# Patient Record
Sex: MALE | Race: WHITE | NOT HISPANIC OR LATINO | Employment: OTHER | URBAN - METROPOLITAN AREA
[De-identification: names, ages, dates, MRNs, and addresses within clinical notes are randomized per-mention and may not be internally consistent; named-entity substitution may affect disease eponyms.]

---

## 2021-05-27 PROBLEM — Z86.0100 PERSONAL HISTORY OF COLONIC POLYPS: Status: ACTIVE | Noted: 2021-05-27

## 2024-05-17 ENCOUNTER — TRANSCRIBE ORDERS (OUTPATIENT)
Dept: ADMINISTRATIVE | Facility: HOSPITAL | Age: 64
End: 2024-05-17
Payer: MEDICARE

## 2024-05-17 DIAGNOSIS — J98.4 DISEASE OF LUNG: Primary | ICD-10-CM

## 2024-05-31 ENCOUNTER — HOSPITAL ENCOUNTER (OUTPATIENT)
Dept: PET IMAGING | Facility: HOSPITAL | Age: 64
Discharge: HOME | End: 2024-05-31
Payer: MEDICARE

## 2024-05-31 DIAGNOSIS — J98.4 DISEASE OF LUNG: ICD-10-CM

## 2024-05-31 LAB — GLUCOSE BLDC GLUCOMTR-MCNC: 111 MG/DL (ref 70–130)

## 2024-05-31 PROCEDURE — 0 FLUDEOXYGLUCOSE F18 SOLUTION: Performed by: GENERAL PRACTICE

## 2024-05-31 PROCEDURE — 78815 PET IMAGE W/CT SKULL-THIGH: CPT

## 2024-05-31 PROCEDURE — A9552 F18 FDG: HCPCS | Performed by: GENERAL PRACTICE

## 2024-05-31 PROCEDURE — 82948 REAGENT STRIP/BLOOD GLUCOSE: CPT

## 2024-05-31 RX ADMIN — FLUDEOXYGLUCOSE F 18 1 DOSE: 200 INJECTION, SOLUTION INTRAVENOUS at 12:49

## 2024-07-11 ENCOUNTER — OFFICE VISIT (OUTPATIENT)
Dept: SURGERY | Facility: CLINIC | Age: 64
End: 2024-07-11
Payer: MEDICARE

## 2024-07-11 VITALS
DIASTOLIC BLOOD PRESSURE: 76 MMHG | WEIGHT: 196.2 LBS | BODY MASS INDEX: 27.75 KG/M2 | SYSTOLIC BLOOD PRESSURE: 120 MMHG | HEART RATE: 50 BPM | OXYGEN SATURATION: 96 %

## 2024-07-11 DIAGNOSIS — K76.9 LIVER LESION: Primary | ICD-10-CM

## 2024-07-11 PROCEDURE — 3074F SYST BP LT 130 MM HG: CPT | Performed by: SURGERY

## 2024-07-11 PROCEDURE — 3078F DIAST BP <80 MM HG: CPT | Performed by: SURGERY

## 2024-07-11 PROCEDURE — 99205 OFFICE O/P NEW HI 60 MIN: CPT | Performed by: SURGERY

## 2024-07-11 NOTE — PROGRESS NOTES
"  THORACIC SURGERY CLINIC CONSULT NOTE    REASON FOR CONSULT: Liver lesion, lung nodule, concerning for metastatic disease, history of melanoma    REFERRING PROVIDER: Joshua Blake MD    Subjective   HISTORY OF PRESENTING ILLNESS:   Renzo Streeter is a 63 y.o. male who has significant medical problems as mentioned in the medical chart.     He had a chest x-ray which noted multiple lung nodules that prompted CT scan of the chest which was done on 5/12/2024 and reported 1.7 cm nodular opacity in the right lower lobe with additional scattered small bilateral pulm nodules.  Subsequent PET CT scan on 5/31/2024 reported multiple hypermetabolic hepatic and osseous lesions and multiple mildly hypermetabolic bilateral solid pulm nodules concerning for metastatic disease.  He has a history of melanoma.  He denied smoking cigarettes.  He is functionally active.    Past Medical History:   Diagnosis Date    Acute renal failure (ARF) 09/2018    RESOLVED AFTER DEHYDRATION RESOLVED    Anemia     Arthritis     OA AND RA    Asthma     Atrial fibrillation with rapid ventricular response     BPH (benign prostatic hyperplasia)     Chronic back pain     Coronary artery calcification seen on CAT scan     Eye cancer, left     TREATMENT WITH RADIATION. SOME VISON LOSS LEFT EYE    Gout     Hepatitis C     IN REMISSION. BEEN LONGER THAN 5 YRS    History of atrial fibrillation     SEPT 2018. WITH SEVERE DEHYDRATION    Hypertension     MRSA infection     HX OF    Pneumonia     Psoriasis     Right hip pain     Short of breath on exertion     Sleep apnea     \"MILD\". DID NOT FOLLOW THUR GETTING MACHINE RELATED TO COST OF MACHINE    Syncope 09/2018    secondary to atrial fibrillation with rapid ventricular response. SEVERELY DEHYDRATED AT THIS TIME.    Vision loss of left eye     EYE CANCER    Vitamin D deficiency        Past Surgical History:   Procedure Laterality Date    BACK SURGERY      LUMBAR DISC X2. NO HARDWARE    CARPAL TUNNEL RELEASE " WITH CUBITAL TUNNEL RELEASE Right     COLONOSCOPY      COLONOSCOPY N/A 7/30/2021    Procedure: COLONOSCOPY;  Surgeon: Flor Maciel MD;  Location: [facility] MAIN OR;  Service: Gastroenterology;  Laterality: N/A;  Diverticulosis    EYE SURGERY Left     Left Eye    REVISION TOTAL KNEE ARTHROPLASTY Right     TOTAL HIP ARTHROPLASTY Right 5/28/2019    Procedure: RIGHT TOTAL HIP ARTHROPLASTY;  Surgeon: Harish Dominguez MD;  Location: [facility] MAIN OR;  Service: Orthopedics    TOTAL KNEE ARTHROPLASTY Right     TOTAL KNEE ARTHROPLASTY Left 4/25/2024    Procedure: TOTAL KNEE ARTHROPLASTY;  Surgeon: Harish Dominguez MD;  [facility];  Service: Orthopedics;  Laterality: Left;       Family History   Problem Relation Age of Onset    Depression Mother     COPD Mother     Mental illness Mother     Diabetes Father     Hypertension Father     Heart disease Father     Hyperlipidemia Father     Cancer Father     Malig Hyperthermia Neg Hx     Drug abuse Neg Hx     Allergies Neg Hx     Asthma Neg Hx     Stroke Neg Hx        Social History     Socioeconomic History    Marital status: Single   Tobacco Use    Smoking status: Never    Smokeless tobacco: Never   Vaping Use    Vaping status: Never Used   Substance and Sexual Activity    Alcohol use: No     Comment: no caffeine     Drug use: No    Sexual activity: Defer         Current Outpatient Medications:     albuterol (PROVENTIL HFA;VENTOLIN HFA) 108 (90 BASE) MCG/ACT inhaler, Inhale 2 puffs every 4 (four) hours as needed for wheezing., Disp: 1 inhaler, Rfl: 3    allopurinol (ZYLOPRIM) 100 MG tablet, Take 1 tablet by mouth 2 (Two) Times a Day., Disp: , Rfl:     amLODIPine (NORVASC) 10 MG tablet, Take 1 tablet by mouth Daily., Disp: , Rfl: 6    aspirin 325 MG EC tablet, Take 1 tablet by mouth 2 (Two) Times a Day With Meals. Indications: VTE Prophylaxis, Disp: 60 tablet, Rfl: 0    cholecalciferol (VITAMIN D3) 25 MCG (1000 UT) tablet, Take 1 tablet by mouth Daily., Disp: , Rfl:      docusate sodium (COLACE) 250 MG capsule, Take 1 capsule by mouth 2 (Two) Times a Day As Needed for Constipation., Disp: 30 capsule, Rfl: 1    Enbrel SureClick 50 MG/ML solution auto-injector, Inject 50 mg under the skin into the appropriate area as directed 1 (One) Time Per Week., Disp: , Rfl:     folic acid (FOLVITE) 1 MG tablet, Take 1 tablet by mouth Daily., Disp: , Rfl:     gabapentin (NEURONTIN) 100 MG capsule, Take 1 capsule by mouth 3 (Three) Times a Day., Disp: , Rfl:     hydrochlorothiazide (HYDRODIURIL) 12.5 MG tablet, Take 1 tablet by mouth Daily., Disp: , Rfl: 1    lisinopril (PRINIVIL,ZESTRIL) 40 MG tablet, Take 1 tablet by mouth Daily., Disp: , Rfl: 6    methotrexate 2.5 MG tablet, Take 1 tablet by mouth 1 (One) Time Per Week. Pt states he takes 10 tablets weekly, Disp: , Rfl:     Omega-3 Fatty Acids (fish oil) 1000 MG capsule capsule, Take 1 capsule by mouth Daily With Breakfast. HOLD PER MD INSTR, Disp: , Rfl:     oxyCODONE-acetaminophen (PERCOCET) 5-325 MG per tablet, Take 1-2 tablets by mouth Every 4 (Four) Hours As Needed (pain)., Disp: 42 tablet, Rfl: 0    sildenafil (VIAGRA) 100 MG tablet, Take 1 tablet by mouth Daily As Needed., Disp: , Rfl:     SYMBICORT 160-4.5 MCG/ACT inhaler, Inhale 2 puffs 2 (Two) Times a Day., Disp: , Rfl: 12    tamsulosin (FLOMAX) 0.4 MG capsule 24 hr capsule, TAKE 1 CAPSULE EVERY DAY (Patient taking differently: 1 capsule 2 (Two) Times a Day.), Disp: 90 capsule, Rfl: 0     No Known Allergies          Objective    OBJECTIVE:     VITAL SIGNS:  /76 (BP Location: Left arm, Patient Position: Sitting, Cuff Size: Adult)   Pulse 50   Wt 89 kg (196 lb 3.2 oz)   SpO2 96%   BMI 27.75 kg/m²     PHYSICAL EXAM:  Normal appearance.   Head is normocephalic.   Nose appears normal.   No obvious deformity of the mouth and throat.  Conjunctivae normal.   Heart rate and rhythm is normal.  Pulmonary effort is normal.   Moving all 4 extremities.  Extremities warm.  No focal  deficit present.   Alert and oriented to person, place, and time.     LAB RESULTS:  I have reviewed all the available laboratory results in the chart.    RESULTS REVIEW:  I have reviewed the patient's all relevant laboratory and imaging findings.          ASSESSMENT & PLAN:  Renzo Streeter is a 63 y.o. male with significant medical conditions as mentioned above presented to my clinic.    Diagnosis: Liver lesion, lung nodule, concerning for metastatic disease, history of melanoma  Staging: Undetermined    The clinical picture is concerning for widely metastatic disease.  I doubt the primary is in the lung.  The intensely PET avid lesion is in the liver and is accessible for CT-guided biopsy.  I will refer him to radiology for CT-guided biopsy.  He will need to see medical oncology for systemic treatment.    I discussed the patients findings and my recommendations with the patient. The patient was given adequate time to ask questions and all questions were answered to patient satisfaction. Thank you for this consult and allowing us to participate in the care of your patient.      Patel Riggs MD  Thoracic Surgeon  [facility]        Dictated utilizing Dragon dictation    I spent 60 minutes caring for Renzo on this date of service. This time includes time spent by me in the following activities:preparing for the visit, reviewing tests, obtaining and/or reviewing a separately obtained history, performing a medically appropriate examination and/or evaluation , counseling and educating the patient/family/caregiver, ordering medications, tests, or procedures, referring and communicating with other health care professionals , documenting information in the medical record, independently interpreting results and communicating that information with the patient/family/caregiver, and care coordination and more than half the time was spent in direct face to face evaluation and decision making.

## 2024-07-11 NOTE — LETTER
July 22, 2024     Joshua Blake MD  [address]  [place] [address]    Patient: Renzo Streeter   YOB: 1960   Date of Visit: 7/11/2024       Dear Joshua Blake MD,    Renzo Streeter was in my office today. Below are the relevant portions of my assessment and plan of care.           If you have questions, please do not hesitate to call me. I look forward to following Renzo along with you.         Sincerely,        Patel Riggs MD        CC: No Recipients

## 2024-07-15 ENCOUNTER — TELEPHONE (OUTPATIENT)
Dept: SURGERY | Facility: CLINIC | Age: 64
End: 2024-07-15
Payer: MEDICARE

## 2024-07-15 ENCOUNTER — PREP FOR SURGERY (OUTPATIENT)
Dept: OTHER | Facility: HOSPITAL | Age: 64
End: 2024-07-15
Payer: MEDICARE

## 2024-07-15 DIAGNOSIS — K76.9 LIVER LESION: Primary | ICD-10-CM

## 2024-07-16 ENCOUNTER — PATIENT OUTREACH (OUTPATIENT)
Dept: OTHER | Facility: HOSPITAL | Age: 64
End: 2024-07-16
Payer: MEDICARE

## 2024-07-16 DIAGNOSIS — R91.1 LUNG NODULE: Primary | ICD-10-CM

## 2024-07-16 DIAGNOSIS — K76.9 HEPATIC LESION: ICD-10-CM

## 2024-07-16 NOTE — PROGRESS NOTES
07/19/24 0001   Pre-Procedure Phone Call   Procedure Time Verified Yes   Arrival Time 0930   Procedure Location Verified Yes   Medical History Reviewed No   NPO Status Reinforced Yes   Ride and Caregiver Arranged Yes   Patient Knows to Bring Current Medications No   Bring Outside Films Requested No

## 2024-07-17 ENCOUNTER — PATIENT OUTREACH (OUTPATIENT)
Dept: OTHER | Facility: HOSPITAL | Age: 64
End: 2024-07-17
Payer: MEDICARE

## 2024-07-17 NOTE — PROGRESS NOTES
Referral received from Dr. Riggs.  I called patient, introduced myself and explained navigational services.      The patient underwent CT of his chest on 5/12/24 which revealed a new 1.7 cm nodular opacity in the right lower lobe with additional scattered smaller bilateral pulmonary nodules. He underwent PET scan on 5/31/24 which revealed multiple hypermetabolic hepatic and osseous lesions and multiple mildly hypermetabolic bilateral solid pulmonary nodules concerning for metastatic disease. The patient was referred by his PCP to thoracic surgery on 7/3. He met with Dr. Riggs 7/11/24 to discuss his PET findings and next steps. Dr. Riggs ordered a CT guided liver biopsy, scheduled 7/19, to determine his primary cancer site and referred to medical oncology.    We discussed his recent appts with Dr. Riggs and upcoming liver biopsy. The patient is aware that he has a malignancy and the liver biopsy should reveal the primary disease site.  Explained I am working on coordinating a medical oncology appt.   The patient verbalized understanding.     The patient is alert and oriented. He has psoriatic arthropathy (on Enbrel) and a PMH of left TKA 4/25/24 and RHA 5/28/19.  He reports persistent back issues and met with neurosurgery yesterday for a pinched nerve in his back. The patient reports ongoing pain, managed with oral medications, and some weakness, although he ambulates without assistive devices, denies falls and is independent with ADLs. He lives alone in a single level home with a basement in [place], [place]. He denies difficulty navigating stairs.  The patient states he was disabled due to his back issues although is now retired.    The patient is a never smoker.    The patient has [employer] Medicare Replacement. He denies any current BHE claim issues. We discussed financial navigation, cost estimates and possible financial assistance if needed in the future.     The patient drives; he denies transportation, financial or other  resource needs at this time.     The patient states he is currently eating well. He reports his weight was 220 lbs 1 year ago, which decreased to 198 lbs (unintentionally). He currently weights 207 lbs.   We discussed a referral to oncology nutrition if needed in the future.     The patient has an ACP, although states he is in the process of updating it. Requested copy for the chart once finalized.    The patient has a history of uveal melanoma, which he states was treated with radiation 10-11 years ago in [place] (patient couldn't remember the name of provider). He continues annual surveillance for this (with local eye doctor-he can't remember the name). The patient reports approximately 40% vision loss in his left eye following treatment.  The patient states his mother had COPD although did not have lung cancer. He reports his father had cancer, although he doesn't know the exact type (he thinks it was bone).     The patient is processing through his probable cancer diagnosis.  Provided active listening and emotional support, validating and normalizing patient's feelings. He reports an adequate support system. The patient does not take any medications for anxiety or depression.  We discussed OSW and Behavioral oncology services.  Patient expressed gratitude for my support and denied any additional needs at this time. We also discussed [employer] and [facility] once his cancer type is confirmed.     We discussed integrative therapies and other services at the [facility]. Mailed a navigation folder with the following information today: [facility], Cancer and Restorative Exercise - CARE, [employer] exercise program, [facility], [facility], Reiki Therapy, [employer] [facility], Cancer Nutrition, Smoking Cessation and Survivorship Clinic.  I will provide additional disease specific cancer information once his primary cancer site is confirmed.    I  will continue to follow; provided my name and number and encouraged patient to call if any needs arise.      Call from friend, Jacque Carolina. Requested cb. She is on his MyChart. Called patient and rec'd verbal authorization to speak with Jacque Figueroa. We discussed his biopsy and plan to schedule with med onc to discuss treatment plan once primary cancer site is identified.  She will try to get the provider name/number who treated his uveal melanoma in the past.  She verbalized appreciation for talking with her.

## 2024-07-19 ENCOUNTER — HOSPITAL ENCOUNTER (OUTPATIENT)
Dept: CT IMAGING | Facility: HOSPITAL | Age: 64
Discharge: HOME | End: 2024-07-19
Payer: MEDICARE

## 2024-07-19 DIAGNOSIS — K76.9 LIVER LESION: ICD-10-CM

## 2024-07-19 LAB — PLATELET # BLD AUTO: 298 10*3/MM3 (ref 140–450)

## 2024-07-19 PROCEDURE — 85049 AUTOMATED PLATELET COUNT: CPT | Performed by: RADIOLOGY

## 2024-07-19 PROCEDURE — 88341 IMHCHEM/IMCYTCHM EA ADD ANTB: CPT | Performed by: SURGERY

## 2024-07-19 PROCEDURE — 88342 IMHCHEM/IMCYTCHM 1ST ANTB: CPT | Performed by: SURGERY

## 2024-07-19 PROCEDURE — 25010000002 LIDOCAINE 1 % SOLUTION: Performed by: RADIOLOGY

## 2024-07-19 PROCEDURE — 88307 TISSUE EXAM BY PATHOLOGIST: CPT | Performed by: SURGERY

## 2024-07-19 PROCEDURE — 77012 CT SCAN FOR NEEDLE BIOPSY: CPT

## 2024-07-19 RX ORDER — FLUMAZENIL 0.1 MG/ML
INJECTION INTRAVENOUS
Status: DISCONTINUED
Start: 2024-07-19 | End: 2024-07-19 | Stop reason: WASHOUT

## 2024-07-19 RX ORDER — MIDAZOLAM HYDROCHLORIDE 1 MG/ML
INJECTION INTRAMUSCULAR; INTRAVENOUS
Status: ACTIVE
Start: 2024-07-19 | End: 2024-07-19

## 2024-07-19 RX ORDER — SODIUM CHLORIDE 0.9 % (FLUSH) 0.9 %
10 SYRINGE (ML) INJECTION AS NEEDED
Status: DISCONTINUED | OUTPATIENT
Start: 2024-07-19 | End: 2024-07-20 | Stop reason: HOSPADM

## 2024-07-19 RX ORDER — NALOXONE HYDROCHLORIDE 0.4 MG/ML
INJECTION, SOLUTION INTRAMUSCULAR; INTRAVENOUS; SUBCUTANEOUS
Status: DISCONTINUED
Start: 2024-07-19 | End: 2024-07-19 | Stop reason: WASHOUT

## 2024-07-19 RX ORDER — FENTANYL CITRATE 50 UG/ML
INJECTION, SOLUTION INTRAMUSCULAR; INTRAVENOUS
Status: ACTIVE
Start: 2024-07-19 | End: 2024-07-19

## 2024-07-19 RX ORDER — MIDAZOLAM HYDROCHLORIDE 1 MG/ML
0.5 INJECTION INTRAMUSCULAR; INTRAVENOUS ONCE AS NEEDED
Status: DISCONTINUED | OUTPATIENT
Start: 2024-07-19 | End: 2024-07-20 | Stop reason: HOSPADM

## 2024-07-19 RX ORDER — LIDOCAINE HYDROCHLORIDE 10 MG/ML
20 INJECTION, SOLUTION INFILTRATION; PERINEURAL ONCE
Status: COMPLETED | OUTPATIENT
Start: 2024-07-19 | End: 2024-07-19

## 2024-07-19 RX ORDER — SODIUM CHLORIDE 9 MG/ML
25 INJECTION, SOLUTION INTRAVENOUS ONCE
Status: DISCONTINUED | OUTPATIENT
Start: 2024-07-19 | End: 2024-07-20 | Stop reason: HOSPADM

## 2024-07-19 RX ADMIN — LIDOCAINE HYDROCHLORIDE 20 ML: 10 INJECTION, SOLUTION INFILTRATION; PERINEURAL at 11:44

## 2024-07-25 ENCOUNTER — LAB (OUTPATIENT)
Dept: LAB | Facility: HOSPITAL | Age: 64
End: 2024-07-25
Payer: MEDICARE

## 2024-07-25 ENCOUNTER — CONSULT (OUTPATIENT)
Dept: ONCOLOGY | Facility: CLINIC | Age: 64
End: 2024-07-25
Payer: MEDICARE

## 2024-07-25 VITALS
BODY MASS INDEX: 29.63 KG/M2 | RESPIRATION RATE: 16 BRPM | SYSTOLIC BLOOD PRESSURE: 117 MMHG | HEIGHT: 70 IN | OXYGEN SATURATION: 95 % | WEIGHT: 207 LBS | DIASTOLIC BLOOD PRESSURE: 77 MMHG | TEMPERATURE: 98.1 F | HEART RATE: 57 BPM

## 2024-07-25 DIAGNOSIS — C43.9 METASTATIC MELANOMA: Primary | ICD-10-CM

## 2024-07-25 DIAGNOSIS — C69.92 MALIGNANT NEOPLASM OF LEFT EYE: Primary | ICD-10-CM

## 2024-07-25 DIAGNOSIS — C78.7 CANCER, METASTATIC TO LIVER: ICD-10-CM

## 2024-07-25 DIAGNOSIS — C34.90 MALIGNANT NEOPLASM OF LUNG, UNSPECIFIED LATERALITY, UNSPECIFIED PART OF LUNG: Primary | ICD-10-CM

## 2024-07-25 DIAGNOSIS — C43.9 METASTATIC MALIGNANT MELANOMA: Primary | ICD-10-CM

## 2024-07-25 DIAGNOSIS — C79.51 SECONDARY CANCER OF BONE: ICD-10-CM

## 2024-07-25 DIAGNOSIS — C78.02 MALIGNANT NEOPLASM METASTATIC TO BOTH LUNGS: ICD-10-CM

## 2024-07-25 DIAGNOSIS — C69.32 MALIGNANT NEOPLASM OF LEFT CHOROID: ICD-10-CM

## 2024-07-25 DIAGNOSIS — C69.92 MALIGNANT NEOPLASM OF LEFT EYE: ICD-10-CM

## 2024-07-25 DIAGNOSIS — C78.01 MALIGNANT NEOPLASM METASTATIC TO BOTH LUNGS: ICD-10-CM

## 2024-07-25 LAB
BASOPHILS # BLD AUTO: 0.1 10*3/MM3 (ref 0–0.2)
BASOPHILS NFR BLD AUTO: 1.1 % (ref 0–1.5)
DEPRECATED RDW RBC AUTO: 53.4 FL (ref 37–54)
EOSINOPHIL # BLD AUTO: 0.54 10*3/MM3 (ref 0–0.4)
EOSINOPHIL NFR BLD AUTO: 5.8 % (ref 0.3–6.2)
ERYTHROCYTE [DISTWIDTH] IN BLOOD BY AUTOMATED COUNT: 15.8 % (ref 12.3–15.4)
HCT VFR BLD AUTO: 42.8 % (ref 37.5–51)
HGB BLD-MCNC: 13.7 G/DL (ref 13–17.7)
IMM GRANULOCYTES # BLD AUTO: 0.06 10*3/MM3 (ref 0–0.05)
IMM GRANULOCYTES NFR BLD AUTO: 0.6 % (ref 0–0.5)
LYMPHOCYTES # BLD AUTO: 3.17 10*3/MM3 (ref 0.7–3.1)
LYMPHOCYTES NFR BLD AUTO: 33.9 % (ref 19.6–45.3)
MCH RBC QN AUTO: 29.6 PG (ref 26.6–33)
MCHC RBC AUTO-ENTMCNC: 32 G/DL (ref 31.5–35.7)
MCV RBC AUTO: 92.4 FL (ref 79–97)
MONOCYTES # BLD AUTO: 1 10*3/MM3 (ref 0.1–0.9)
MONOCYTES NFR BLD AUTO: 10.7 % (ref 5–12)
NEUTROPHILS NFR BLD AUTO: 4.49 10*3/MM3 (ref 1.7–7)
NEUTROPHILS NFR BLD AUTO: 47.9 % (ref 42.7–76)
NRBC BLD AUTO-RTO: 0 /100 WBC (ref 0–0.2)
PLATELET # BLD AUTO: 282 10*3/MM3 (ref 140–450)
PMV BLD AUTO: 9.4 FL (ref 6–12)
RBC # BLD AUTO: 4.63 10*6/MM3 (ref 4.14–5.8)
WBC NRBC COR # BLD AUTO: 9.36 10*3/MM3 (ref 3.4–10.8)

## 2024-07-25 PROCEDURE — 85025 COMPLETE CBC W/AUTO DIFF WBC: CPT

## 2024-07-25 PROCEDURE — 3078F DIAST BP <80 MM HG: CPT | Performed by: INTERNAL MEDICINE

## 2024-07-25 PROCEDURE — 3074F SYST BP LT 130 MM HG: CPT | Performed by: INTERNAL MEDICINE

## 2024-07-25 PROCEDURE — 1125F AMNT PAIN NOTED PAIN PRSNT: CPT | Performed by: INTERNAL MEDICINE

## 2024-07-25 PROCEDURE — 36415 COLL VENOUS BLD VENIPUNCTURE: CPT

## 2024-07-25 PROCEDURE — 99205 OFFICE O/P NEW HI 60 MIN: CPT | Performed by: INTERNAL MEDICINE

## 2024-07-25 NOTE — PROGRESS NOTES
.     REASON FOR CONSULTATION:     Provide an opinion on any further workup or treatment of metastatic melanoma.                             REQUESTING PHYSICIAN: Patel Riggs MD    RECORDS OBTAINED:  Records of the patient's history including those obtained from the referring provider were reviewed and summarized in detail.    HISTORY OF PRESENT ILLNESS:  The patient is a 63 y.o. year old male  who is here for initial evaluation with the above-mentioned history.  History of Present Illness  The patient presents today on 7/25/2024 for evaluation of newly diagnosed metastatic melanoma.  This patient has end-stage arthritis required bilateral knee replacement, history of left eye melanoma, lumbar stenosis status post discectomy, hepatitis C, hypertension, and psoriasis.    His primary doctor found lung issues first.  Patient reports he was congested with a cough and dyspnea for about a month.  Patient went to see his primary care physician Dr. Joshua Blake on 4/17/2024 who prescribed doxycycline and had a chest ray examination which reported multiple pulmonary nodules.  He subsequently had a chest CT on 5/152024 which reported a 1.7 cm nodule opacity in the right lower lobe with additional scattered small bilateral pulmonary nodules.  It also reported slightly increased size of mildly asymmetric prominent left axillary lymph nodes.  There is also mild asymmetric elevation of the left hemidiaphragm with a new segmental atelectasis and scarring at the left lung base.    Patient has subsequently had a PET scan examination at the [facility], and the reported the right lower lobe 1.6 cm solid nodule with a maximum SUV 3.6.  Multiple additional bilateral subcentimeter solid nodules too small for accurate PET characterization but mainly had uptake greater than background 11.  There was no enlarged hypermetabolic mediastinal or hilar lymph nodes.  In the abdomen, there were 2 dominant hypermetabolic  hypodense right hepatic lesions with reference 2.7 cm with SUV 11.1.  There is at least 3 additional smaller hypermetabolic pedicle foci with the left hepatic lobe SUV 3.7.  The lesion in the caudate lobe had a maximum SUV 5.5.  There are multiple pulm lesions, the T6 lesion had SUV 10.8, and the right ilium lesion maximum SUV 10.7, and the right sacrum lesion SUV 9.7, and the left supra-acetabular ilium lesion with SUV 4.6.    The patient was seen by thoracic surgeon Dr. Riggs on 2024, Dr. Riggs recommended CT-guided core needle biopsy of the liver lesion.  This was done on 2024.  Pathology evaluation reported metastatic melanoma.     Patient reports today that he was diagnosed with melanoma in his left eye approximately 12 years ago during a routine eye exam. At that time, his eyesight was unimpaired, but a patch was placed in his eye. He spent about a week in a room following the procedure. He lost approximately 40 percent of his vision due to blockage. He received radiation therapy in [facility].     He experienced a minor headache a few weeks ago, which is unusual for him. He denies experiencing nausea, vomiting, or abdominal pain. His appetite is normal. He experienced weight loss of 10 pounds about 1 to 2 months ago, but has since regained it and is attempting to lose weight again.    He is scheduled for an MRI of the spine on 2024. His back pain, which he rates as 6 out of 10 at its worst, is located in the lower back at the sacrum area. He also reports weakness in his legs and is not currently taking any medication for the pain. He has consulted with a neurosurgeon and has informed them of his cancer diagnosis.      SOCIAL HISTORY  He is a  but has never smoked. He used to drink 2 drinks a day, but he has not drunk in over 20 to 25 years.    FAMILY HISTORY  His father had liver, bone, or blood cancer. He  of the cancer.  No details available.         Results  Laboratory Studies  "on 7/25/2024  Hemoglobin 13.7 MCV 92.4, platelets 282,000 WBC 9360 neutrophils 4490 lymphocytes 3170, monocytes 1000 eosinophil 540 basophil 100 and immature granulocytes 60.      Biopsy from the liver confirmed metastatic melanoma.        Past Medical History:   Diagnosis Date    Acute renal failure (ARF) 09/2018    RESOLVED AFTER DEHYDRATION RESOLVED    Anemia     Arthritis     OA AND RA    Asthma     Atrial fibrillation with rapid ventricular response     Bone cancer     BPH (benign prostatic hyperplasia)     Chronic back pain     CKD (chronic kidney disease)     Stage 3    COPD (chronic obstructive pulmonary disease)     Coronary artery calcification seen on CAT scan     Eye cancer, left     TREATMENT WITH RADIATION. SOME VISON LOSS LEFT EYE    Gout     Hepatitis C     IN REMISSION. BEEN LONGER THAN 5 YRS    Hiatal hernia     History of atrial fibrillation     SEPT 2018. WITH SEVERE DEHYDRATION    History of blood transfusion 1978    in [place], no known reaction mild shortness of breath    Hypertension     Liver cancer     Lung cancer     MRSA infection     HX OF    Pneumonia     Psoriasis     Right hip pain     Short of breath on exertion     Sleep apnea     \"MILD\". DID NOT FOLLOW THUR GETTING MACHINE RELATED TO COST OF MACHINE    Syncope 09/2018    secondary to atrial fibrillation with rapid ventricular response. SEVERELY DEHYDRATED AT THIS TIME.    Viral hepatitis     Vision loss of left eye     EYE CANCER    Vitamin D deficiency      Past Surgical History:   Procedure Laterality Date    BACK SURGERY      LUMBAR DISC X2. NO HARDWARE    CARPAL TUNNEL RELEASE WITH CUBITAL TUNNEL RELEASE Right     COLONOSCOPY      COLONOSCOPY N/A 07/30/2021    Procedure: COLONOSCOPY;  Surgeon: Flor Maciel MD;  Location: [facility] MAIN OR;  Service: Gastroenterology;  Laterality: N/A;  Diverticulosis    EYE SURGERY Left     Left Eye    LUMBAR SPINE SURGERY      x3 in 2000. 2010, 2011    REVISION TOTAL KNEE ARTHROPLASTY " Right     TOTAL HIP ARTHROPLASTY Right 05/28/2019    Procedure: RIGHT TOTAL HIP ARTHROPLASTY;  Surgeon: Harish Dominguez MD;  Location: [facility] MAIN OR;  Service: Orthopedics    TOTAL KNEE ARTHROPLASTY Right     TOTAL KNEE ARTHROPLASTY Left 04/25/2024    Procedure: TOTAL KNEE ARTHROPLASTY;  Surgeon: Harish Dominguez MD;  Location: [facility] OR [facility];  Service: Orthopedics;  Laterality: Left;       MEDICATIONS    Current Outpatient Medications:     albuterol (PROVENTIL HFA;VENTOLIN HFA) 108 (90 BASE) MCG/ACT inhaler, Inhale 2 puffs every 4 (four) hours as needed for wheezing., Disp: 1 inhaler, Rfl: 3    allopurinol (ZYLOPRIM) 100 MG tablet, Take 1 tablet by mouth 2 (Two) Times a Day., Disp: , Rfl:     amLODIPine (NORVASC) 10 MG tablet, Take 1 tablet by mouth Daily., Disp: , Rfl: 6    cholecalciferol (VITAMIN D3) 25 MCG (1000 UT) tablet, Take 1 tablet by mouth Daily., Disp: , Rfl:     docusate sodium (COLACE) 250 MG capsule, Take 1 capsule by mouth 2 (Two) Times a Day As Needed for Constipation., Disp: 30 capsule, Rfl: 1    Enbrel SureClick 50 MG/ML solution auto-injector, Inject 50 mg under the skin into the appropriate area as directed 1 (One) Time Per Week., Disp: , Rfl:     folic acid (FOLVITE) 1 MG tablet, Take 1 tablet by mouth Daily., Disp: , Rfl:     gabapentin (NEURONTIN) 100 MG capsule, Take 1 capsule by mouth 3 (Three) Times a Day., Disp: , Rfl:     hydrochlorothiazide (HYDRODIURIL) 12.5 MG tablet, Take 1 tablet by mouth Daily., Disp: , Rfl: 1    lisinopril (PRINIVIL,ZESTRIL) 40 MG tablet, Take 1 tablet by mouth Daily., Disp: , Rfl: 6    Omega-3 Fatty Acids (fish oil) 1000 MG capsule capsule, Take 1 capsule by mouth Daily With Breakfast. HOLD PER MD INSTR, Disp: , Rfl:     oxyCODONE-acetaminophen (PERCOCET) 5-325 MG per tablet, Take 1-2 tablets by mouth Every 4 (Four) Hours As Needed (pain)., Disp: 42 tablet, Rfl: 0    sildenafil (VIAGRA) 100 MG tablet, Take 1 tablet by mouth Daily As Needed., Disp: , Rfl:      SYMBICORT 160-4.5 MCG/ACT inhaler, Inhale 2 puffs 2 (Two) Times a Day., Disp: , Rfl: 12    tamsulosin (FLOMAX) 0.4 MG capsule 24 hr capsule, TAKE 1 CAPSULE EVERY DAY (Patient taking differently: 1 capsule 2 (Two) Times a Day.), Disp: 90 capsule, Rfl: 0    aspirin 325 MG EC tablet, Take 1 tablet by mouth 2 (Two) Times a Day With Meals. Indications: VTE Prophylaxis (Patient not taking: Reported on 7/25/2024), Disp: 60 tablet, Rfl: 0    methotrexate 2.5 MG tablet, Take 1 tablet by mouth 1 (One) Time Per Week. Pt states he takes 10 tablets weekly (Patient not taking: Reported on 7/25/2024), Disp: , Rfl:     ALLERGIES:   No Known Allergies    SOCIAL HISTORY:       Social History     Socioeconomic History    Marital status: Single   Tobacco Use    Smoking status: Never     Passive exposure: Never    Smokeless tobacco: Never   Vaping Use    Vaping status: Never Used   Substance and Sexual Activity    Alcohol use: No     Comment: no caffeine     Drug use: No    Sexual activity: Defer         FAMILY HISTORY:  Family History   Problem Relation Age of Onset    Depression Mother     COPD Mother     Mental illness Mother     Diabetes Father     Hypertension Father     Heart disease Father     Hyperlipidemia Father     Cancer Father     Malig Hyperthermia Neg Hx     Drug abuse Neg Hx     Allergies Neg Hx     Asthma Neg Hx     Stroke Neg Hx        REVIEW OF SYSTEMS:  Review of Systems   Constitutional:  Positive for fatigue and unexpected weight change. Negative for appetite change, diaphoresis and fever.   HENT:  Negative for mouth sores, sinus pressure and trouble swallowing.    Eyes:  Positive for visual disturbance.   Respiratory:  Negative for cough and shortness of breath.    Cardiovascular:  Negative for chest pain and leg swelling.   Gastrointestinal:  Negative for abdominal distention, abdominal pain, anal bleeding, blood in stool, nausea and vomiting.   Genitourinary:  Negative for dysuria and hematuria.    Musculoskeletal:  Positive for back pain. Negative for joint swelling and neck pain.   Skin:  Negative for pallor.   Allergic/Immunologic: Positive for immunocompromised state.   Neurological:  Negative for seizures and headaches.   Hematological:  Negative for adenopathy.   Psychiatric/Behavioral:  Negative for confusion.               There were no vitals filed for this visit.      7/25/2024     8:55 AM   Current Status   ECOG score 0      PHYSICAL EXAM:      Physical Exam    CONSTITUTIONAL:  Vital signs reviewed.  Well-developed well-nourished male.  No distress, looks comfortable.  EYES:  Conjunctivae and lids unremarkable.    EARS,NOSE,MOUTH,THROAT:  Ears and nose appear unremarkable.  Lips appear unremarkable.  RESPIRATORY:  Normal respiratory effort.  Lungs clear to auscultation bilaterally.  CARDIOVASCULAR:  Normal S1, S2.  No significant lower extremity edema.  GASTROINTESTINAL: Abdomen appears unremarkable.  Nontender.  No hepatomegaly.  No splenomegaly.  LYMPHATIC:  No cervical, supraclavicular, axillary lymphadenopathy.  MUSCULOSKELETAL:  Unremarkable gait and station.  Unremarkable digits/nails.  No cyanosis or clubbing.  SKIN:  Warm.  No rashes.      RECENT LABS:        WBC   Date Value Ref Range Status   04/26/2024 21.45 (H) 3.40 - 10.80 10*3/mm3 Final   03/26/2024 9.41 3.40 - 10.80 10*3/mm3 Final   10/19/2021 8.82 4.5 - 11.0 10*3/uL Final   01/06/2021 9.81 3.40 - 10.80 10*3/mm3 Final   05/29/2019 18.94 (H) 3.40 - 10.80 10*3/mm3 Final   05/22/2019 9.67 3.40 - 10.80 10*3/mm3 Final   03/18/2019 11.1 4.5 - 11.5 10*3/uL Final   12/19/2018 9.0 4.5 - 11.0 10*3/uL Final   09/01/2018 10.51 4.50 - 10.70 10*3/mm3 Final   08/31/2018 9.91 4.50 - 10.70 10*3/mm3 Final   08/30/2018 10.48 4.50 - 10.70 10*3/mm3 Final   08/30/2018 10.22 4.50 - 10.70 10*3/mm3 Final   08/16/2018 TNP Thousand/uL K/uL Final   05/02/2018 7.8 THOUSAND/UL 3.8 - 10.8 K/uL Final   01/31/2018 9.1 THOUSAND/UL 3.8 - 10.8 K/uL Final   07/13/2017  9.4 THOUSAND/UL 3.8 - 10.8 K/uL Final   04/09/2017 11.8 THOUSAND/UL (H) 3.8 - 10.8 K/uL Final   01/05/2017 9.8 THOUSAND/UL 3.8 - 10.8 K/uL Final   02/16/2015 10.96 (H) 4.50 - 10.70 K/Cumm Final   08/30/2014 11.69 (H) 4.50 - 10.70 K/Cumm Final   08/29/2014 15.59 (H) 4.50 - 10.70 K/Cumm Final     Hemoglobin   Date Value Ref Range Status   04/26/2024 10.9 (L) 13.0 - 17.7 g/dL Final   03/26/2024 13.7 13.0 - 17.7 g/dL Final   10/19/2021 13.8 13.5 - 17.5 g/dL Final   01/06/2021 15.4 13.0 - 17.7 g/dL Final   05/29/2019 12.7 (L) 13.0 - 17.7 g/dL Final   05/22/2019 15.0 13.0 - 17.7 g/dL Final   03/18/2019 14.4 14.0 - 18.0 g/dL Final   12/19/2018 15.7 13.5 - 17.5 g/dL Final   09/01/2018 13.0 (L) 13.7 - 17.6 g/dL Final   08/31/2018 13.0 (L) 13.7 - 17.6 g/dL Final   08/30/2018 13.3 (L) 13.7 - 17.6 g/dL Final   08/30/2018 12.8 (L) 13.7 - 17.6 g/dL Final   05/02/2018 15.0 13.2 - 17.1 g/dL Final   01/31/2018 16.7 13.2 - 17.1 g/dL Final   07/13/2017 17.4 (H) 13.2 - 17.1 g/dL Final   04/09/2017 15.3 13.2 - 17.1 g/dL Final   01/05/2017 15.7 13.2 - 17.1 g/dL Final   02/28/2015 13.3 (L) 13.7 - 17.6 g/dL Final   02/16/2015 15.3 13.7 - 17.6 g/dL Final   08/30/2014 14.2 13.7 - 17.6 g/dL Final   08/29/2014 16.1 13.7 - 17.6 g/dL Final     Platelets   Date Value Ref Range Status   07/19/2024 298 140 - 450 10*3/mm3 Final   04/26/2024 196 140 - 450 10*3/mm3 Final   03/26/2024 287 140 - 450 10*3/mm3 Final   10/19/2021 281 140 - 440 10*3/uL Final   01/06/2021 290 140 - 450 10*3/mm3 Final   05/29/2019 238 140 - 450 10*3/mm3 Final   05/22/2019 298 140 - 450 10*3/mm3 Final   03/18/2019 282 150 - 450 10*3/uL Final   12/19/2018 219 140 - 440 10*3/uL Final   09/01/2018 186 140 - 500 10*3/mm3 Final   08/31/2018 179 140 - 500 10*3/mm3 Final   08/30/2018 201 140 - 500 10*3/mm3 Final   08/30/2018 193 140 - 500 10*3/mm3 Final   05/02/2018 235 THOUSAND/ - 400 10*3/mm3 Final   01/31/2018 253 THOUSAND/ - 400 10*3/mm3 Final   07/13/2017 243  "THOUSAND/ - 400 10*3/mm3 Final   04/09/2017 237 THOUSAND/ - 400 10*3/mm3 Final   01/05/2017 231 THOUSAND/ - 400 10*3/mm3 Final   02/16/2015 254 140 - 500 K/Cumm Final   08/30/2014 192 140 - 500 K/Cumm Final   08/29/2014 229 140 - 500 K/Cumm Final       PATHOLOGY:  Tissue Pathology Exam: [id number]  Order: [id number]  Status: Final result       Visible to patient: No (scheduled for 7/25/2024  9:53 AM)       Next appt: None    Specimen Information: Liver; Tissue   0 Result Notes      Component    Case Report   Surgical Pathology Report                         Case: [id number]                                   Authorizing Provider:  Patel Riggs MD            Collected:           07/19/2024 11:50 AM           Ordering Location:     [facility]  Received:            07/19/2024 04:54 PM                                  CT                                                                           Pathologist:           Antonieta Hickey MD                                                     Specimen:    Liver, liver                                                                              Final Diagnosis   1.  Liver, CT-guided core biopsies for lesion: METASTATIC MELANOMA.     SWM   Electronically signed by Antonieta Hickey MD on 7/22/2024 at 0953   Gross Description    1. Liver.   Received in formalin, labeled with the patient's name, and designated \" liver biopsy\", are 3 orange to gray-white core fragments of soft tissue measuring from 0.4 cm up to 1.7 x 0.1 cm in tubal diameter.  The tissue is submitted in 1A.        mb/uso/swm         Special Stains    Utilizing appropriate controls, multiple immunohistochemical stains are performed including S100, SOX10, AE1/AE3, Mart 1, CK5/6, TTF-1, p40 and CK7.  The tumor cells are positive for S100, SOX10 and Mart 1 and negative for the remaining markers, supporting the rendered diagnosis.              IMAGING:  F-18 FDG PET SKULL BASE " TO MID THIGH WITH PET CT FUSION.     HISTORY: Ocular melanoma of the right eye. Pulmonary nodules. Subsequent  treatment strategy.     TECHNIQUE: Radiation dose reduction techniques were utilized, including  automated exposure control and exposure modulation based on body size.   Blood glucose level at time of injection was 111 mg/dL. 5.9 mCi of F-18  FDG were injected and PET was performed from skull base to mid thigh. CT  was obtained for localization and attenuation correction. Normalization  method: patient weight. Time at injection 1249. PET start time 225.     Comparison: CT chest 5/12/2024. FDG PET/CT of 5/1/2015. CT abdomen  pelvis 4/17/2015..     FINDINGS:     Head/neck: No suspicious uptake.     Chest: Right lower lobe 1.6 cm solid nodule with max SUV of 3.6 (series  4/image 162). Multiple additional bilateral subcentimeter solid nodules  are too small for accurate PET characterization but many have uptake  greater than background lung. Reference 0.7 cm left lower lobe nodule  with misregistered max SUV of 1.3 (series 4/image 171).     No enlarged or hypermetabolic mediastinal/hilar lymph nodes        Abdomen/pelvis: Two dominant hypermetabolic slightly hypodense right  hepatic lesions. Reference 2.7 cm right hepatic lesion with max SUV of  11.1 (series 4/image 229). At least 3 additional small hypermetabolic  hepatic foci. Reference focus in the left hepatic lobe with max SUV of  3.7 (series 4/image 221). Additional reference focus in the caudate lobe  with max SUV of 5.5 (series 4/image 243).     Likely urinary activity within the distal left ureter.     Bones: Few hypermetabolic faintly sclerotic foci.     Reference osseous lesions:  *  T6 with max SUV of 10.8 (series 4/image 160)  *  Right ilium with max SUV of 10.7 (series 4/image 322)  *  Right sacrum with max SUV of 9.7 (series 4/image 347)  *  Left supra-acetabular ilium with max SUV of 4.6 (series 4/image 362)           IMPRESSION:  Multiple  hypermetabolic hepatic and osseous lesions and  multiple mildly hypermetabolic bilateral solid pulmonary nodules are  concerning for metastatic disease. Recommend tissue sampling.       Assessment & Plan   Assessment & Plan  1. Metastatic melanoma in the liver, bilateral lungs and bones.  Patient has a history of left eye melanoma 12 years ago.  Patient had a cough and x-ray subsequently chest CT scan 7/15/2024 showed left lower lobe pulmonary nodule 1.7 cm, and multiple other smaller pulmonary nodules.  PET scan on 7/31/2024 reported multiple hypermetabolic lesions. The patient also has multiple metastatic hypermetabolic bone lesions including the T6 vertebral body and also the bilateral iliac wings and also the left sacrum close to the SI joint.   Patient had a liver biopsy confirmed to be metastatic melanoma.  I discussed with the patient the on 7/25/2024 that I recommended to test his tumor sample by [facility] NGS study to see if the patient will have BRAF mutation so he would be a candidate for oral TKI treatment. Then, we also would look into whether this patient would be a candidate for other targeted therapy as well as immunotherapy.   Arrangements will be made for brain MRI with and without contrast.      2.  Metastatic cancer to the bone.   PET scan examination showed multiple bone lesions.    I recommended to start the patient on IV Zometa every 4 weeks.     3.  Back pain.  PET scan examination showed multiple bone lesions including T6 vertebral body, and the sacrum and the pelvic bone..    The patient reports low back pain and I looked at the PET scan images. I do not think necessarily the small left SI joint area bone lesion causing the problem. He reports that he was seen by neurosurgeon and is scheduled to have a spine MRI examination on 08/09/2024.  I certainly would like to see the results of that to make final assessment, to see whether this patient would benefit from some radiation therapy or he has  lower back pain from other course. He previously had multiple lumbar spine surgeries for pinched nerve/discectomy.        PLAN:  The liver biopsy sample to be tested by [facility] NGS study to see if the patient will have BRAF mutation so he would be a candidate for oral TKI treatment. Then, we also would look into whether this patient would be a candidate for other targeted therapy as well as immunotherapy.   This test takes 10 working days, so there will be at least 2 weeks from current time.   Arrangements will be made for brain MRI with and without contrast for further evaluation.  Patient reports having intermittent headaches.  Arrangements will be made for intravenous Zometa treatment. I will obtain insurance permission.   I will see the patient in about 3 weeks, expecting the molecular pathology study results will be back, so we could discuss further about management of plan. The patient reports voiced understanding and agreeable.     I independently reviewed his PET scan images and shared images with the patient today.    I spent 70 minutes caring for Renzo on this date of service. This time includes time spent by me in the following activities: preparing for the visit, reviewing tests, obtaining and/or reviewing a separately obtained history, performing a medically appropriate examination and/or evaluation, counseling and educating the patient/family/caregiver, ordering medications, tests, or procedures, referring and communicating with other health care professionals, documenting information in the medical record, independently interpreting results and communicating that information with the patient/family/caregiver and care coordination     DARYL BERMUDEZ M.D., Ph.D.        CC:    MD Hayden Ruano Stephen C., MD Sarah Seifert, PA-C

## 2024-07-26 ENCOUNTER — TELEPHONE (OUTPATIENT)
Dept: ONCOLOGY | Facility: CLINIC | Age: 64
End: 2024-07-26
Payer: MEDICARE

## 2024-07-26 ENCOUNTER — PATIENT ROUNDING (BHMG ONLY) (OUTPATIENT)
Dept: ONCOLOGY | Facility: CLINIC | Age: 64
End: 2024-07-26
Payer: MEDICARE

## 2024-07-26 DIAGNOSIS — R45.89 ANXIETY ABOUT HEALTH: Primary | ICD-10-CM

## 2024-07-26 LAB
LAB AP CASE REPORT: NORMAL
LAB AP SPECIAL STAINS: NORMAL
PATH REPORT.ADDENDUM SPEC: NORMAL
PATH REPORT.FINAL DX SPEC: NORMAL
PATH REPORT.GROSS SPEC: NORMAL

## 2024-07-26 RX ORDER — DIAZEPAM 5 MG/1
5 TABLET ORAL ONCE
Qty: 1 TABLET | Refills: 0 | Status: SHIPPED | OUTPATIENT
Start: 2024-07-26 | End: 2024-07-26

## 2024-07-26 NOTE — TELEPHONE ENCOUNTER
Patient called and request a Valium to take before his MRI on 8/5/24. Explained to patient will speak to Dr Ellis when he returns to the office on Tuesday and call patient back.    Patient v/u

## 2024-07-26 NOTE — PROGRESS NOTES
A My-Chart message has been sent to the patient for PATIENT ROUNDING with [facility].

## 2024-07-29 ENCOUNTER — PATIENT OUTREACH (OUTPATIENT)
Dept: OTHER | Facility: HOSPITAL | Age: 64
End: 2024-07-29
Payer: MEDICARE

## 2024-07-29 PROBLEM — C78.02 MALIGNANT NEOPLASM METASTATIC TO BOTH LUNGS: Status: ACTIVE | Noted: 2024-07-29

## 2024-07-29 PROBLEM — C78.01 MALIGNANT NEOPLASM METASTATIC TO BOTH LUNGS: Status: ACTIVE | Noted: 2024-07-29

## 2024-07-29 NOTE — PROGRESS NOTES
Reviewed chart. Patient met with Dr. Ellis 7/25, discussed liver biopsy results, which revealed metastatic melanoma. PET scan revealed multiple metastatic hypermetabolic bone lesions including the T6 vertebral body and also the bilateral iliac wings and also the left sacrum close to the SI joint. Dr. Ellis recommended to test his tumor sample by [facility] NGS study for further molecular study to see if the patient will have BRAF mutation so he would be a candidate for oral TKI treatment. Then, then we also would not look into whether this patient would be a candidate for other targeted therapy as well as immunotherapy. Dr. Ellis ordered Zometa every 4 weeks for bone mets. He also ordered MRI of brain, scheduled 8/5. He meets with Dr. Ellis again 8/14 to discuss [facility] results and finalize treatment plan.    Called patient. Left message that I was just touching base to see how he was doing. Wanted to know if he had any questions/concerns after his recent liver biopsy and appt with Dr. Ellis or resource/supportive care needs; requested cb

## 2024-07-31 ENCOUNTER — PATIENT OUTREACH (OUTPATIENT)
Dept: OTHER | Facility: HOSPITAL | Age: 64
End: 2024-07-31
Payer: MEDICARE

## 2024-07-31 ENCOUNTER — INFUSION (OUTPATIENT)
Dept: ONCOLOGY | Facility: HOSPITAL | Age: 64
End: 2024-07-31
Payer: MEDICARE

## 2024-07-31 VITALS
OXYGEN SATURATION: 96 % | BODY MASS INDEX: 29.99 KG/M2 | SYSTOLIC BLOOD PRESSURE: 133 MMHG | RESPIRATION RATE: 16 BRPM | TEMPERATURE: 97.1 F | WEIGHT: 209 LBS | DIASTOLIC BLOOD PRESSURE: 69 MMHG | HEART RATE: 65 BPM

## 2024-07-31 DIAGNOSIS — C79.51 SECONDARY CANCER OF BONE: ICD-10-CM

## 2024-07-31 DIAGNOSIS — C69.92 MALIGNANT NEOPLASM OF LEFT EYE: Primary | ICD-10-CM

## 2024-07-31 DIAGNOSIS — C78.7 CANCER, METASTATIC TO LIVER: ICD-10-CM

## 2024-07-31 DIAGNOSIS — C69.32 MALIGNANT NEOPLASM OF LEFT CHOROID: ICD-10-CM

## 2024-07-31 LAB
ALBUMIN SERPL-MCNC: 3.7 G/DL (ref 3.5–5.2)
ALBUMIN/GLOB SERPL: 1.5 G/DL
ALP SERPL-CCNC: 83 U/L (ref 39–117)
ALT SERPL W P-5'-P-CCNC: 19 U/L (ref 1–41)
ANION GAP SERPL CALCULATED.3IONS-SCNC: 12.4 MMOL/L (ref 5–15)
AST SERPL-CCNC: 25 U/L (ref 1–40)
BASOPHILS # BLD AUTO: 0.09 10*3/MM3 (ref 0–0.2)
BASOPHILS NFR BLD AUTO: 1.1 % (ref 0–1.5)
BILIRUB SERPL-MCNC: 0.2 MG/DL (ref 0–1.2)
BUN SERPL-MCNC: 25 MG/DL (ref 8–23)
BUN/CREAT SERPL: 22.5 (ref 7–25)
CALCIUM SPEC-SCNC: 8.7 MG/DL (ref 8.6–10.5)
CHLORIDE SERPL-SCNC: 104 MMOL/L (ref 98–107)
CO2 SERPL-SCNC: 20.6 MMOL/L (ref 22–29)
CREAT SERPL-MCNC: 1.11 MG/DL (ref 0.76–1.27)
DEPRECATED RDW RBC AUTO: 52.3 FL (ref 37–54)
EGFRCR SERPLBLD CKD-EPI 2021: 74.2 ML/MIN/1.73
EOSINOPHIL # BLD AUTO: 0.6 10*3/MM3 (ref 0–0.4)
EOSINOPHIL NFR BLD AUTO: 7.2 % (ref 0.3–6.2)
ERYTHROCYTE [DISTWIDTH] IN BLOOD BY AUTOMATED COUNT: 15.5 % (ref 12.3–15.4)
GLOBULIN UR ELPH-MCNC: 2.4 GM/DL
GLUCOSE SERPL-MCNC: 168 MG/DL (ref 65–99)
HCT VFR BLD AUTO: 40.9 % (ref 37.5–51)
HGB BLD-MCNC: 13.3 G/DL (ref 13–17.7)
IMM GRANULOCYTES # BLD AUTO: 0.02 10*3/MM3 (ref 0–0.05)
IMM GRANULOCYTES NFR BLD AUTO: 0.2 % (ref 0–0.5)
LYMPHOCYTES # BLD AUTO: 2.65 10*3/MM3 (ref 0.7–3.1)
LYMPHOCYTES NFR BLD AUTO: 32 % (ref 19.6–45.3)
MAGNESIUM SERPL-MCNC: 2.1 MG/DL (ref 1.6–2.4)
MCH RBC QN AUTO: 30 PG (ref 26.6–33)
MCHC RBC AUTO-ENTMCNC: 32.5 G/DL (ref 31.5–35.7)
MCV RBC AUTO: 92.1 FL (ref 79–97)
MONOCYTES # BLD AUTO: 0.97 10*3/MM3 (ref 0.1–0.9)
MONOCYTES NFR BLD AUTO: 11.7 % (ref 5–12)
NEUTROPHILS NFR BLD AUTO: 3.95 10*3/MM3 (ref 1.7–7)
NEUTROPHILS NFR BLD AUTO: 47.8 % (ref 42.7–76)
NRBC BLD AUTO-RTO: 0 /100 WBC (ref 0–0.2)
PHOSPHATE SERPL-MCNC: 3.6 MG/DL (ref 2.5–4.5)
PLATELET # BLD AUTO: 265 10*3/MM3 (ref 140–450)
PMV BLD AUTO: 9.3 FL (ref 6–12)
POTASSIUM SERPL-SCNC: 4.1 MMOL/L (ref 3.5–5.2)
PROT SERPL-MCNC: 6.1 G/DL (ref 6–8.5)
RBC # BLD AUTO: 4.44 10*6/MM3 (ref 4.14–5.8)
SODIUM SERPL-SCNC: 137 MMOL/L (ref 136–145)
WBC NRBC COR # BLD AUTO: 8.28 10*3/MM3 (ref 3.4–10.8)

## 2024-07-31 PROCEDURE — 84100 ASSAY OF PHOSPHORUS: CPT

## 2024-07-31 PROCEDURE — 83735 ASSAY OF MAGNESIUM: CPT

## 2024-07-31 PROCEDURE — 25010000002 ZOLEDRONIC ACID 4 MG/100ML SOLUTION: Performed by: INTERNAL MEDICINE

## 2024-07-31 PROCEDURE — 96374 THER/PROPH/DIAG INJ IV PUSH: CPT

## 2024-07-31 PROCEDURE — 80053 COMPREHEN METABOLIC PANEL: CPT

## 2024-07-31 PROCEDURE — 85025 COMPLETE CBC W/AUTO DIFF WBC: CPT

## 2024-07-31 PROCEDURE — 96365 THER/PROPH/DIAG IV INF INIT: CPT

## 2024-07-31 PROCEDURE — 25810000003 SODIUM CHLORIDE 0.9 % SOLUTION: Performed by: INTERNAL MEDICINE

## 2024-07-31 RX ORDER — ZOLEDRONIC ACID 0.04 MG/ML
4 INJECTION, SOLUTION INTRAVENOUS ONCE
Status: COMPLETED | OUTPATIENT
Start: 2024-07-31 | End: 2024-07-31

## 2024-07-31 RX ORDER — SODIUM CHLORIDE 9 MG/ML
20 INJECTION, SOLUTION INTRAVENOUS ONCE
Status: COMPLETED | OUTPATIENT
Start: 2024-07-31 | End: 2024-07-31

## 2024-07-31 RX ADMIN — SODIUM CHLORIDE 20 ML/HR: 9 INJECTION, SOLUTION INTRAVENOUS at 11:02

## 2024-07-31 RX ADMIN — ZOLEDRONIC ACID 4 MG: 0.04 INJECTION, SOLUTION INTRAVENOUS at 11:02

## 2024-07-31 NOTE — PROGRESS NOTES
Call from patient's friend Jacque (approved contact on chart). She was reading the literature for Zometa and wanted to make sure that we were aware that the patient has a history of Hepatitis C and had recent dental implants.  We discussed this. I will make sure Dr. Ellis is aware of the recent dental implants.  Jacque verbalized appreciation.  The patient is here today for a Zometa infusion. I will try to see in infusion.     Messaged Dr. Ellis     Met patient in infusion today. Reintroduced myself. He is receiving his initial Zometa infusion today.  We discussed my conversation with Jacque today. The patient states he did discuss his dental implants with Dr. Ellis at a previous visit. The patient states he has been trying to reach his dentist although has not connected with him.    He has a MRI of his brain scheduled 8/5 and meets with Dr. Ellis 8/14 to discuss his [employer] NGS results are not yet back.    The patient denies questions or concerns.     We again discussed integrative therapies and other services at [facility] as well as [facility] and [facility] once his treatment plan is determined. Patient expressed gratitude for my support and denied any additional needs at this time. I will continue to follow; encouraged patient to call as needed.

## 2024-08-05 ENCOUNTER — HOSPITAL ENCOUNTER (OUTPATIENT)
Dept: MRI IMAGING | Facility: HOSPITAL | Age: 64
Discharge: HOME | End: 2024-08-05
Admitting: INTERNAL MEDICINE
Payer: MEDICARE

## 2024-08-05 DIAGNOSIS — C79.51 SECONDARY CANCER OF BONE: ICD-10-CM

## 2024-08-05 DIAGNOSIS — C78.7 CANCER, METASTATIC TO LIVER: ICD-10-CM

## 2024-08-05 DIAGNOSIS — C69.32 MALIGNANT NEOPLASM OF LEFT CHOROID: ICD-10-CM

## 2024-08-05 LAB
DNA RANGE(S) EXAMINED NAR: NORMAL
GENE DIS ANL INTERP-IMP: POSITIVE
GENE DIS ASSESSED: NORMAL
GENE MUT TESTED BLD/T: 2.1 M/MB
MSI CA SPEC-IMP: NORMAL
REASON FOR STUDY: NORMAL
TEMPUS GERMLINE NOTE: NORMAL
TEMPUS PERTINENTNEGATIVES: NORMAL
TEMPUS PORTAL: NORMAL
TEMPUS TREATMENT IMPLICATIONS NOTE: NORMAL
TEMPUS TRIALCOUNT: 3
TEMPUS TRIALMATCHES1: NORMAL
TEMPUS TRIALMATCHES2: NORMAL
TEMPUS TRIALMATCHES3: NORMAL
TEMPUS XR RESULT 1: NORMAL

## 2024-08-05 PROCEDURE — A9577 INJ MULTIHANCE: HCPCS | Performed by: INTERNAL MEDICINE

## 2024-08-05 PROCEDURE — 70553 MRI BRAIN STEM W/O & W/DYE: CPT

## 2024-08-05 PROCEDURE — 0 GADOBENATE DIMEGLUMINE 529 MG/ML SOLUTION: Performed by: INTERNAL MEDICINE

## 2024-08-05 RX ADMIN — GADOBENATE DIMEGLUMINE 20 ML: 529 INJECTION, SOLUTION INTRAVENOUS at 08:51

## 2024-08-14 ENCOUNTER — OFFICE VISIT (OUTPATIENT)
Dept: ONCOLOGY | Facility: CLINIC | Age: 64
End: 2024-08-14
Payer: MEDICARE

## 2024-08-14 ENCOUNTER — INFUSION (OUTPATIENT)
Dept: ONCOLOGY | Facility: HOSPITAL | Age: 64
End: 2024-08-14
Payer: MEDICARE

## 2024-08-14 ENCOUNTER — LAB (OUTPATIENT)
Dept: LAB | Facility: HOSPITAL | Age: 64
End: 2024-08-14
Payer: MEDICARE

## 2024-08-14 VITALS
SYSTOLIC BLOOD PRESSURE: 188 MMHG | DIASTOLIC BLOOD PRESSURE: 94 MMHG | BODY MASS INDEX: 30.64 KG/M2 | HEART RATE: 56 BPM | HEIGHT: 70 IN | WEIGHT: 214 LBS | OXYGEN SATURATION: 99 %

## 2024-08-14 DIAGNOSIS — Z79.899 ENCOUNTER FOR LONG-TERM (CURRENT) USE OF HIGH-RISK MEDICATION: ICD-10-CM

## 2024-08-14 DIAGNOSIS — C69.92 MALIGNANT NEOPLASM OF LEFT EYE: ICD-10-CM

## 2024-08-14 DIAGNOSIS — C79.51 SECONDARY CANCER OF BONE: ICD-10-CM

## 2024-08-14 DIAGNOSIS — C78.7 MELANOMA OF UVEA METASTATIC TO LIVER: Primary | ICD-10-CM

## 2024-08-14 DIAGNOSIS — C69.40 MELANOMA OF UVEA METASTATIC TO LIVER: Primary | ICD-10-CM

## 2024-08-14 DIAGNOSIS — C78.7 CANCER, METASTATIC TO LIVER: ICD-10-CM

## 2024-08-14 DIAGNOSIS — C69.32 MALIGNANT NEOPLASM OF LEFT CHOROID: ICD-10-CM

## 2024-08-14 DIAGNOSIS — C43.9 METASTATIC MALIGNANT MELANOMA: ICD-10-CM

## 2024-08-14 PROBLEM — Z45.2 FITTING AND ADJUSTMENT OF VASCULAR CATHETER: Status: ACTIVE | Noted: 2024-08-14

## 2024-08-14 LAB
ALBUMIN SERPL-MCNC: 4 G/DL (ref 3.5–5.2)
ALBUMIN/GLOB SERPL: 1.5 G/DL
ALP SERPL-CCNC: 86 U/L (ref 39–117)
ALT SERPL W P-5'-P-CCNC: 19 U/L (ref 1–41)
ANION GAP SERPL CALCULATED.3IONS-SCNC: 9.3 MMOL/L (ref 5–15)
AST SERPL-CCNC: 25 U/L (ref 1–40)
BASOPHILS # BLD AUTO: 0.12 10*3/MM3 (ref 0–0.2)
BASOPHILS NFR BLD AUTO: 1.1 % (ref 0–1.5)
BILIRUB SERPL-MCNC: 0.2 MG/DL (ref 0–1.2)
BUN SERPL-MCNC: 23 MG/DL (ref 8–23)
BUN/CREAT SERPL: 21.7 (ref 7–25)
CALCIUM SPEC-SCNC: 8.8 MG/DL (ref 8.6–10.5)
CHLORIDE SERPL-SCNC: 106 MMOL/L (ref 98–107)
CO2 SERPL-SCNC: 25.7 MMOL/L (ref 22–29)
CREAT SERPL-MCNC: 1.06 MG/DL (ref 0.76–1.27)
DEPRECATED RDW RBC AUTO: 50.4 FL (ref 37–54)
EGFRCR SERPLBLD CKD-EPI 2021: 78.4 ML/MIN/1.73
EOSINOPHIL # BLD AUTO: 0.87 10*3/MM3 (ref 0–0.4)
EOSINOPHIL NFR BLD AUTO: 7.9 % (ref 0.3–6.2)
ERYTHROCYTE [DISTWIDTH] IN BLOOD BY AUTOMATED COUNT: 15 % (ref 12.3–15.4)
GLOBULIN UR ELPH-MCNC: 2.6 GM/DL
GLUCOSE SERPL-MCNC: 90 MG/DL (ref 65–99)
HCT VFR BLD AUTO: 45.1 % (ref 37.5–51)
HGB BLD-MCNC: 13.7 G/DL (ref 13–17.7)
IMM GRANULOCYTES # BLD AUTO: 0.04 10*3/MM3 (ref 0–0.05)
IMM GRANULOCYTES NFR BLD AUTO: 0.4 % (ref 0–0.5)
LYMPHOCYTES # BLD AUTO: 2.86 10*3/MM3 (ref 0.7–3.1)
LYMPHOCYTES NFR BLD AUTO: 26 % (ref 19.6–45.3)
MCH RBC QN AUTO: 28.1 PG (ref 26.6–33)
MCHC RBC AUTO-ENTMCNC: 30.4 G/DL (ref 31.5–35.7)
MCV RBC AUTO: 92.6 FL (ref 79–97)
MONOCYTES # BLD AUTO: 1.34 10*3/MM3 (ref 0.1–0.9)
MONOCYTES NFR BLD AUTO: 12.2 % (ref 5–12)
NEUTROPHILS NFR BLD AUTO: 5.75 10*3/MM3 (ref 1.7–7)
NEUTROPHILS NFR BLD AUTO: 52.4 % (ref 42.7–76)
NRBC BLD AUTO-RTO: 0 /100 WBC (ref 0–0.2)
PLATELET # BLD AUTO: 261 10*3/MM3 (ref 140–450)
PMV BLD AUTO: 9 FL (ref 6–12)
POTASSIUM SERPL-SCNC: 4.6 MMOL/L (ref 3.5–5.2)
PROT SERPL-MCNC: 6.6 G/DL (ref 6–8.5)
RBC # BLD AUTO: 4.87 10*6/MM3 (ref 4.14–5.8)
SODIUM SERPL-SCNC: 141 MMOL/L (ref 136–145)
WBC NRBC COR # BLD AUTO: 10.98 10*3/MM3 (ref 3.4–10.8)

## 2024-08-14 PROCEDURE — 3077F SYST BP >= 140 MM HG: CPT | Performed by: INTERNAL MEDICINE

## 2024-08-14 PROCEDURE — 99215 OFFICE O/P EST HI 40 MIN: CPT | Performed by: INTERNAL MEDICINE

## 2024-08-14 PROCEDURE — 85025 COMPLETE CBC W/AUTO DIFF WBC: CPT

## 2024-08-14 PROCEDURE — 3080F DIAST BP >= 90 MM HG: CPT | Performed by: INTERNAL MEDICINE

## 2024-08-14 PROCEDURE — 80053 COMPREHEN METABOLIC PANEL: CPT

## 2024-08-14 PROCEDURE — 1125F AMNT PAIN NOTED PAIN PRSNT: CPT | Performed by: INTERNAL MEDICINE

## 2024-08-14 RX ORDER — ONDANSETRON 8 MG/1
8 TABLET, FILM COATED ORAL 3 TIMES DAILY PRN
Qty: 60 TABLET | Refills: 5 | Status: ON HOLD | OUTPATIENT
Start: 2024-08-14 | End: 2024-08-23

## 2024-08-14 RX ORDER — SODIUM CHLORIDE 9 MG/ML
20 INJECTION, SOLUTION INTRAVENOUS ONCE
Status: CANCELLED | OUTPATIENT
Start: 2024-08-27

## 2024-08-14 RX ORDER — SODIUM CHLORIDE 0.9 % (FLUSH) 0.9 %
10 SYRINGE (ML) INJECTION AS NEEDED
Status: CANCELLED | OUTPATIENT
Start: 2024-09-17

## 2024-08-14 RX ORDER — HEPARIN SODIUM (PORCINE) LOCK FLUSH IV SOLN 100 UNIT/ML 100 UNIT/ML
500 SOLUTION INTRAVENOUS AS NEEDED
Status: CANCELLED | OUTPATIENT
Start: 2024-09-17

## 2024-08-14 NOTE — PROGRESS NOTES
.     REASON FOR FOLLOW-UP:     Provide an opinion on any further workup or treatment of metastatic melanoma.                                 HISTORY OF PRESENT ILLNESS:  The patient is a 63 y.o. year old male  who is here for re-evaluation with the above-mentioned history.    History of Present Illness  The patient is here today 8/14/2020 for for re-evaluation to discuss the results of [employer] NGS study and the management plan for his metastatic melanoma.    He recently received his first dose of Zometa treatment on 07/31/2024, which he reports tolerating well without any specific side effects. The [employer] NGS study did not reveal any reportable treatment options.    Patient is at baseline condition.  Continues to have significant [place].  He denies chest pain dyspnea.  His performance status is ECOG 1.  Denies nausea vomiting.  No abdomen pain.  Reports no weight loss.    Results    [facility] NGS study reported a stable MSI and TMB at 2.1 m/MB, negative mutation for BRAF, NRAS and KIT.  There was a pathologic GNA11 p.Q209L mutation at 25.6%.    Brain MRI examination on 8/5/2024 reported no evidence for intracranial metastatic disease.      Past Medical History:   Diagnosis Date    Acute renal failure (ARF) 09/2018    RESOLVED AFTER DEHYDRATION RESOLVED    Anemia     Arthritis     OA AND RA    Asthma     Atrial fibrillation with rapid ventricular response     Bone cancer     BPH (benign prostatic hyperplasia)     Chronic back pain     CKD (chronic kidney disease)     Stage 3    COPD (chronic obstructive pulmonary disease)     Coronary artery calcification seen on CAT scan     Eye cancer, left     TREATMENT WITH RADIATION. SOME VISON LOSS LEFT EYE    Gout     Hepatitis C     IN REMISSION. BEEN LONGER THAN 5 YRS    Hiatal hernia     History of atrial fibrillation     SEPT 2018. WITH SEVERE DEHYDRATION    History of blood transfusion 1978    in [place], no known reaction mild shortness of breath    Hypertension      "Liver cancer     Lung cancer     MRSA infection     HX OF    Pneumonia     Psoriasis     Right hip pain     Short of breath on exertion     Sleep apnea     \"MILD\". DID NOT FOLLOW THUR GETTING MACHINE RELATED TO COST OF MACHINE    Syncope 09/2018    secondary to atrial fibrillation with rapid ventricular response. SEVERELY DEHYDRATED AT THIS TIME.    Viral hepatitis     Vision loss of left eye     EYE CANCER    Vitamin D deficiency      Past Surgical History:   Procedure Laterality Date    BACK SURGERY      LUMBAR DISC X2. NO HARDWARE    CARPAL TUNNEL RELEASE WITH CUBITAL TUNNEL RELEASE Right     COLONOSCOPY      COLONOSCOPY N/A 07/30/2021    Procedure: COLONOSCOPY;  Surgeon: Flor Maciel MD;  Location: [facility] MAIN OR;  Service: Gastroenterology;  Laterality: N/A;  Diverticulosis    EYE SURGERY Left     Left Eye    LUMBAR SPINE SURGERY      x3 in 2000. 2010, 2011    REVISION TOTAL KNEE ARTHROPLASTY Right     TOTAL HIP ARTHROPLASTY Right 05/28/2019    Procedure: RIGHT TOTAL HIP ARTHROPLASTY;  Surgeon: Harish Dominguez MD;  Location: [facility] OR;  Service: Orthopedics    TOTAL KNEE ARTHROPLASTY Right     TOTAL KNEE ARTHROPLASTY Left 04/25/2024    Procedure: TOTAL KNEE ARTHROPLASTY;  Surgeon: Harish Dominguez MD;  Location: [facility];  Service: Orthopedics;  Laterality: Left;         ONCOLOGY HISTORY:  The patient is a 63 y.o. year old male  who is here for initial evaluation on 7/25/2024 for evaluation of newly diagnosed metastatic melanoma.  This patient has end-stage arthritis required bilateral knee replacement, history of left eye melanoma, lumbar stenosis status post discectomy, hepatitis C, hypertension, and psoriasis.    His primary doctor found lung issues first.  Patient reports he was congested with a cough and dyspnea for about a month.  Patient went to see his primary care physician Dr. Joshua Blake on 4/17/2024 who prescribed doxycycline and had a chest ray examination which reported " multiple pulmonary nodules.  He subsequently had a chest CT on 5/152024 which reported a 1.7 cm nodule opacity in the right lower lobe with additional scattered small bilateral pulmonary nodules.  It also reported slightly increased size of mildly asymmetric prominent left axillary lymph nodes.  There is also mild asymmetric elevation of the left hemidiaphragm with a new segmental atelectasis and scarring at the left lung base.    Patient has subsequently had a PET scan examination at the [facility], and the reported the right lower lobe 1.6 cm solid nodule with a maximum SUV 3.6.  Multiple additional bilateral subcentimeter solid nodules too small for accurate PET characterization but mainly had uptake greater than background 11.  There was no enlarged hypermetabolic mediastinal or hilar lymph nodes.  In the abdomen, there were 2 dominant hypermetabolic hypodense right hepatic lesions with reference 2.7 cm with SUV 11.1.  There is at least 3 additional smaller hypermetabolic pedicle foci with the left hepatic lobe SUV 3.7.  The lesion in the caudate lobe had a maximum SUV 5.5.  There are multiple pulm lesions, the T6 lesion had SUV 10.8, and the right ilium lesion maximum SUV 10.7, and the right sacrum lesion SUV 9.7, and the left supra-acetabular ilium lesion with SUV 4.6.    The patient was seen by thoracic surgeon Dr. Riggs on 7/11/2024, Dr. Riggs recommended CT-guided core needle biopsy of the liver lesion.  This was done on 7/19/2024.  Pathology evaluation reported metastatic melanoma.     Patient reports today that he was diagnosed with melanoma in his left eye approximately 12 years ago during a routine eye exam. At that time, his eyesight was unimpaired, but a patch was placed in his eye. He spent about a week in a room following the procedure. He lost approximately 40 percent of his vision due to blockage. He received radiation therapy in [facility].     He experienced a minor headache a  few weeks ago, which is unusual for him. He denies experiencing nausea, vomiting, or abdominal pain. His appetite is normal. He experienced weight loss of 10 pounds about 1 to 2 months ago, but has since regained it and is attempting to lose weight again.    He is scheduled for an MRI of the spine on 08/09/2024. His back pain, which he rates as 6 out of 10 at its worst, is located in the lower back at the sacrum area. He also reports weakness in his legs and is not currently taking any medication for the pain. He has consulted with a neurosurgeon and has informed them of his cancer diagnosis.    Laboratory Studies on 7/25/2024  Hemoglobin 13.7 MCV 92.4, platelets 282,000 WBC 9360 neutrophils 4490 lymphocytes 3170, monocytes 1000 eosinophil 540 basophil 100 and immature granulocytes 60.    Biopsy from the liver confirmed metastatic melanoma.    MEDICATIONS    Current Outpatient Medications:     albuterol (PROVENTIL HFA;VENTOLIN HFA) 108 (90 BASE) MCG/ACT inhaler, Inhale 2 puffs every 4 (four) hours as needed for wheezing., Disp: 1 inhaler, Rfl: 3    allopurinol (ZYLOPRIM) 100 MG tablet, Take 1 tablet by mouth 2 (Two) Times a Day., Disp: , Rfl:     amLODIPine (NORVASC) 10 MG tablet, Take 1 tablet by mouth Daily., Disp: , Rfl: 6    cholecalciferol (VITAMIN D3) 25 MCG (1000 UT) tablet, Take 1 tablet by mouth Daily., Disp: , Rfl:     Enbrel SureClick 50 MG/ML solution auto-injector, Inject 50 mg under the skin into the appropriate area as directed 1 (One) Time Per Week., Disp: , Rfl:     gabapentin (NEURONTIN) 100 MG capsule, Take 1 capsule by mouth 3 (Three) Times a Day., Disp: , Rfl:     hydrochlorothiazide (HYDRODIURIL) 12.5 MG tablet, Take 1 tablet by mouth Daily., Disp: , Rfl: 1    lisinopril (PRINIVIL,ZESTRIL) 40 MG tablet, Take 1 tablet by mouth Daily., Disp: , Rfl: 6    Omega-3 Fatty Acids (fish oil) 1000 MG capsule capsule, Take 1 capsule by mouth Daily With Breakfast. HOLD PER MD INSTR, Disp: , Rfl:      oxyCODONE-acetaminophen (PERCOCET) 5-325 MG per tablet, Take 1-2 tablets by mouth Every 4 (Four) Hours As Needed (pain)., Disp: 42 tablet, Rfl: 0    sildenafil (VIAGRA) 100 MG tablet, Take 1 tablet by mouth Daily As Needed., Disp: , Rfl:     SYMBICORT 160-4.5 MCG/ACT inhaler, Inhale 2 puffs 2 (Two) Times a Day., Disp: , Rfl: 12    tamsulosin (FLOMAX) 0.4 MG capsule 24 hr capsule, TAKE 1 CAPSULE EVERY DAY (Patient taking differently: 1 capsule 2 (Two) Times a Day.), Disp: 90 capsule, Rfl: 0    folic acid (FOLVITE) 1 MG tablet, Take 1 tablet by mouth Daily., Disp: , Rfl:     ALLERGIES:   No Known Allergies    SOCIAL HISTORY:       Social History     Socioeconomic History    Marital status: Single   Tobacco Use    Smoking status: Never     Passive exposure: Never    Smokeless tobacco: Never   Vaping Use    Vaping status: Never Used   Substance and Sexual Activity    Alcohol use: No     Comment: no caffeine     Drug use: No    Sexual activity: Defer   He is a  but has never smoked. He used to drink 2 drinks a day, but he has not drunk in over 20 to 25 years.      FAMILY HISTORY:  Family History   Problem Relation Age of Onset    Depression Mother     COPD Mother     Mental illness Mother     Diabetes Father     Hypertension Father     Heart disease Father     Hyperlipidemia Father     Cancer Father     Liver cancer Father     Malig Hyperthermia Neg Hx     Drug abuse Neg Hx     Allergies Neg Hx     Asthma Neg Hx     Stroke Neg Hx    His father had liver, bone cancer. He  of the cancer.  No details available.    REVIEW OF SYSTEMS:  Review of Systems   Constitutional:  Positive for fatigue and unexpected weight change. Negative for appetite change, diaphoresis and fever.   HENT:  Negative for mouth sores, sinus pressure and trouble swallowing.    Eyes:  Positive for visual disturbance.   Respiratory:  Negative for cough and shortness of breath.    Cardiovascular:  Negative for chest pain and leg swelling.  "  Gastrointestinal:  Negative for abdominal distention, abdominal pain, anal bleeding, blood in stool, nausea and vomiting.   Genitourinary:  Negative for dysuria and hematuria.   Musculoskeletal:  Positive for back pain. Negative for joint swelling and neck pain.   Skin:  Negative for pallor.   Allergic/Immunologic: Positive for immunocompromised state.   Neurological:  Negative for seizures and headaches.   Hematological:  Negative for adenopathy.   Psychiatric/Behavioral:  Negative for confusion.               Vitals:    08/14/24 1442   BP: (!) 188/94   Pulse: 56   SpO2: 99%   Weight: 97.1 kg (214 lb)   Height: 178.8 cm (70.39\")   PainSc:   6   PainLoc: Generalized         8/14/2024     2:41 PM   Current Status   ECOG score 0      PHYSICAL EXAM:      Physical Exam  GENERAL:  Well-developed, well-nourished in no acute distress.    SKIN:  Warm, dry without rashes, purpura or petechiae.  HEENT:  Normocephalic.   LYMPHATICS:  No cervical, supraclavicular or axillary adenopathy.  CHEST: Normal respiratory effort.  Lungs clear to auscultation. Good airflow.  CARDIAC:  Regular rate and rhythm. Normal S1,S2.  ABDOMEN:  Soft, no tender.  Bowel sounds normal.  EXTREMITIES:  No lower extremity edema.          RECENT LABS:        WBC   Date Value Ref Range Status   08/14/2024 10.98 (H) 3.40 - 10.80 10*3/mm3 Final   07/31/2024 8.28 3.40 - 10.80 10*3/mm3 Final   07/25/2024 9.36 3.40 - 10.80 10*3/mm3 Final   04/26/2024 21.45 (H) 3.40 - 10.80 10*3/mm3 Final   03/26/2024 9.41 3.40 - 10.80 10*3/mm3 Final   10/19/2021 8.82 4.5 - 11.0 10*3/uL Final   01/06/2021 9.81 3.40 - 10.80 10*3/mm3 Final   05/29/2019 18.94 (H) 3.40 - 10.80 10*3/mm3 Final   05/22/2019 9.67 3.40 - 10.80 10*3/mm3 Final   03/18/2019 11.1 4.5 - 11.5 10*3/uL Final   12/19/2018 9.0 4.5 - 11.0 10*3/uL Final   09/01/2018 10.51 4.50 - 10.70 10*3/mm3 Final   08/31/2018 9.91 4.50 - 10.70 10*3/mm3 Final   08/30/2018 10.48 4.50 - 10.70 10*3/mm3 Final   08/30/2018 10.22 4.50 " - 10.70 10*3/mm3 Final   08/16/2018 TNP Thousand/uL K/uL Final   05/02/2018 7.8 THOUSAND/UL 3.8 - 10.8 K/uL Final   01/31/2018 9.1 THOUSAND/UL 3.8 - 10.8 K/uL Final   07/13/2017 9.4 THOUSAND/UL 3.8 - 10.8 K/uL Final   04/09/2017 11.8 THOUSAND/UL (H) 3.8 - 10.8 K/uL Final   01/05/2017 9.8 THOUSAND/UL 3.8 - 10.8 K/uL Final   02/16/2015 10.96 (H) 4.50 - 10.70 K/Cumm Final   08/30/2014 11.69 (H) 4.50 - 10.70 K/Cumm Final   08/29/2014 15.59 (H) 4.50 - 10.70 K/Cumm Final     Hemoglobin   Date Value Ref Range Status   08/14/2024 13.7 13.0 - 17.7 g/dL Final   07/31/2024 13.3 13.0 - 17.7 g/dL Final   07/25/2024 13.7 13.0 - 17.7 g/dL Final   04/26/2024 10.9 (L) 13.0 - 17.7 g/dL Final   03/26/2024 13.7 13.0 - 17.7 g/dL Final   10/19/2021 13.8 13.5 - 17.5 g/dL Final   01/06/2021 15.4 13.0 - 17.7 g/dL Final   05/29/2019 12.7 (L) 13.0 - 17.7 g/dL Final   05/22/2019 15.0 13.0 - 17.7 g/dL Final   03/18/2019 14.4 14.0 - 18.0 g/dL Final   12/19/2018 15.7 13.5 - 17.5 g/dL Final   09/01/2018 13.0 (L) 13.7 - 17.6 g/dL Final   08/31/2018 13.0 (L) 13.7 - 17.6 g/dL Final   08/30/2018 13.3 (L) 13.7 - 17.6 g/dL Final   08/30/2018 12.8 (L) 13.7 - 17.6 g/dL Final   05/02/2018 15.0 13.2 - 17.1 g/dL Final   01/31/2018 16.7 13.2 - 17.1 g/dL Final   07/13/2017 17.4 (H) 13.2 - 17.1 g/dL Final   04/09/2017 15.3 13.2 - 17.1 g/dL Final   01/05/2017 15.7 13.2 - 17.1 g/dL Final   02/28/2015 13.3 (L) 13.7 - 17.6 g/dL Final   02/16/2015 15.3 13.7 - 17.6 g/dL Final   08/30/2014 14.2 13.7 - 17.6 g/dL Final   08/29/2014 16.1 13.7 - 17.6 g/dL Final     Platelets   Date Value Ref Range Status   08/14/2024 261 140 - 450 10*3/mm3 Final   07/31/2024 265 140 - 450 10*3/mm3 Final   07/25/2024 282 140 - 450 10*3/mm3 Final   07/19/2024 298 140 - 450 10*3/mm3 Final   04/26/2024 196 140 - 450 10*3/mm3 Final   03/26/2024 287 140 - 450 10*3/mm3 Final   10/19/2021 281 140 - 440 10*3/uL Final   01/06/2021 290 140 - 450 10*3/mm3 Final   05/29/2019 238 140 - 450 10*3/mm3  Final   05/22/2019 298 140 - 450 10*3/mm3 Final   03/18/2019 282 150 - 450 10*3/uL Final   12/19/2018 219 140 - 440 10*3/uL Final   09/01/2018 186 140 - 500 10*3/mm3 Final   08/31/2018 179 140 - 500 10*3/mm3 Final   08/30/2018 201 140 - 500 10*3/mm3 Final   08/30/2018 193 140 - 500 10*3/mm3 Final   05/02/2018 235 THOUSAND/ - 400 10*3/mm3 Final   01/31/2018 253 THOUSAND/ - 400 10*3/mm3 Final   07/13/2017 243 THOUSAND/ - 400 10*3/mm3 Final   04/09/2017 237 THOUSAND/ - 400 10*3/mm3 Final   01/05/2017 231 THOUSAND/ - 400 10*3/mm3 Final   02/16/2015 254 140 - 500 K/Cumm Final   08/30/2014 192 140 - 500 K/Cumm Final   08/29/2014 229 140 - 500 K/Cumm Final       IMAGING:   MRI Brain With & Without Contrast (08/05/2024 08:52)     MRI Lumbar Spine Wo & W Con (08/09/2024 16:01)       Assessment & Plan   Assessment & Plan  1. Metastatic melanoma in the liver, bilateral lungs and bones.  Patient has a history of left eye melanoma 12 years ago.  Patient had a cough and x-ray subsequently chest CT scan 7/15/2024 showed left lower lobe pulmonary nodule 1.7 cm, and multiple other smaller pulmonary nodules.  PET scan on 7/31/2024 reported multiple hypermetabolic lesions. The patient also has multiple metastatic hypermetabolic bone lesions including the T6 vertebral body and also the bilateral iliac wings and also the left sacrum close to the SI joint.   Patient had a liver biopsy confirmed to be metastatic melanoma.  I discussed with the patient the on 7/25/2024 that I recommended to test his tumor sample by [employer] NGS study to see if the patient will have BRAF mutation so he would be a candidate for oral TKI treatment. Then, we also would look into whether this patient would be a candidate for other targeted therapy as well as immunotherapy.   Arrangements will be made for brain MRI with and without contrast.  MRI on 8/5/2024 reported no evidence for intracranial metastatic disease.  [employer] NGS study  reported stable MSI and TMB at 2.1 m/MB, negative mutation for BRAF, NRAS and KIT.  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  Discussed with the patient today on 8/14/2024, he is not a candidate for targeted therapy using oral TKI due to lack of mutation from BRAF NRAS and KIT.  I discussed with the patient for immunotherapy.  We discussed the single agent pembrolizumab versus doublets using nivolumab plus ipilimumab.  Due to the side effects profile, I recommended using a single agent pembrolizumab every 3 weeks due to its better tolerance.  Patient is agreeable.      2.  Metastatic cancer to the bone.   PET scan examination showed multiple bone lesions.    I recommended to start the patient on IV Zometa every 4 weeks.  Patient received first dose of Zometa on 7/31/2024.  Had good tolerance.    3.  Back pain.  PET scan examination showed multiple bone lesions including T6 vertebral body, and the sacrum and the pelvic bone..    The patient reports low back pain and I looked at the PET scan images. I do not think necessarily the small left SI joint area bone lesion causing the problem. He reports that he was seen by neurosurgeon and is scheduled to have a spine MRI examination on 08/09/2024.  I certainly would like to see the results of that to make final assessment, to see whether this patient would benefit from some radiation therapy or he has lower back pain from other course. He previously had multiple lumbar spine surgeries for pinched nerve/discectomy.  MRI for T/L spine examination at the [facility] 8/9/2024 for new reported no evidence for metastatic disease.  There were extensive multilevel spondylosis in the T-spine and the moderate narrowing of the spinal canal C7-T1 and T4-T5.  The L-spine MRI examination reported marrow replacing lesion in the left sacral gale 1.2 cm, and also the lesion in the left posterior iliac bone measuring 1.7 cm.   There was also evidence of postsurgical changes.      PLAN:  Arrange a teaching lesson for pembrolizumab repeating every 3 weeks.  Patient will star first cycle of pembrolizumab next week.  Lab study per routine protocol.  Refer patient to general surgery for portacatheter placement.  First cycle treatment could be given through peripheral vein.  Management of hypertension and followed by PCP..    I will see patient in 4 weeks for reevaluation prior to cycle #2 of pembrolizumab treatment.  Patient would also be due for Zometa at that time.  Laboratory studies per protocol.    I spent 50 minutes caring for Renzo on this date of service. This time includes time spent by me in the following activities: preparing for the visit, reviewing tests, obtaining and/or reviewing a separately obtained history, performing a medically appropriate examination and/or evaluation, counseling and educating the patient/family/caregiver, ordering medications, tests, or procedures, referring and communicating with other health care professionals, documenting information in the medical record, independently interpreting results and communicating that information with the patient/family/caregiver and care coordination          DARYL BERMUDEZ M.D., Ph.D.        CC:    MD Hayden Ruano Stephen C., MD Sarah Seifert, PA-C Samuel Walling, MD

## 2024-08-16 ENCOUNTER — TELEPHONE (OUTPATIENT)
Dept: SURGERY | Facility: CLINIC | Age: 64
End: 2024-08-16
Payer: MEDICARE

## 2024-08-16 NOTE — TELEPHONE ENCOUNTER
Attempted to contact patient to be scheduled 8/20 for port placement. Is okay per Dr. Wilhelm. Left voicemail to call back.

## 2024-08-20 ENCOUNTER — OFFICE VISIT (OUTPATIENT)
Dept: SURGERY | Facility: CLINIC | Age: 64
End: 2024-08-20
Payer: MEDICARE

## 2024-08-20 VITALS
BODY MASS INDEX: 30.35 KG/M2 | SYSTOLIC BLOOD PRESSURE: 144 MMHG | WEIGHT: 212 LBS | HEIGHT: 70 IN | DIASTOLIC BLOOD PRESSURE: 88 MMHG

## 2024-08-20 DIAGNOSIS — C69.32 MALIGNANT NEOPLASM OF LEFT CHOROID: ICD-10-CM

## 2024-08-20 DIAGNOSIS — C78.7 MELANOMA OF UVEA METASTATIC TO LIVER: Primary | ICD-10-CM

## 2024-08-20 DIAGNOSIS — C69.40 MELANOMA OF UVEA METASTATIC TO LIVER: Primary | ICD-10-CM

## 2024-08-20 PROBLEM — E66.9 OBESITY (BMI 30.0-34.9): Status: ACTIVE | Noted: 2024-08-20

## 2024-08-20 PROBLEM — E66.811 OBESITY (BMI 30.0-34.9): Status: ACTIVE | Noted: 2024-08-20

## 2024-08-20 PROCEDURE — 1160F RVW MEDS BY RX/DR IN RCRD: CPT | Performed by: SURGERY

## 2024-08-20 PROCEDURE — 3077F SYST BP >= 140 MM HG: CPT | Performed by: SURGERY

## 2024-08-20 PROCEDURE — 1159F MED LIST DOCD IN RCRD: CPT | Performed by: SURGERY

## 2024-08-20 PROCEDURE — 99204 OFFICE O/P NEW MOD 45 MIN: CPT | Performed by: SURGERY

## 2024-08-20 PROCEDURE — 3079F DIAST BP 80-89 MM HG: CPT | Performed by: SURGERY

## 2024-08-20 NOTE — PRE-PROCEDURE INSTRUCTIONS
PAT call complete. Education provided to the patient on the following:    - Nothing to eat after midnight the night before your procedure, water and black coffee okay up to 6:00 AM, 2 hours before arrival time.  - If diabetic and procedure is after noon: No food 8 hours prior to arrival time, and only then only clear liquids 2 hours before arrival time.   - You will need to have someone drive you home after your procedure and remain with you for 24 hours after. The  will need to remain on site during your visit.  - Please remove all jewelry, including body piercing's, and leave any valuables at home. Only bring your drivers license and insurance card on day of procedure.  - Do not wear contact lenses; wear glasses and bring your case.  - Wash with antibacterial soap (such as Dial) the night before and morning of procedure.  - Be prepared to provide your last dose of all home medications.  - Coffee and vending available on the 1st and 5th floors; no cafeteria on site.  - You will need to arrive at 0800 on 8/23/24 at [facility] located at [address]. We are located on the 5th floor.  -Please be aware that arrival times may be subject to change up until the day of surgery.   - Feel free to contact us at: [phone] with any additional questions/concerns.    Patient instructed to hold all supplements and vitamins for procedure. Instructed to hold Lisinopril and HCTZ morning of procedure. Instructed to take Amlodipine and use Albuterol inhaler the morning of the procedure. Patient verbalized understanding.

## 2024-08-20 NOTE — DISCHARGE INSTRUCTIONS
POST OP RECOMMENDATIONS  Dr. Lionel Wilhelm  [phone]  Discharge Instructions for Port Placement    Go home, rest and take it easy today; however, you should get up and move about several times today to reduce the risk of developing a blood clot in your legs.   You may experience some dizziness or memory loss from the anesthesia. This may last for the next 24 hours. Someone should plan on staying with you for the first 24 hours for your safety.   Do not make any important legal decisions or sign any legal papers for the next 24 hours.   Eat and drink lightly today. Start off with liquids, jello, soup, crackers or other bland foods at first. You may advance your diet tomorrow as tolerated as long as you do not experience any nausea or vomiting.   If present, you may remove your outer dressings in 3 days. The white tapes called steri-strips should stay in place. They will fall off on their own in 1-2 weeks. Do not worry if they come off sooner. Otherwise, glue covering your incisions will fall off in the next 1-2 weeks.  You may notice some bleeding/drainage on your outer dressings. A little bloody drainage is normal. If the bleeding/drainage is such that the bandage cannot absorb it, remove the dressing, apply clean gauze and apply firm pressure for a full 15 minutes. If the bleeding continues, please call me.   You may shower tomorrow. No tub baths until your incisions are completely healed.   You will have some pain and discomfort after surgery.  You will not be totally pain free, but tylenol and ibuprofen should make the pain more tolerable.  Unless you have been previously instructed to not take tylenol or ibuprofen, you can alternate these medications every 4 hours and take as instructed on each bottle. Please take any ibuprofen with food to avoid nausea and GI upset. If you are having severe pain that cannot be controlled by OTC pain medicine, please contact me.   You have also received a prescription for an  anti-nausea medicine. Please take this as prescribed for any nausea or vomiting. Nausea could be a result of the anesthesia. If you experience severe nausea and vomiting that cannot be controlled by the nausea medicine, please call me.   No driving for 24 hours and until you fell comfortable making sudden movements with your neck and arms.   If the port is to be used within 10-14 days of placement, no surgical follow-up is needed. Otherwise, you should call the office at [phone] to schedule a follow-up in about 1 week.   Remember to contact me for any of the following:   Fever > 101 degrees  Severe pain that cannot be controlled by taking your pain pills  Severe nausea or vomiting that cannot be controlled by taking your nausea pills  Significant bleeding of your incisions  Drainage that has a bad smell or is yellow or green in appearance  Any other questions or concerns

## 2024-08-20 NOTE — PROGRESS NOTES
"Colorectal & General Surgery  Consultation    Patient: Renzo Streeter  YOB: 1960  MRN: [id number]      Assessment  Renzo Streeter is a 63 y.o. male with metastatic melanoma who presents for evaluation for port placement.  We discussed the risk, benefits, terms of the procedure as well as the mechanics of a port.  He seems to understand all of this.  He wishes to proceed with port placement.  We will get him on the schedule for later this week.    Referring Provider: Jasmine Ellis MD PhD     Reason for Consultation: Port placement    History of Present Illness   Renzo Streeter is a 63 y.o. male who presents with metastatic melanoma.  He feels well today.  He is ready to start systemic therapy.    Past Medical History   Past Medical History:   Diagnosis Date    Acute renal failure (ARF) 09/2018    RESOLVED AFTER DEHYDRATION RESOLVED    Anemia     Arthritis     OA AND RA    Asthma     Atrial fibrillation with rapid ventricular response     Bone cancer     BPH (benign prostatic hyperplasia)     Chronic back pain     CKD (chronic kidney disease)     Stage 3    COPD (chronic obstructive pulmonary disease)     Coronary artery calcification seen on CAT scan     Eye cancer, left     TREATMENT WITH RADIATION. SOME VISON LOSS LEFT EYE    Gout     Hepatitis C     IN REMISSION. BEEN LONGER THAN 5 YRS    Hiatal hernia     History of atrial fibrillation     SEPT 2018. WITH SEVERE DEHYDRATION    History of blood transfusion 1978    in [place], no known reaction mild shortness of breath    Hypertension     Liver cancer     Lung cancer     MRSA infection     HX OF    Pneumonia     Psoriasis     Right hip pain     Short of breath on exertion     Sleep apnea     \"MILD\". DID NOT FOLLOW THUR GETTING MACHINE RELATED TO COST OF MACHINE    Syncope 09/2018    secondary to atrial fibrillation with rapid ventricular response. SEVERELY DEHYDRATED AT THIS TIME.    Viral hepatitis     Vision loss of left eye     EYE " CANCER    Vitamin D deficiency         Past Surgical History   Past Surgical History:   Procedure Laterality Date    BACK SURGERY      LUMBAR DISC X2. NO HARDWARE    CARPAL TUNNEL RELEASE WITH CUBITAL TUNNEL RELEASE Right     COLONOSCOPY      COLONOSCOPY N/A 07/30/2021    Procedure: COLONOSCOPY;  Surgeon: Flor Maciel MD;  [place] [facility] [place];  Service: Gastroenterology;  Laterality: N/A;  Diverticulosis    EYE SURGERY Left     Left Eye    HERNIA REPAIR  2016    LUMBAR SPINE SURGERY      x3 in 2000. 2010, 2011    REVISION TOTAL KNEE ARTHROPLASTY Right     TOTAL HIP ARTHROPLASTY Right 05/28/2019    Procedure: RIGHT TOTAL HIP ARTHROPLASTY;  Surgeon: Harish Dominguez MD;  Location: [facility] MAIN OR;  Service: Orthopedics    TOTAL KNEE ARTHROPLASTY Right     TOTAL KNEE ARTHROPLASTY Left 04/25/2024    Procedure: TOTAL KNEE ARTHROPLASTY;  Surgeon: Harish Dominguez MD;  Location: [facility] OR [facility] [place];  Service: Orthopedics;  Laterality: Left;       Social History  Social History     Socioeconomic History    Marital status: Single   Tobacco Use    Smoking status: Never     Passive exposure: Never    Smokeless tobacco: Never   Vaping Use    Vaping status: Never Used   Substance and Sexual Activity    Alcohol use: No     Comment: no caffeine     Drug use: No    Sexual activity: Defer       Family History  Family History   Problem Relation Age of Onset    Depression Mother     COPD Mother     Mental illness Mother     Diabetes Father     Hypertension Father     Heart disease Father     Hyperlipidemia Father     Cancer Father     Liver cancer Father     Malig Hyperthermia Neg Hx     Drug abuse Neg Hx     Allergies Neg Hx     Asthma Neg Hx     Stroke Neg Hx        Colorectal cancer family history: None    Review of Systems  Negative except as documented in the HPI.     Allergies  No Known Allergies    Medications    Current Outpatient Medications:     albuterol (PROVENTIL HFA;VENTOLIN HFA) 108 (90 BASE) MCG/ACT inhaler,  Inhale 2 puffs every 4 (four) hours as needed for wheezing., Disp: 1 inhaler, Rfl: 3    allopurinol (ZYLOPRIM) 100 MG tablet, Take 1 tablet by mouth 2 (Two) Times a Day., Disp: , Rfl:     amLODIPine (NORVASC) 10 MG tablet, Take 1 tablet by mouth Daily., Disp: , Rfl: 6    cholecalciferol (VITAMIN D3) 25 MCG (1000 UT) tablet, Take 1 tablet by mouth Daily., Disp: , Rfl:     Cyanocobalamin (VITAMIN B-12 PO), Take  by mouth., Disp: , Rfl:     Enbrel SureClick 50 MG/ML solution auto-injector, Inject 50 mg under the skin into the appropriate area as directed 1 (One) Time Per Week., Disp: , Rfl:     gabapentin (NEURONTIN) 100 MG capsule, Take 1 capsule by mouth 3 (Three) Times a Day., Disp: , Rfl:     hydrochlorothiazide (HYDRODIURIL) 12.5 MG tablet, Take 1 tablet by mouth Daily., Disp: , Rfl: 1    lisinopril (PRINIVIL,ZESTRIL) 40 MG tablet, Take 1 tablet by mouth Daily., Disp: , Rfl: 6    Omega-3 Fatty Acids (fish oil) 1000 MG capsule capsule, Take 1 capsule by mouth Daily With Breakfast. HOLD PER MD INSTR, Disp: , Rfl:     oxyCODONE-acetaminophen (PERCOCET) 5-325 MG per tablet, Take 1-2 tablets by mouth Every 4 (Four) Hours As Needed (pain)., Disp: 42 tablet, Rfl: 0    tamsulosin (FLOMAX) 0.4 MG capsule 24 hr capsule, TAKE 1 CAPSULE EVERY DAY (Patient taking differently: 1 capsule 2 (Two) Times a Day.), Disp: 90 capsule, Rfl: 0    ondansetron (ZOFRAN) 8 MG tablet, Take 1 tablet by mouth 3 (Three) Times a Day As Needed for Nausea or Vomiting. (Patient not taking: Reported on 8/20/2024), Disp: 60 tablet, Rfl: 5    Vital Signs  Vitals:    08/20/24 1108   BP: 144/88        Physical Exam  Constitutional: Resting comfortably, no acute distress  Neck: Supple, trachea midline  Respiratory: No increased work of breathing, Symmetric excursion  Cardiovascular: Well pefursed, no jugular venous distention evident   Abdominal:  Soft, non-tender, non-distended  Lymphatics: No cervical or suprascapular adenopathy  Skin: Warm, dry, no  rash on visualized skin surfaces  Musculoskeletal: Symmetric strength, no obvious gross abnormalities  Psychiatric: Alert and oriented ×3, normal affect     Laboratory Results  I have personally reviewed CBC with WC 10, Humoryl 13, platelet 261.  CMP with creatinine 1.0, albumin 4.0.    Radiology  I have personally reviewed cross-sectional imaging from his PET scan performed on May 31, 2024 for surgical planning of internal jugular venous access.       Lionel Wilhelm MD  Colorectal & General Surgery  [facility] [place]    [address], [address]  [place], [place], [address]  P: [phone]  F: [phone]

## 2024-08-20 NOTE — H&P (VIEW-ONLY)
"Colorectal & General Surgery  Consultation    Patient: Renzo Streeter  YOB: 1960  MRN: [id number]      Assessment  Renzo Streeter is a 63 y.o. male with metastatic melanoma who presents for evaluation for port placement.  We discussed the risk, benefits, terms of the procedure as well as the mechanics of a port.  He seems to understand all of this.  He wishes to proceed with port placement.  We will get him on the schedule for later this week.    Referring Provider: Jasmine Ellis MD PhD     Reason for Consultation: Port placement    History of Present Illness   Renzo Streeter is a 63 y.o. male who presents with metastatic melanoma.  He feels well today.  He is ready to start systemic therapy.    Past Medical History   Past Medical History:   Diagnosis Date    Acute renal failure (ARF) 09/2018    RESOLVED AFTER DEHYDRATION RESOLVED    Anemia     Arthritis     OA AND RA    Asthma     Atrial fibrillation with rapid ventricular response     Bone cancer     BPH (benign prostatic hyperplasia)     Chronic back pain     CKD (chronic kidney disease)     Stage 3    COPD (chronic obstructive pulmonary disease)     Coronary artery calcification seen on CAT scan     Eye cancer, left     TREATMENT WITH RADIATION. SOME VISON LOSS LEFT EYE    Gout     Hepatitis C     IN REMISSION. BEEN LONGER THAN 5 YRS    Hiatal hernia     History of atrial fibrillation     SEPT 2018. WITH SEVERE DEHYDRATION    History of blood transfusion 1978    in [place], no known reaction mild shortness of breath    Hypertension     Liver cancer     Lung cancer     MRSA infection     HX OF    Pneumonia     Psoriasis     Right hip pain     Short of breath on exertion     Sleep apnea     \"MILD\". DID NOT FOLLOW THUR GETTING MACHINE RELATED TO COST OF MACHINE    Syncope 09/2018    secondary to atrial fibrillation with rapid ventricular response. SEVERELY DEHYDRATED AT THIS TIME.    Viral hepatitis     Vision loss of left eye     EYE " CANCER    Vitamin D deficiency         Past Surgical History   Past Surgical History:   Procedure Laterality Date    BACK SURGERY      LUMBAR DISC X2. NO HARDWARE    CARPAL TUNNEL RELEASE WITH CUBITAL TUNNEL RELEASE Right     COLONOSCOPY      COLONOSCOPY N/A 07/30/2021    Procedure: COLONOSCOPY;  Surgeon: Flor Maciel MD;  Location: [facility] [place];  Service: Gastroenterology;  Laterality: N/A;  Diverticulosis    EYE SURGERY Left     Left Eye    HERNIA REPAIR  2016    LUMBAR SPINE SURGERY      x3 in 2000. 2010, 2011    REVISION TOTAL KNEE ARTHROPLASTY Right     TOTAL HIP ARTHROPLASTY Right 05/28/2019    Procedure: RIGHT TOTAL HIP ARTHROPLASTY;  Surgeon: Harish Dominguez MD;  Location: [facility] MAIN OR;  Service: Orthopedics    TOTAL KNEE ARTHROPLASTY Right     TOTAL KNEE ARTHROPLASTY Left 04/25/2024    Procedure: TOTAL KNEE ARTHROPLASTY;  Surgeon: Harish Dominguez MD;  Location: [facility] OR [facility];  Service: Orthopedics;  Laterality: Left;       Social History  Social History     Socioeconomic History    Marital status: Single   Tobacco Use    Smoking status: Never     Passive exposure: Never    Smokeless tobacco: Never   Vaping Use    Vaping status: Never Used   Substance and Sexual Activity    Alcohol use: No     Comment: no caffeine     Drug use: No    Sexual activity: Defer       Family History  Family History   Problem Relation Age of Onset    Depression Mother     COPD Mother     Mental illness Mother     Diabetes Father     Hypertension Father     Heart disease Father     Hyperlipidemia Father     Cancer Father     Liver cancer Father     Malig Hyperthermia Neg Hx     Drug abuse Neg Hx     Allergies Neg Hx     Asthma Neg Hx     Stroke Neg Hx        Colorectal cancer family history: None    Review of Systems  Negative except as documented in the HPI.     Allergies  No Known Allergies    Medications    Current Outpatient Medications:     albuterol (PROVENTIL HFA;VENTOLIN HFA) 108 (90 BASE) MCG/ACT inhaler,  Inhale 2 puffs every 4 (four) hours as needed for wheezing., Disp: 1 inhaler, Rfl: 3    allopurinol (ZYLOPRIM) 100 MG tablet, Take 1 tablet by mouth 2 (Two) Times a Day., Disp: , Rfl:     amLODIPine (NORVASC) 10 MG tablet, Take 1 tablet by mouth Daily., Disp: , Rfl: 6    cholecalciferol (VITAMIN D3) 25 MCG (1000 UT) tablet, Take 1 tablet by mouth Daily., Disp: , Rfl:     Cyanocobalamin (VITAMIN B-12 PO), Take  by mouth., Disp: , Rfl:     Enbrel SureClick 50 MG/ML solution auto-injector, Inject 50 mg under the skin into the appropriate area as directed 1 (One) Time Per Week., Disp: , Rfl:     gabapentin (NEURONTIN) 100 MG capsule, Take 1 capsule by mouth 3 (Three) Times a Day., Disp: , Rfl:     hydrochlorothiazide (HYDRODIURIL) 12.5 MG tablet, Take 1 tablet by mouth Daily., Disp: , Rfl: 1    lisinopril (PRINIVIL,ZESTRIL) 40 MG tablet, Take 1 tablet by mouth Daily., Disp: , Rfl: 6    Omega-3 Fatty Acids (fish oil) 1000 MG capsule capsule, Take 1 capsule by mouth Daily With Breakfast. HOLD PER MD INSTR, Disp: , Rfl:     oxyCODONE-acetaminophen (PERCOCET) 5-325 MG per tablet, Take 1-2 tablets by mouth Every 4 (Four) Hours As Needed (pain)., Disp: 42 tablet, Rfl: 0    tamsulosin (FLOMAX) 0.4 MG capsule 24 hr capsule, TAKE 1 CAPSULE EVERY DAY (Patient taking differently: 1 capsule 2 (Two) Times a Day.), Disp: 90 capsule, Rfl: 0    ondansetron (ZOFRAN) 8 MG tablet, Take 1 tablet by mouth 3 (Three) Times a Day As Needed for Nausea or Vomiting. (Patient not taking: Reported on 8/20/2024), Disp: 60 tablet, Rfl: 5    Vital Signs  Vitals:    08/20/24 1108   BP: 144/88        Physical Exam  Constitutional: Resting comfortably, no acute distress  Neck: Supple, trachea midline  Respiratory: No increased work of breathing, Symmetric excursion  Cardiovascular: Well pefursed, no jugular venous distention evident   Abdominal:  Soft, non-tender, non-distended  Lymphatics: No cervical or suprascapular adenopathy  Skin: Warm, dry, no  rash on visualized skin surfaces  Musculoskeletal: Symmetric strength, no obvious gross abnormalities  Psychiatric: Alert and oriented ×3, normal affect     Laboratory Results  I have personally reviewed CBC with WC 10, Humoryl 13, platelet 261.  CMP with creatinine 1.0, albumin 4.0.    Radiology  I have personally reviewed cross-sectional imaging from his PET scan performed on May 31, 2024 for surgical planning of internal jugular venous access.       Lionel Wilhelm MD  Colorectal & General Surgery  [facility]    [address], [address]  [place], [place], [address]  P: [phone]  F: [phone]

## 2024-08-23 ENCOUNTER — ANESTHESIA EVENT (OUTPATIENT)
Age: 64
End: 2024-08-23
Payer: MEDICARE

## 2024-08-23 ENCOUNTER — ANESTHESIA (OUTPATIENT)
Age: 64
End: 2024-08-23
Payer: MEDICARE

## 2024-08-23 ENCOUNTER — HOSPITAL ENCOUNTER (OUTPATIENT)
Age: 64
End: 2024-08-23
Payer: MEDICARE

## 2024-08-23 ENCOUNTER — HOSPITAL ENCOUNTER (OUTPATIENT)
Age: 64
Setting detail: HOSPITAL OUTPATIENT SURGERY
Discharge: HOME | End: 2024-08-23
Attending: SURGERY | Admitting: SURGERY
Payer: MEDICARE

## 2024-08-23 VITALS
TEMPERATURE: 98 F | OXYGEN SATURATION: 98 % | WEIGHT: 212.6 LBS | HEIGHT: 72 IN | DIASTOLIC BLOOD PRESSURE: 78 MMHG | SYSTOLIC BLOOD PRESSURE: 141 MMHG | HEART RATE: 58 BPM | RESPIRATION RATE: 17 BRPM | BODY MASS INDEX: 28.79 KG/M2

## 2024-08-23 DIAGNOSIS — C78.7 MELANOMA OF UVEA METASTATIC TO LIVER: ICD-10-CM

## 2024-08-23 DIAGNOSIS — C43.9 METASTATIC MELANOMA: ICD-10-CM

## 2024-08-23 DIAGNOSIS — C69.40 MELANOMA OF UVEA METASTATIC TO LIVER: ICD-10-CM

## 2024-08-23 PROCEDURE — 25810000003 LACTATED RINGERS PER 1000 ML: Performed by: ANESTHESIOLOGY

## 2024-08-23 PROCEDURE — 25010000002 HEPARIN (PORCINE) PER 1000 UNITS: Performed by: SURGERY

## 2024-08-23 PROCEDURE — 77001 FLUOROGUIDE FOR VEIN DEVICE: CPT | Performed by: SURGERY

## 2024-08-23 PROCEDURE — 36561 INSERT TUNNELED CV CATH: CPT | Performed by: SURGERY

## 2024-08-23 PROCEDURE — 25010000002 CEFAZOLIN PER 500 MG: Performed by: SURGERY

## 2024-08-23 PROCEDURE — C1788 PORT, INDWELLING, IMP: HCPCS | Performed by: SURGERY

## 2024-08-23 PROCEDURE — 76937 US GUIDE VASCULAR ACCESS: CPT | Performed by: SURGERY

## 2024-08-23 PROCEDURE — 25010000002 HYDROMORPHONE 1 MG/ML SOLUTION: Performed by: ANESTHESIOLOGY

## 2024-08-23 PROCEDURE — 25010000002 PROPOFOL 10 MG/ML EMULSION: Performed by: ANESTHESIOLOGY

## 2024-08-23 DEVICE — SMARTPORT PLASTIC LOW PROFILE PORT WITH VORTEX TECHNOLOGY
Type: IMPLANTABLE DEVICE | Site: CHEST | Status: FUNCTIONAL
Brand: BIOFLO VORTEX

## 2024-08-23 RX ORDER — DIAZEPAM 5 MG
TABLET ORAL
COMMUNITY
Start: 2024-08-13

## 2024-08-23 RX ORDER — FENTANYL CITRATE 50 UG/ML
50 INJECTION, SOLUTION INTRAMUSCULAR; INTRAVENOUS
Status: DISCONTINUED | OUTPATIENT
Start: 2024-08-23 | End: 2024-08-23 | Stop reason: HOSPADM

## 2024-08-23 RX ORDER — NALOXONE HCL 0.4 MG/ML
0.2 VIAL (ML) INJECTION AS NEEDED
Status: DISCONTINUED | OUTPATIENT
Start: 2024-08-23 | End: 2024-08-23 | Stop reason: HOSPADM

## 2024-08-23 RX ORDER — FAMOTIDINE 10 MG/ML
20 INJECTION, SOLUTION INTRAVENOUS ONCE
Status: COMPLETED | OUTPATIENT
Start: 2024-08-23 | End: 2024-08-23

## 2024-08-23 RX ORDER — DIPHENHYDRAMINE HYDROCHLORIDE 50 MG/ML
12.5 INJECTION INTRAMUSCULAR; INTRAVENOUS
Status: DISCONTINUED | OUTPATIENT
Start: 2024-08-23 | End: 2024-08-23 | Stop reason: HOSPADM

## 2024-08-23 RX ORDER — FLUMAZENIL 0.1 MG/ML
0.2 INJECTION INTRAVENOUS AS NEEDED
Status: DISCONTINUED | OUTPATIENT
Start: 2024-08-23 | End: 2024-08-23 | Stop reason: HOSPADM

## 2024-08-23 RX ORDER — SODIUM CHLORIDE 0.9 % (FLUSH) 0.9 %
3 SYRINGE (ML) INJECTION EVERY 12 HOURS SCHEDULED
Status: DISCONTINUED | OUTPATIENT
Start: 2024-08-23 | End: 2024-08-23 | Stop reason: HOSPADM

## 2024-08-23 RX ORDER — ACETAMINOPHEN 500 MG
500 TABLET ORAL ONCE
Status: COMPLETED | OUTPATIENT
Start: 2024-08-23 | End: 2024-08-23

## 2024-08-23 RX ORDER — LIDOCAINE HYDROCHLORIDE 20 MG/ML
INJECTION, SOLUTION INFILTRATION; PERINEURAL AS NEEDED
Status: DISCONTINUED | OUTPATIENT
Start: 2024-08-23 | End: 2024-08-23 | Stop reason: SURG

## 2024-08-23 RX ORDER — ONDANSETRON 2 MG/ML
4 INJECTION INTRAMUSCULAR; INTRAVENOUS ONCE AS NEEDED
Status: DISCONTINUED | OUTPATIENT
Start: 2024-08-23 | End: 2024-08-23 | Stop reason: HOSPADM

## 2024-08-23 RX ORDER — DROPERIDOL 2.5 MG/ML
0.62 INJECTION, SOLUTION INTRAMUSCULAR; INTRAVENOUS ONCE AS NEEDED
Status: DISCONTINUED | OUTPATIENT
Start: 2024-08-23 | End: 2024-08-23 | Stop reason: HOSPADM

## 2024-08-23 RX ORDER — SODIUM CHLORIDE, SODIUM LACTATE, POTASSIUM CHLORIDE, CALCIUM CHLORIDE 600; 310; 30; 20 MG/100ML; MG/100ML; MG/100ML; MG/100ML
9 INJECTION, SOLUTION INTRAVENOUS CONTINUOUS
Status: DISCONTINUED | OUTPATIENT
Start: 2024-08-23 | End: 2024-08-23 | Stop reason: HOSPADM

## 2024-08-23 RX ORDER — PROPOFOL 10 MG/ML
VIAL (ML) INTRAVENOUS CONTINUOUS PRN
Status: DISCONTINUED | OUTPATIENT
Start: 2024-08-23 | End: 2024-08-23 | Stop reason: SURG

## 2024-08-23 RX ORDER — MIDAZOLAM HYDROCHLORIDE 1 MG/ML
1 INJECTION INTRAMUSCULAR; INTRAVENOUS
Status: DISCONTINUED | OUTPATIENT
Start: 2024-08-23 | End: 2024-08-23 | Stop reason: HOSPADM

## 2024-08-23 RX ORDER — SODIUM CHLORIDE 0.9 % (FLUSH) 0.9 %
3-10 SYRINGE (ML) INJECTION AS NEEDED
Status: DISCONTINUED | OUTPATIENT
Start: 2024-08-23 | End: 2024-08-23 | Stop reason: HOSPADM

## 2024-08-23 RX ORDER — LABETALOL HYDROCHLORIDE 5 MG/ML
10 INJECTION, SOLUTION INTRAVENOUS
Status: DISCONTINUED | OUTPATIENT
Start: 2024-08-23 | End: 2024-08-23 | Stop reason: HOSPADM

## 2024-08-23 RX ORDER — HYDRALAZINE HYDROCHLORIDE 20 MG/ML
10 INJECTION INTRAMUSCULAR; INTRAVENOUS
Status: DISCONTINUED | OUTPATIENT
Start: 2024-08-23 | End: 2024-08-23 | Stop reason: HOSPADM

## 2024-08-23 RX ORDER — FENTANYL CITRATE 50 UG/ML
25 INJECTION, SOLUTION INTRAMUSCULAR; INTRAVENOUS
Status: DISCONTINUED | OUTPATIENT
Start: 2024-08-23 | End: 2024-08-23 | Stop reason: HOSPADM

## 2024-08-23 RX ORDER — BUPIVACAINE HYDROCHLORIDE AND EPINEPHRINE 5; 5 MG/ML; UG/ML
INJECTION, SOLUTION EPIDURAL; INTRACAUDAL; PERINEURAL AS NEEDED
Status: DISCONTINUED | OUTPATIENT
Start: 2024-08-23 | End: 2024-08-23 | Stop reason: HOSPADM

## 2024-08-23 RX ADMIN — PROPOFOL 30 MG: 10 INJECTION, EMULSION INTRAVENOUS at 09:09

## 2024-08-23 RX ADMIN — LIDOCAINE HYDROCHLORIDE 60 MG: 20 INJECTION, SOLUTION INFILTRATION; PERINEURAL at 08:57

## 2024-08-23 RX ADMIN — ACETAMINOPHEN 500 MG: 500 TABLET ORAL at 08:27

## 2024-08-23 RX ADMIN — SODIUM CHLORIDE 2000 MG: 900 INJECTION INTRAVENOUS at 08:28

## 2024-08-23 RX ADMIN — SODIUM CHLORIDE, POTASSIUM CHLORIDE, SODIUM LACTATE AND CALCIUM CHLORIDE 9 ML/HR: 600; 310; 30; 20 INJECTION, SOLUTION INTRAVENOUS at 08:28

## 2024-08-23 RX ADMIN — HYDROMORPHONE HYDROCHLORIDE 0.5 MG: 1 INJECTION, SOLUTION INTRAMUSCULAR; INTRAVENOUS; SUBCUTANEOUS at 08:55

## 2024-08-23 RX ADMIN — PROPOFOL 100 MCG/KG/MIN: 10 INJECTION, EMULSION INTRAVENOUS at 08:57

## 2024-08-23 RX ADMIN — FAMOTIDINE 20 MG: 10 INJECTION, SOLUTION INTRAVENOUS at 08:27

## 2024-08-23 RX ADMIN — PROPOFOL 50 MG: 10 INJECTION, EMULSION INTRAVENOUS at 08:59

## 2024-08-23 NOTE — OP NOTE
Colorectal & General Surgery  Operative Report    PREOPERATIVE DIAGNOSIS:  Metastatic melanoma    POSTOPERATIVE DIAGNOSIS:  Same    PROCEDURE:  Powerport placement with fluoroscopic guidance  Ultrasound guided access of the right internal jugular vein    DATE OF PROCEDURE:   08/23/24     SURGEON:  Lionel Wilhelm MD    ASSISTANT:  None    ANESTHESIA:  Monitored anesthesia care    EBL:  Minimal    SPECIMEN:  None    DESCRIPTION:  All risks, benefits, and alternatives were explained and informed consent was obtained.  The patient was taken to the operating room under the care of the nursing staff.  The patient was placed on the operating room table in the supine position where anesthesia was induced.  The patient was then prepped and draped in the usual sterile fashion.  Local anesthesic was administered.  The right internal jugular vein was accessed with an 18-gauge needle under ultrasound guiddance, and a guidewire was inserted under fluoroscopic guidance into the superior vena cava.  A subcutaneous pocket was then created with electrocautery on the right chest wall. The port was placed in the pocket and secured in two points with 2-0 PDS. The tubing was then tunneled from the subcutaneous pocket to the location of the wire. The vein dilator and sheath were introduced, and the dilator and guidewire were removed.  The port tubing was inserted to the cavoatrial junction, and position of tip was confirmed with fluoroscopic guidance.  Venous blood was easily aspirated from the port, and the port was flushed with heparinized saline. There was good hemostasis noted. The skin was then closed with 3-0 Vicryl deep dermal and 4-0 monocryl subcuticular sutures. Dermabond was placed over the wound. The patient tolerated the procedure well and was transferred to the recovery area in stable condition. Recovery room CXR was ordered to confirm placement.    Lionel Wilhelm M.D.  Colorectal & General Surgery  [facility]    [address], [address]  [place], [place], [address]  P: [phone]  F: [phone]

## 2024-08-23 NOTE — ANESTHESIA POSTPROCEDURE EVALUATION
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          Post Anesthesia Care and Evaluation    Patient location during evaluation: bedside  Patient participation: complete - patient participated  Level of consciousness: awake  Pain management: adequate    Airway patency: patent  Anesthetic complications: No anesthetic complications    Cardiovascular status: acceptable  Respiratory status: acceptable  Hydration status: acceptable    Comments: */75 (BP Location: Left arm, Patient Position: Sitting)   Pulse 58   Temp 36.7 °C (98 °F) (Temporal)   Resp 14   Ht 182.9 cm (72\")   Wt 96.4 kg (212 lb 9.6 oz)   SpO2 97%   BMI 28.83 kg/m²       "

## 2024-08-23 NOTE — ANESTHESIA PREPROCEDURE EVALUATION
Anesthesia Evaluation     no history of anesthetic complications:   NPO Solid Status: > 8 hours  NPO Liquid Status: > 2 hours           Airway   Mallampati: II  Neck ROM: full  no difficulty expected  Dental    (+) upper dentures and lower dentures    Pulmonary - normal exam   (+) pneumonia , lung cancer, COPD, asthma,shortness of breath, sleep apnea  (-) not a smoker    PE comment: nonlabored  Cardiovascular - normal exam  Exercise tolerance: good (4-7 METS)    Rhythm: regular  Rate: normal    (+) hypertension, CAD, dysrhythmias Atrial Fib, GOLDMAN,  carotid artery disease  (-) valvular problems/murmurs, past MI, angina      Neuro/Psych  (+) syncope, psychiatric history ADHD  (-) seizures, TIA, CVA  GI/Hepatic/Renal/Endo    (+) hiatal hernia, hepatitis C, liver disease (Hep C, metastatic disease of liver), renal disease (LORENA, CKD--stage 3)-  (-) GERD, diabetes, no thyroid disorder    Musculoskeletal     (+) arthralgias, chronic pain, neck pain  Abdominal    Substance History      OB/GYN          Other   arthritis,   history of cancer (Lung cancer with bone mets; melanoma of uvea with liver mets;)                Anesthesia Plan    ASA 3     MAC       Anesthetic plan, risks, benefits, and alternatives have been provided, discussed and informed consent has been obtained with: patient.    CODE STATUS:

## 2024-08-26 ENCOUNTER — TELEMEDICINE (OUTPATIENT)
Dept: ONCOLOGY | Facility: CLINIC | Age: 64
End: 2024-08-26
Payer: MEDICARE

## 2024-08-26 VITALS — WEIGHT: 212 LBS | BODY MASS INDEX: 28.71 KG/M2 | HEIGHT: 72 IN

## 2024-08-26 DIAGNOSIS — C78.7 MELANOMA OF UVEA METASTATIC TO LIVER: Primary | ICD-10-CM

## 2024-08-26 DIAGNOSIS — C69.40 MELANOMA OF UVEA METASTATIC TO LIVER: Primary | ICD-10-CM

## 2024-08-26 PROCEDURE — 1125F AMNT PAIN NOTED PAIN PRSNT: CPT | Performed by: NURSE PRACTITIONER

## 2024-08-26 PROCEDURE — 99214 OFFICE O/P EST MOD 30 MIN: CPT | Performed by: NURSE PRACTITIONER

## 2024-08-26 RX ORDER — PREDNISONE 10 MG/1
10 TABLET ORAL DAILY
Qty: 30 TABLET | Refills: 2 | Status: SHIPPED | OUTPATIENT
Start: 2024-08-26

## 2024-08-26 RX ORDER — LIDOCAINE/PRILOCAINE 2.5 %-2.5%
CREAM (GRAM) TOPICAL
Qty: 30 G | Refills: 2 | Status: SHIPPED | OUTPATIENT
Start: 2024-08-26

## 2024-08-26 NOTE — PROGRESS NOTES
[facility] Hematology/Oncology Treatment Plan Summary    Name: Renzo Streeter  [facility]# [id number]  MD: Dr. Ellis    Diagnosis:     ICD-10-CM ICD-9-CM   1. 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  C69.40 190.0    C78.7 197.7     Stage: IV    Goal of treatment: palliative    Treatment Medication(s):   Keytruda  Zometa    Frequency: Keytruda every 3 weeks; Zometa monthly    Number of cycles: to be determined    Starting on: 8/27/2024    Repeat after 3 cycles: PET Scan    Items for home use: Senokot-S (for constipation), Milk of Magnesia (for constipation), Imodium AD (for diarrhea), Tylenol (for fever and/or pain), and Thermometer    Rx written for: [x] Nausea    [] Pre-Treatment   EMLA cream to port site 30 minutes prior to access    Notes: Zometa will begin with your second dose of Keytruda. This drug is specifically given to help your bones (that have metastatic cancer in them) to help them from breaking.    Completing Provider: ATILIO Miller           Date/time: 08/26/2024      Please note: You will be seen by a provider frequently with your treatment plan. This plan may change depending on many factors, if so, this will be discussed with you by your physician.  Last update 03/2022.

## 2024-08-26 NOTE — PROGRESS NOTES
TREATMENT  PREPARATION    Renzo Streeter  [id number]  1960    Chief Complaint: Treatment preparation and needs assessment    History of present illness:  Renzo Streeter is a 63 y.o. year old male who is here today for treatment preparation and needs assessment.  The patient has been diagnosed with   Encounter Diagnosis   Name Primary?    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 Yes    and is scheduled to begin treatment with:     Oncology History:    Oncology/Hematology History   Malignant neoplasm of left eye   1/21/2016 Initial Diagnosis    Malignant neoplasm of left eye     7/31/2024 -  Chemotherapy    OP SUPPORTIVE Zoledronic Acid Q28D     Malignant neoplasm of left choroid   5/10/2013 Initial Diagnosis    Malignant neoplasm of left choroid     7/31/2024 -  Chemotherapy    OP SUPPORTIVE Zoledronic Acid Q28D     Cancer, metastatic to liver   7/25/2024 Initial Diagnosis    Cancer, metastatic to liver     7/31/2024 -  Chemotherapy    OP SUPPORTIVE Zoledronic Acid Q28D     Secondary cancer of bone   7/25/2024 Initial Diagnosis    Secondary cancer of bone     7/31/2024 -  Chemotherapy    OP SUPPORTIVE Zoledronic Acid Q28D     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   8/14/2024 Initial Diagnosis    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     8/27/2024 -  Chemotherapy    OP MELANOMA Pembrolizumab 200 mg          The current medication list and allergy list were reviewed and reconciled.     Past Medical History, Past Surgical History, Social History, Family History have been reviewed and are without significant changes except as mentioned.    Physical Exam:    There were no vitals filed for this visit.  Vitals:    08/26/24 1409   PainSc:   6   PainLoc: Generalized        ECOG score: 0             Physical Exam  HENT:      Head: Normocephalic and atraumatic.   Eyes:      Extraocular Movements: Extraocular movements intact.      Conjunctiva/sclera: Conjunctivae normal.   Pulmonary:      Effort: Pulmonary effort is normal.  No respiratory distress.   Neurological:      General: No focal deficit present.      Mental Status: He is alert and oriented to person, place, and time.   Psychiatric:         Mood and Affect: Mood normal.         Behavior: Behavior normal.           NEEDS ASSESSMENTS    Genetics  The patient's new diagnosis and family history have been reviewed for genetic counseling needs. The patient will not be referred..     Psychosocial and Barriers to care  The patient has completed a PHQ-9 Depression Screening and the Distress Thermometer (DT) today.  PHQ-9 results show PHQ-2 Total Score: 0 PHQ-9 Total Score: PHQ-9 Total Score: 0     The patient scored their distress today as Distress Level: 3 on a scale of 0-10 with 0 being no distress and 10 being extreme distress. Problems marked by the patient as being an issue for them within the last week include Physical concerns  Pain: Yes .      Results were reviewed along with psychosocial resources offered by our cancer center.  Our Supportive Oncology team will be flagged for a score of 4 or above, and a same day call will be made for a score of 9 or 10.  A mental health referral is offered at that time. Patients who score less than 4 have been educated on our support services and can be referred to our  upon request.  The patient will not be referred to our .       Nutrition  The patient has completed the malnutrition screening today. They scored Malnutrition Screening Tool  Have you recently lost weight without trying?  If yes, how much weight have you lost?: 0--> No  Have you been eating poorly because of a decreased appetite?: 0--> No  MST score: 0   with a score of 0-1 meaning not at risk in a score of 2 or greater meaning at risk.  Patients with a score of 3 or higher will be referred to our oncology dietitian for support. Patients beginning at risk treatment regimens or who have dietary concerns will also be referred to our oncology dietitian.  The patient will not be referred.    Functional Assessment  Persons who are age 70 or greater will be screened for qualification of a comprehensive geriatric assessment by our survivorship nurse practitioner.  Older adults with cancer face unique challenges. These may include an increased risk of drug reactions, financial burdens, and caregiver stress. The patient scored   . Patients scoring 14 or lower will referred for an older adult functional assessment with the survivorship advanced practice registered nurse to ensure all needed support is provided as patients plan for their treatments. NOT APPLICABLE    Intravenous Access Assessment  The patient and I discussed planned intravenous chemo/biotherapy as well as other IV treatments that are often needed throughout the course of treatment. These may include, but are not limited to blood transfusions, antibiotics, and IV hydration. Discussed that depending on selected treatment and vein assessment, patient may require venous access device (VAD) which could include but not limited to a Mediport or PICC line. Risks and benefits of VADs reviewed. The patient will be treated via Port.    Reproductive/Sexual Activity   People should avoid becoming pregnant and should not get a partner pregnant while undergoing chemo/biotherapy.  People of childbearing age should use effective contraception during active therapy. The best recommendation for all people is to use a barrier method for a minimum of 1 week after the last infusion of chemo/biotherapy to prevent your partner being exposed to byproducts from treatment medications in bodily fluids. Effective contraception should be discussed with your oncology team to make sure it is safe to take based on your diagnosis. Possible options include oral contraceptives, barrier methods. Chemo/biotherapy can change your ability to reproduce children in the future.  There are options for fertility preservation. NOT  "APPLICABLE    Advanced Care Planning  Advance Care Planning   The patient and I discussed advanced care planning, \"Conversations that Matter\".   This service is offered for development of advance directives with a certified ACP facilitator.  The patient does not have an up-to-date advanced directive. This document is not on file with our office. The patient is not interested in an appointment with one of our facilitators to create or update their advanced directives.    Have you reviewed your Advance Directive and is it valid for this stay?: Not applicable          Smoking cessation  Tobacco Use: Low Risk  (8/23/2024)    Patient History     Smoking Tobacco Use: Never     Smokeless Tobacco Use: Never     Passive Exposure: Never       Patient and I discussed their tobacco use history. Referral will not be made for smoking cessation.      Palliative Care  When appropriate, the patient and I discussed the availability palliative care services and when appropriate Hospice care. Palliative care is not the same as Hospice care which was explained to the patient.The patient is not interested in additional information from our  on these services.    Survivorship   When appropriate, we discussed that we will refer the patient to survivorship clinic to discuss next steps following completion of planned treatment.  Reviewed this visit will include assessment of your physical, psychological, functional, and spiritual needs as a survivor and the need at attend this visit when scheduled.    TREATMENT EDUCATION    Today I met with the patient to discuss the chemo/biotherapy regimen recommended for treatment of 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  .  The patient was given explanation of treatment premed side effects including office policy that prohibits patients to drive if sedating medications are administered, MD explanation given regarding benefits, side effects, toxicities and goals of treatment.  The patient " received a Chemotherapy/Biotherapy Plan Summary including diagnosis and explanation of specific treatment plan.    SIDE EFFECTS:  Common side effects were discussed with the patient and/or significant other.  Discussion included where applicable hair loss/discoloration, anemia/fatigue, infection/chills/fever, appetite, bleeding risk/precautions, constipation, diarrhea, mouth sores, taste alteration, loss of appetite, nausea/vomiting, peripheral neuropathy, skin/nail changes, rash, muscle aches/weakness, photosensitivity, weight gain/loss, hearing loss, dizziness, menopausal symptoms, menstrual irregularity, sterility, high blood pressure, heart damage, liver damage, lung damage, kidney damage, DVT/PE risk, fluid retention, pleural/pericardial effusion, somnolence, electrolyte/LFT imbalance, vein exercises and/or the possible need for vascular access/port placement.  The patient was advised that although uncommon, leakage of an infused medication from the vein or venous access device may lead to skin breakdown and/or other tissue damage.  The patient was advised that he/she may have pain, bleeding, and/or bruising from the insertion of a needle in their vein or venous access device (port).  The patient was further advised that, in spite of proper technique, infection with redness and irritation may rarely occur at the site where the needle was inserted.  The patient was advised that if complications occur, additional medical treatment is available.  Finally, where applicable we have reviewed rare but potential immune mediated side effects including shortness of breath, cough, chest pain (pneumonitis), abdominal pain, diarrhea (colitis), thyroiditis (hypothyroid or hyperthyroid), hepatitis and liver dysfunction, nephritis and renal dysfunction.    Discussion also included side effects specific to drugs in the treatment plan, specifically:    Treatment Plans       Name Type Hold Status Plan Dates Plan Provider        Active    OP SUPPORTIVE Zoledronic Acid Q28D ONCOLOGY SUPPORTIVE CARE 1 On Automatic Hold  7/31/2024 - Present Jasmine Ellis MD PhD    OP MELANOMA Pembrolizumab 200 mg  ONCOLOGY TREATMENT Not on Hold  8/14/2024 - Present Jasmine Ellis MD PhD                     Questions answered and additional information discussed on topics including:  Constipation, Diarrhea, Skin & nail changes, Organ toxicities, Home care, and          Assessment and Plan:    Diagnoses and all orders for this visit:    1. 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 (Primary)    Other orders  -     predniSONE (DELTASONE) 10 MG tablet; Take 1 tablet by mouth Daily.  Dispense: 30 tablet; Refill: 2  -     lidocaine-prilocaine (EMLA) 2.5-2.5 % cream; Apply nickel-sized amount over Mediport site 30 min prior to access. Do not rub in.  Dispense: 30 g; Refill: 2      No orders of the defined types were placed in this encounter.        The patient and I have reviewed their diagnosis and scheduled treatment plan. Needs assessment was completed where applicable including genetics, psychosocial needs, barriers to care, VAD evaluation, advanced care planning, survivorship, and palliative care services where indicated. Referrals have been ordered as appropriate based upon evaluation today and patient desires.   Chemo/biotherapy teaching was completed today and consent obtained. See separate documentation for further details.  Adequate time was given to answer questions.  Patient made aware of their care team members and contact information if they have questions or problems throughout the treatment course.  Discussion held and written information provided describing frequency of office visits and ongoing monitoring throughout the treatment plan.     Reviewed with patient any prescribed medication sent to pharmacy.  Education provided regarding proper storage, safe handling, and proper disposal of unused medication.  Proper handling of body fluids and waste  discussed and written information provided.  If appropriate, patient had pretreatment labs drawn today.    Learning assessment completed at initial patient encounter. See separate flowsheet. Chemo/biotherapy education comprehension assessed at today's visit.    Patient has severe underlying rheumatoid arthritis with chronic pain at baseline.  He previously discussed being on prednisone 10 mg daily with Dr. Ellis.  Agree this is prudent to continue for now in light of concern for worsening inflammation on Keytruda.  We did discuss higher doses of steroids could interfere with the effectiveness of Keytruda but low dose should be okay.    I spent 35 minutes caring for Renzo on this date of service. This time includes time spent by me in the following activities: preparing for the visit, reviewing tests, obtaining and/or reviewing a separately obtained history, counseling and educating the patient/family/caregiver, documenting information in the medical record, and care coordination.     Roxana Mcdowell, APRN   08/26/24      Mode of Visit: Video  Location of patient: other: Home  Location of provider: [facility]  You have agreed to receive care through a telehealth visit.  Does the patient consent to use a video/audio connection for your medical care today? Yes  The visit included audio and video interaction. No technical issues occurred during this visit.

## 2024-08-27 ENCOUNTER — INFUSION (OUTPATIENT)
Dept: ONCOLOGY | Facility: HOSPITAL | Age: 64
End: 2024-08-27
Payer: MEDICARE

## 2024-08-27 VITALS
RESPIRATION RATE: 18 BRPM | TEMPERATURE: 97.7 F | BODY MASS INDEX: 29.11 KG/M2 | SYSTOLIC BLOOD PRESSURE: 142 MMHG | HEART RATE: 61 BPM | WEIGHT: 212.6 LBS | OXYGEN SATURATION: 96 % | DIASTOLIC BLOOD PRESSURE: 78 MMHG

## 2024-08-27 DIAGNOSIS — Z79.899 ENCOUNTER FOR LONG-TERM (CURRENT) USE OF HIGH-RISK MEDICATION: ICD-10-CM

## 2024-08-27 DIAGNOSIS — Z45.2 FITTING AND ADJUSTMENT OF VASCULAR CATHETER: ICD-10-CM

## 2024-08-27 DIAGNOSIS — C69.40 MELANOMA OF UVEA METASTATIC TO LIVER: Primary | ICD-10-CM

## 2024-08-27 DIAGNOSIS — C78.7 MELANOMA OF UVEA METASTATIC TO LIVER: Primary | ICD-10-CM

## 2024-08-27 LAB
ALBUMIN SERPL-MCNC: 3.8 G/DL (ref 3.5–5.2)
ALBUMIN/GLOB SERPL: 1.5 G/DL
ALP SERPL-CCNC: 70 U/L (ref 39–117)
ALT SERPL W P-5'-P-CCNC: 26 U/L (ref 1–41)
ANION GAP SERPL CALCULATED.3IONS-SCNC: 12 MMOL/L (ref 5–15)
AST SERPL-CCNC: 24 U/L (ref 1–40)
BASOPHILS # BLD AUTO: 0.13 10*3/MM3 (ref 0–0.2)
BASOPHILS NFR BLD AUTO: 1.2 % (ref 0–1.5)
BILIRUB SERPL-MCNC: 0.2 MG/DL (ref 0–1.2)
BUN SERPL-MCNC: 34 MG/DL (ref 8–23)
BUN/CREAT SERPL: 33.3 (ref 7–25)
CALCIUM SPEC-SCNC: 8.3 MG/DL (ref 8.6–10.5)
CHLORIDE SERPL-SCNC: 106 MMOL/L (ref 98–107)
CO2 SERPL-SCNC: 22 MMOL/L (ref 22–29)
CREAT SERPL-MCNC: 1.02 MG/DL (ref 0.76–1.27)
DEPRECATED RDW RBC AUTO: 51.5 FL (ref 37–54)
EGFRCR SERPLBLD CKD-EPI 2021: 82.1 ML/MIN/1.73
EOSINOPHIL # BLD AUTO: 0.91 10*3/MM3 (ref 0–0.4)
EOSINOPHIL NFR BLD AUTO: 8.4 % (ref 0.3–6.2)
ERYTHROCYTE [DISTWIDTH] IN BLOOD BY AUTOMATED COUNT: 15.5 % (ref 12.3–15.4)
GLOBULIN UR ELPH-MCNC: 2.5 GM/DL
GLUCOSE SERPL-MCNC: 123 MG/DL (ref 65–99)
HCT VFR BLD AUTO: 42.8 % (ref 37.5–51)
HGB BLD-MCNC: 13.2 G/DL (ref 13–17.7)
IMM GRANULOCYTES # BLD AUTO: 0.04 10*3/MM3 (ref 0–0.05)
IMM GRANULOCYTES NFR BLD AUTO: 0.4 % (ref 0–0.5)
LYMPHOCYTES # BLD AUTO: 2.77 10*3/MM3 (ref 0.7–3.1)
LYMPHOCYTES NFR BLD AUTO: 25.6 % (ref 19.6–45.3)
MCH RBC QN AUTO: 28.1 PG (ref 26.6–33)
MCHC RBC AUTO-ENTMCNC: 30.8 G/DL (ref 31.5–35.7)
MCV RBC AUTO: 91.3 FL (ref 79–97)
MONOCYTES # BLD AUTO: 1.26 10*3/MM3 (ref 0.1–0.9)
MONOCYTES NFR BLD AUTO: 11.6 % (ref 5–12)
NEUTROPHILS NFR BLD AUTO: 5.71 10*3/MM3 (ref 1.7–7)
NEUTROPHILS NFR BLD AUTO: 52.8 % (ref 42.7–76)
NRBC BLD AUTO-RTO: 0 /100 WBC (ref 0–0.2)
PLATELET # BLD AUTO: 252 10*3/MM3 (ref 140–450)
PMV BLD AUTO: 9.2 FL (ref 6–12)
POTASSIUM SERPL-SCNC: 3.8 MMOL/L (ref 3.5–5.2)
PROT SERPL-MCNC: 6.3 G/DL (ref 6–8.5)
RBC # BLD AUTO: 4.69 10*6/MM3 (ref 4.14–5.8)
SODIUM SERPL-SCNC: 140 MMOL/L (ref 136–145)
T4 FREE SERPL-MCNC: 1.12 NG/DL (ref 0.92–1.68)
TSH SERPL DL<=0.05 MIU/L-ACNC: 2.01 UIU/ML (ref 0.27–4.2)
WBC NRBC COR # BLD AUTO: 10.82 10*3/MM3 (ref 3.4–10.8)

## 2024-08-27 PROCEDURE — 96413 CHEMO IV INFUSION 1 HR: CPT

## 2024-08-27 PROCEDURE — 25010000002 HEPARIN LOCK FLUSH PER 10 UNITS: Performed by: INTERNAL MEDICINE

## 2024-08-27 PROCEDURE — 80053 COMPREHEN METABOLIC PANEL: CPT

## 2024-08-27 PROCEDURE — 25010000002 PEMBROLIZUMAB 100 MG/4ML SOLUTION 4 ML VIAL: Performed by: INTERNAL MEDICINE

## 2024-08-27 PROCEDURE — 85025 COMPLETE CBC W/AUTO DIFF WBC: CPT

## 2024-08-27 PROCEDURE — 25810000003 SODIUM CHLORIDE 0.9 % SOLUTION: Performed by: INTERNAL MEDICINE

## 2024-08-27 PROCEDURE — 84439 ASSAY OF FREE THYROXINE: CPT | Performed by: INTERNAL MEDICINE

## 2024-08-27 PROCEDURE — 84443 ASSAY THYROID STIM HORMONE: CPT | Performed by: INTERNAL MEDICINE

## 2024-08-27 RX ORDER — HEPARIN SODIUM (PORCINE) LOCK FLUSH IV SOLN 100 UNIT/ML 100 UNIT/ML
500 SOLUTION INTRAVENOUS AS NEEDED
Status: DISCONTINUED | OUTPATIENT
Start: 2024-08-27 | End: 2024-08-27 | Stop reason: HOSPADM

## 2024-08-27 RX ORDER — SODIUM CHLORIDE 0.9 % (FLUSH) 0.9 %
10 SYRINGE (ML) INJECTION AS NEEDED
Status: DISCONTINUED | OUTPATIENT
Start: 2024-08-27 | End: 2024-08-27 | Stop reason: HOSPADM

## 2024-08-27 RX ORDER — SODIUM CHLORIDE 9 MG/ML
20 INJECTION, SOLUTION INTRAVENOUS ONCE
Status: COMPLETED | OUTPATIENT
Start: 2024-08-27 | End: 2024-08-27

## 2024-08-27 RX ORDER — SODIUM CHLORIDE 0.9 % (FLUSH) 0.9 %
10 SYRINGE (ML) INJECTION AS NEEDED
OUTPATIENT
Start: 2024-08-27

## 2024-08-27 RX ORDER — HEPARIN SODIUM (PORCINE) LOCK FLUSH IV SOLN 100 UNIT/ML 100 UNIT/ML
500 SOLUTION INTRAVENOUS AS NEEDED
OUTPATIENT
Start: 2024-08-27

## 2024-08-27 RX ADMIN — SODIUM CHLORIDE 20 ML/HR: 9 INJECTION, SOLUTION INTRAVENOUS at 15:01

## 2024-08-27 RX ADMIN — Medication 500 UNITS: at 16:06

## 2024-08-27 RX ADMIN — SODIUM CHLORIDE 200 MG: 9 INJECTION, SOLUTION INTRAVENOUS at 15:02

## 2024-08-27 RX ADMIN — Medication 10 ML: at 16:07

## 2024-09-10 ENCOUNTER — PATIENT OUTREACH (OUTPATIENT)
Dept: OTHER | Facility: HOSPITAL | Age: 64
End: 2024-09-10
Payer: MEDICARE

## 2024-09-10 NOTE — PROGRESS NOTES
Reviewed chart. Patient with metastatic melanoma. Teaching for Zometa and Keytruda 8/26/24, first infusion 8/27; due again 9/17    Called patient. Left message that I was just touching base to see how he was doing with treatment. Wanted to know if he had any questions/concerns or resource/supportive care needs; requested cb    Call from patient. He states he is doing pretty well.  He is eating ok although lost 3 pounds although said he was trying to lose weight.  We discussed not trying to lose weight during treatment. Patient verbalized understanding.     The patient denies any questions/concerns or ongoing resource needs. I will continue to follow; encouraged patient to call as needed.

## 2024-09-17 ENCOUNTER — OFFICE VISIT (OUTPATIENT)
Dept: ONCOLOGY | Facility: CLINIC | Age: 64
End: 2024-09-17
Payer: MEDICARE

## 2024-09-17 ENCOUNTER — INFUSION (OUTPATIENT)
Dept: ONCOLOGY | Facility: HOSPITAL | Age: 64
End: 2024-09-17
Payer: MEDICARE

## 2024-09-17 VITALS
DIASTOLIC BLOOD PRESSURE: 85 MMHG | RESPIRATION RATE: 20 BRPM | HEIGHT: 72 IN | BODY MASS INDEX: 29.26 KG/M2 | WEIGHT: 216 LBS | HEART RATE: 55 BPM | OXYGEN SATURATION: 97 % | SYSTOLIC BLOOD PRESSURE: 163 MMHG

## 2024-09-17 DIAGNOSIS — C69.40 MELANOMA OF UVEA METASTATIC TO LIVER: ICD-10-CM

## 2024-09-17 DIAGNOSIS — C69.40 MELANOMA OF UVEA METASTATIC TO LIVER: Primary | ICD-10-CM

## 2024-09-17 DIAGNOSIS — C69.32 MALIGNANT NEOPLASM OF LEFT CHOROID: ICD-10-CM

## 2024-09-17 DIAGNOSIS — C78.7 MELANOMA OF UVEA METASTATIC TO LIVER: ICD-10-CM

## 2024-09-17 DIAGNOSIS — Z79.899 ENCOUNTER FOR LONG-TERM (CURRENT) USE OF HIGH-RISK MEDICATION: ICD-10-CM

## 2024-09-17 DIAGNOSIS — D72.823 LEUKEMOID REACTION: ICD-10-CM

## 2024-09-17 DIAGNOSIS — C78.7 CANCER, METASTATIC TO LIVER: ICD-10-CM

## 2024-09-17 DIAGNOSIS — C69.92 MALIGNANT NEOPLASM OF LEFT EYE: ICD-10-CM

## 2024-09-17 DIAGNOSIS — C43.9 METASTATIC MALIGNANT MELANOMA: ICD-10-CM

## 2024-09-17 DIAGNOSIS — C79.51 SECONDARY CANCER OF BONE: ICD-10-CM

## 2024-09-17 DIAGNOSIS — G89.3 CANCER RELATED PAIN: ICD-10-CM

## 2024-09-17 DIAGNOSIS — Z45.2 FITTING AND ADJUSTMENT OF VASCULAR CATHETER: ICD-10-CM

## 2024-09-17 DIAGNOSIS — C78.7 MELANOMA OF UVEA METASTATIC TO LIVER: Primary | ICD-10-CM

## 2024-09-17 LAB
ALBUMIN SERPL-MCNC: 3.9 G/DL (ref 3.5–5.2)
ALBUMIN/GLOB SERPL: 1.4 G/DL
ALP SERPL-CCNC: 75 U/L (ref 39–117)
ALT SERPL W P-5'-P-CCNC: 29 U/L (ref 1–41)
ANION GAP SERPL CALCULATED.3IONS-SCNC: 11.5 MMOL/L (ref 5–15)
AST SERPL-CCNC: 23 U/L (ref 1–40)
BASOPHILS # BLD AUTO: 0.12 10*3/MM3 (ref 0–0.2)
BASOPHILS NFR BLD AUTO: 1.1 % (ref 0–1.5)
BILIRUB SERPL-MCNC: 0.2 MG/DL (ref 0–1.2)
BUN SERPL-MCNC: 21 MG/DL (ref 8–23)
BUN/CREAT SERPL: 19.4 (ref 7–25)
CALCIUM SPEC-SCNC: 9.1 MG/DL (ref 8.6–10.5)
CHLORIDE SERPL-SCNC: 107 MMOL/L (ref 98–107)
CO2 SERPL-SCNC: 22.5 MMOL/L (ref 22–29)
CREAT SERPL-MCNC: 1.08 MG/DL (ref 0.76–1.27)
DEPRECATED RDW RBC AUTO: 55.1 FL (ref 37–54)
EGFRCR SERPLBLD CKD-EPI 2021: 76.6 ML/MIN/1.73
EOSINOPHIL # BLD AUTO: 0.82 10*3/MM3 (ref 0–0.4)
EOSINOPHIL NFR BLD AUTO: 7.4 % (ref 0.3–6.2)
ERYTHROCYTE [DISTWIDTH] IN BLOOD BY AUTOMATED COUNT: 16.1 % (ref 12.3–15.4)
GLOBULIN UR ELPH-MCNC: 2.8 GM/DL
GLUCOSE SERPL-MCNC: 96 MG/DL (ref 65–99)
HCT VFR BLD AUTO: 44.9 % (ref 37.5–51)
HGB BLD-MCNC: 14.2 G/DL (ref 13–17.7)
IMM GRANULOCYTES # BLD AUTO: 0.04 10*3/MM3 (ref 0–0.05)
IMM GRANULOCYTES NFR BLD AUTO: 0.4 % (ref 0–0.5)
LYMPHOCYTES # BLD AUTO: 3.44 10*3/MM3 (ref 0.7–3.1)
LYMPHOCYTES NFR BLD AUTO: 31.1 % (ref 19.6–45.3)
MAGNESIUM SERPL-MCNC: 2 MG/DL (ref 1.6–2.4)
MCH RBC QN AUTO: 29.4 PG (ref 26.6–33)
MCHC RBC AUTO-ENTMCNC: 31.6 G/DL (ref 31.5–35.7)
MCV RBC AUTO: 93 FL (ref 79–97)
MONOCYTES # BLD AUTO: 0.99 10*3/MM3 (ref 0.1–0.9)
MONOCYTES NFR BLD AUTO: 8.9 % (ref 5–12)
NEUTROPHILS NFR BLD AUTO: 5.66 10*3/MM3 (ref 1.7–7)
NEUTROPHILS NFR BLD AUTO: 51.1 % (ref 42.7–76)
NRBC BLD AUTO-RTO: 0 /100 WBC (ref 0–0.2)
PHOSPHATE SERPL-MCNC: 3.6 MG/DL (ref 2.5–4.5)
PLATELET # BLD AUTO: 243 10*3/MM3 (ref 140–450)
PMV BLD AUTO: 9.5 FL (ref 6–12)
POTASSIUM SERPL-SCNC: 4 MMOL/L (ref 3.5–5.2)
PROT SERPL-MCNC: 6.7 G/DL (ref 6–8.5)
RBC # BLD AUTO: 4.83 10*6/MM3 (ref 4.14–5.8)
SODIUM SERPL-SCNC: 141 MMOL/L (ref 136–145)
WBC NRBC COR # BLD AUTO: 11.07 10*3/MM3 (ref 3.4–10.8)

## 2024-09-17 PROCEDURE — 25010000002 ZOLEDRONIC ACID 4 MG/100ML SOLUTION: Performed by: INTERNAL MEDICINE

## 2024-09-17 PROCEDURE — 83735 ASSAY OF MAGNESIUM: CPT

## 2024-09-17 PROCEDURE — 84100 ASSAY OF PHOSPHORUS: CPT

## 2024-09-17 PROCEDURE — 96413 CHEMO IV INFUSION 1 HR: CPT

## 2024-09-17 PROCEDURE — 80053 COMPREHEN METABOLIC PANEL: CPT

## 2024-09-17 PROCEDURE — 96375 TX/PRO/DX INJ NEW DRUG ADDON: CPT

## 2024-09-17 PROCEDURE — 25010000002 HEPARIN LOCK FLUSH PER 10 UNITS: Performed by: INTERNAL MEDICINE

## 2024-09-17 PROCEDURE — 85025 COMPLETE CBC W/AUTO DIFF WBC: CPT

## 2024-09-17 PROCEDURE — 25810000003 SODIUM CHLORIDE 0.9 % SOLUTION: Performed by: INTERNAL MEDICINE

## 2024-09-17 PROCEDURE — 25010000002 PEMBROLIZUMAB 100 MG/4ML SOLUTION 4 ML VIAL: Performed by: INTERNAL MEDICINE

## 2024-09-17 RX ORDER — ERGOCALCIFEROL 1.25 MG/1
50000 CAPSULE ORAL WEEKLY
COMMUNITY
Start: 2024-09-05 | End: 2024-11-28

## 2024-09-17 RX ORDER — SODIUM CHLORIDE 9 MG/ML
20 INJECTION, SOLUTION INTRAVENOUS ONCE
Status: COMPLETED | OUTPATIENT
Start: 2024-09-17 | End: 2024-09-17

## 2024-09-17 RX ORDER — HYDROXYZINE HYDROCHLORIDE 25 MG/1
25 TABLET, FILM COATED ORAL 3 TIMES DAILY PRN
Qty: 90 TABLET | Refills: 2 | Status: SHIPPED | OUTPATIENT
Start: 2024-09-17

## 2024-09-17 RX ORDER — SODIUM CHLORIDE 9 MG/ML
20 INJECTION, SOLUTION INTRAVENOUS ONCE
Status: CANCELLED | OUTPATIENT
Start: 2024-09-17

## 2024-09-17 RX ORDER — SODIUM CHLORIDE 0.9 % (FLUSH) 0.9 %
10 SYRINGE (ML) INJECTION AS NEEDED
OUTPATIENT
Start: 2024-09-17

## 2024-09-17 RX ORDER — SODIUM CHLORIDE 9 MG/ML
20 INJECTION, SOLUTION INTRAVENOUS ONCE
Status: DISCONTINUED | OUTPATIENT
Start: 2024-09-17 | End: 2024-09-17 | Stop reason: HOSPADM

## 2024-09-17 RX ORDER — HEPARIN SODIUM (PORCINE) LOCK FLUSH IV SOLN 100 UNIT/ML 100 UNIT/ML
500 SOLUTION INTRAVENOUS AS NEEDED
OUTPATIENT
Start: 2024-09-17

## 2024-09-17 RX ORDER — HEPARIN SODIUM (PORCINE) LOCK FLUSH IV SOLN 100 UNIT/ML 100 UNIT/ML
500 SOLUTION INTRAVENOUS AS NEEDED
Status: DISCONTINUED | OUTPATIENT
Start: 2024-09-17 | End: 2024-09-17 | Stop reason: HOSPADM

## 2024-09-17 RX ORDER — ONDANSETRON 8 MG/1
8 TABLET, FILM COATED ORAL
COMMUNITY
Start: 2024-08-14

## 2024-09-17 RX ORDER — SODIUM CHLORIDE 0.9 % (FLUSH) 0.9 %
10 SYRINGE (ML) INJECTION AS NEEDED
Status: DISCONTINUED | OUTPATIENT
Start: 2024-09-17 | End: 2024-09-17 | Stop reason: HOSPADM

## 2024-09-17 RX ORDER — ZOLEDRONIC ACID 0.04 MG/ML
4 INJECTION, SOLUTION INTRAVENOUS ONCE
Status: CANCELLED | OUTPATIENT
Start: 2024-09-17

## 2024-09-17 RX ORDER — ZOLEDRONIC ACID 0.04 MG/ML
4 INJECTION, SOLUTION INTRAVENOUS ONCE
Status: COMPLETED | OUTPATIENT
Start: 2024-09-17 | End: 2024-09-17

## 2024-09-17 RX ORDER — HYDROCODONE BITARTRATE AND ACETAMINOPHEN 5; 325 MG/1; MG/1
1 TABLET ORAL EVERY 8 HOURS PRN
Qty: 60 TABLET | Refills: 0 | Status: SHIPPED | OUTPATIENT
Start: 2024-09-17

## 2024-09-17 RX ADMIN — Medication 500 UNITS: at 10:43

## 2024-09-17 RX ADMIN — Medication 10 ML: at 10:43

## 2024-09-17 RX ADMIN — SODIUM CHLORIDE 200 MG: 9 INJECTION, SOLUTION INTRAVENOUS at 10:15

## 2024-09-17 RX ADMIN — SODIUM CHLORIDE 20 ML/HR: 9 INJECTION, SOLUTION INTRAVENOUS at 09:55

## 2024-09-17 RX ADMIN — ZOLEDRONIC ACID 4 MG: 0.04 INJECTION, SOLUTION INTRAVENOUS at 09:58

## 2024-09-29 PROBLEM — D72.820 LYMPHOCYTOSIS: Status: ACTIVE | Noted: 2024-09-29

## 2024-09-29 PROBLEM — D72.823 LEUKEMOID REACTION: Status: ACTIVE | Noted: 2024-09-29

## 2024-10-07 NOTE — PROGRESS NOTES
"REASON FOR FOLLOW-UP:     Provide an opinion on any further workup or treatment of metastatic melanoma.                               HISTORY OF PRESENT ILLNESS:  The patient is a 63 y.o. year old male who is here for re-evaluation with the above-mentioned history.  He presents today for Cycle #3 Keytruda. Tolerating well. Denies joint pains, nausea/vomiting/diarrhea, skin rashes, fatigue, or shortness of breath.He states he feels better today than he has in years.     Past Medical History:   Diagnosis Date    Acute renal failure (ARF) 09/2018    RESOLVED AFTER DEHYDRATION RESOLVED    Anemia     Arthritis     OA AND RA    Asthma     Atrial fibrillation with rapid ventricular response     Bone cancer     BPH (benign prostatic hyperplasia)     Chronic back pain     CKD (chronic kidney disease)     Stage 3    COPD (chronic obstructive pulmonary disease)     Coronary artery calcification seen on CAT scan     Eye cancer, left     TREATMENT WITH RADIATION. SOME VISON LOSS LEFT EYE    Gout     Hepatitis C     IN REMISSION. BEEN LONGER THAN 5 YRS    Hiatal hernia     History of atrial fibrillation     SEPT 2018. WITH SEVERE DEHYDRATION    History of blood transfusion 1978    in [place], no known reaction mild shortness of breath    Hypertension     Liver cancer     Lung cancer     MRSA infection     HX OF    Pneumonia     Psoriasis     Right hip pain     Short of breath on exertion     Sleep apnea     \"MILD\". DID NOT FOLLOW THUR GETTING MACHINE RELATED TO COST OF MACHINE    Syncope 09/2018    secondary to atrial fibrillation with rapid ventricular response. SEVERELY DEHYDRATED AT THIS TIME.    Viral hepatitis     Vision loss of left eye     EYE CANCER    Vitamin D deficiency      Past Surgical History:   Procedure Laterality Date    BACK SURGERY      LUMBAR DISC X2. NO HARDWARE    CARPAL TUNNEL RELEASE WITH CUBITAL TUNNEL RELEASE Right     COLONOSCOPY      COLONOSCOPY N/A 07/30/2021    Procedure: COLONOSCOPY;  Surgeon: " Flor Maciel MD;  [place] [facility] [place];  Service: Gastroenterology;  Laterality: N/A;  Diverticulosis    EYE SURGERY Left     Left Eye    HERNIA REPAIR  2016    LUMBAR SPINE SURGERY      x3 in 2000. 2010, 2011    REVISION TOTAL KNEE ARTHROPLASTY Right     TOTAL HIP ARTHROPLASTY Right 05/28/2019    Procedure: RIGHT TOTAL HIP ARTHROPLASTY;  Surgeon: Harish Dominguez MD;  Location: [facility] MAIN OR;  Service: Orthopedics    TOTAL KNEE ARTHROPLASTY Right     TOTAL KNEE ARTHROPLASTY Left 04/25/2024    Procedure: TOTAL KNEE ARTHROPLASTY;  Surgeon: Harish Dominguez MD;  Location: [facility] OR [facility];  Service: Orthopedics;  Laterality: Left;    VENOUS ACCESS DEVICE (PORT) INSERTION Right 8/23/2024    Procedure: INSERTION VENOUS ACCESS DEVICE;  Surgeon: Sal Wilhelm MD;  Location: [facility] MAIN OR;  Service: General;  Laterality: Right;         ONCOLOGY HISTORY:  The patient is a 63 y.o. year old male  who is here for initial evaluation on 7/25/2024 for evaluation of newly diagnosed metastatic melanoma.  This patient has end-stage arthritis required bilateral knee replacement, history of left eye melanoma, lumbar stenosis status post discectomy, hepatitis C, hypertension, and psoriasis.    His primary doctor found lung issues first.  Patient reports he was congested with a cough and dyspnea for about a month.  Patient went to see his primary care physician Dr. Joshua Blake on 4/17/2024 who prescribed doxycycline and had a chest ray examination which reported multiple pulmonary nodules.  He subsequently had a chest CT on 5/152024 which reported a 1.7 cm nodule opacity in the right lower lobe with additional scattered small bilateral pulmonary nodules.  It also reported slightly increased size of mildly asymmetric prominent left axillary lymph nodes.  There is also mild asymmetric elevation of the left hemidiaphragm with a new segmental atelectasis and scarring at the left lung base.    Patient has subsequently  had a PET scan examination at the [facility], and the reported the right lower lobe 1.6 cm solid nodule with a maximum SUV 3.6.  Multiple additional bilateral subcentimeter solid nodules too small for accurate PET characterization but mainly had uptake greater than background 11.  There was no enlarged hypermetabolic mediastinal or hilar lymph nodes.  In the abdomen, there were 2 dominant hypermetabolic hypodense right hepatic lesions with reference 2.7 cm with SUV 11.1.  There is at least 3 additional smaller hypermetabolic pedicle foci with the left hepatic lobe SUV 3.7.  The lesion in the caudate lobe had a maximum SUV 5.5.  There are multiple pulm lesions, the T6 lesion had SUV 10.8, and the right ilium lesion maximum SUV 10.7, and the right sacrum lesion SUV 9.7, and the left supra-acetabular ilium lesion with SUV 4.6.    The patient was seen by thoracic surgeon Dr. Riggs on 7/11/2024, Dr. Riggs recommended CT-guided core needle biopsy of the liver lesion.  This was done on 7/19/2024.  Pathology evaluation reported metastatic melanoma.     Patient reports today that he was diagnosed with melanoma in his left eye approximately 12 years ago during a routine eye exam. At that time, his eyesight was unimpaired, but a patch was placed in his eye. He spent about a week in a room following the procedure. He lost approximately 40 percent of his vision due to blockage. He received radiation therapy in [facility].     He experienced a minor headache a few weeks ago, which is unusual for him. He denies experiencing nausea, vomiting, or abdominal pain. His appetite is normal. He experienced weight loss of 10 pounds about 1 to 2 months ago, but has since regained it and is attempting to lose weight again.    He is scheduled for an MRI of the spine on 08/09/2024. His back pain, which he rates as 6 out of 10 at its worst, is located in the lower back at the sacrum area. He also reports weakness in his legs and  is not currently taking any medication for the pain. He has consulted with a neurosurgeon and has informed them of his cancer diagnosis.    Laboratory Studies on 7/25/2024  Hemoglobin 13.7 MCV 92.4, platelets 282,000 WBC 9360 neutrophils 4490 lymphocytes 3170, monocytes 1000 eosinophil 540 basophil 100 and immature granulocytes 60.    Biopsy from the liver confirmed metastatic melanoma.    MEDICATIONS    Current Outpatient Medications:     albuterol (PROVENTIL HFA;VENTOLIN HFA) 108 (90 BASE) MCG/ACT inhaler, Inhale 2 puffs every 4 (four) hours as needed for wheezing., Disp: 1 inhaler, Rfl: 3    allopurinol (ZYLOPRIM) 100 MG tablet, Take 1 tablet by mouth 2 (Two) Times a Day., Disp: , Rfl:     amLODIPine (NORVASC) 10 MG tablet, Take 1 tablet by mouth Daily., Disp: , Rfl: 6    cholecalciferol (VITAMIN D3) 25 MCG (1000 UT) tablet, Take 1 tablet by mouth Daily., Disp: , Rfl:     Cyanocobalamin (VITAMIN B-12 PO), Take  by mouth., Disp: , Rfl:     diazePAM (VALIUM) 5 MG tablet, TAKE 1 TO 2 TABLETS BY MOUTH 30 MINUTES PRIOR TO MRI, Disp: , Rfl:     Enbrel SureClick 50 MG/ML solution auto-injector, Inject 50 mg under the skin into the appropriate area as directed 1 (One) Time Per Week., Disp: , Rfl:     ergocalciferol (ERGOCALCIFEROL) 1.25 MG (69323 UT) capsule, Take 1 capsule by mouth 1 (One) Time Per Week., Disp: , Rfl:     gabapentin (NEURONTIN) 100 MG capsule, Take 1 capsule by mouth 3 (Three) Times a Day., Disp: , Rfl:     hydrochlorothiazide (HYDRODIURIL) 12.5 MG tablet, Take 1 tablet by mouth Daily., Disp: , Rfl: 1    HYDROcodone-acetaminophen (NORCO) 5-325 MG per tablet, Take 1 tablet by mouth Every 8 (Eight) Hours As Needed for Moderate Pain., Disp: 60 tablet, Rfl: 0    hydrOXYzine (ATARAX) 25 MG tablet, Take 1 tablet by mouth 3 (Three) Times a Day As Needed for Itching., Disp: 90 tablet, Rfl: 2    lidocaine-prilocaine (EMLA) 2.5-2.5 % cream, Apply nickel-sized amount over Mediport site 30 min prior to access. Do  "not rub in., Disp: 30 g, Rfl: 2    lisinopril (PRINIVIL,ZESTRIL) 40 MG tablet, Take 1 tablet by mouth Daily., Disp: , Rfl: 6    Omega-3 Fatty Acids (fish oil) 1000 MG capsule capsule, Take 1 capsule by mouth Daily With Breakfast. HOLD PER MD INSTR, Disp: , Rfl:     ondansetron (ZOFRAN) 8 MG tablet, Take 1 tablet by mouth., Disp: , Rfl:     predniSONE (DELTASONE) 10 MG tablet, Take 1 tablet by mouth Daily., Disp: 30 tablet, Rfl: 2    tamsulosin (FLOMAX) 0.4 MG capsule 24 hr capsule, TAKE 1 CAPSULE EVERY DAY (Patient taking differently: 1 capsule 2 (Two) Times a Day.), Disp: 90 capsule, Rfl: 0    ALLERGIES:   No Known Allergies    SOCIAL HISTORY:       Social History     Socioeconomic History    Marital status: Single   Tobacco Use    Smoking status: Never     Passive exposure: Never    Smokeless tobacco: Never   Vaping Use    Vaping status: Never Used   Substance and Sexual Activity    Alcohol use: No     Comment: no caffeine     Drug use: No    Sexual activity: Defer   He is a  but has never smoked. He used to drink 2 drinks a day, but he has not drunk in over 20 to 25 years.      FAMILY HISTORY:  Family History   Problem Relation Age of Onset    Depression Mother     COPD Mother     Mental illness Mother     Diabetes Father     Hypertension Father     Heart disease Father     Hyperlipidemia Father     Cancer Father     Liver cancer Father     Drug abuse Neg Hx     Allergies Neg Hx     Asthma Neg Hx     Stroke Neg Hx     Malig Hyperthermia Neg Hx    His father had liver, bone cancer. He  of the cancer.  No details available.         Vitals:    10/08/24 1401   BP: 147/77   Pulse: 57   Resp: 16   Temp: 97.5 °F (36.4 °C)   TempSrc: Oral   SpO2: 95%   Weight: 98.6 kg (217 lb 4.8 oz)   Height: 182 cm (71.65\")   PainSc:   2   PainLoc: Shoulder           2024     8:57 AM   Current Status   ECOG score 0     Physical Exam  Constitutional:       Appearance: Normal appearance.   Cardiovascular:      Rate and " Rhythm: Normal rate and regular rhythm.   Pulmonary:      Effort: Pulmonary effort is normal.      Breath sounds: Normal breath sounds.   Abdominal:      Palpations: Abdomen is soft.      Tenderness: There is no abdominal tenderness.   Skin:     General: Skin is warm and dry.   Neurological:      Mental Status: He is alert and oriented to person, place, and time.   Psychiatric:         Mood and Affect: Mood normal.         Behavior: Behavior normal.         Thought Content: Thought content normal.       RECENT LABS:  Results from last 7 days   Lab Units 10/08/24  1351   WBC 10*3/mm3 11.89*   NEUTROS ABS 10*3/mm3 8.07*   HEMOGLOBIN g/dL 13.7   HEMATOCRIT % 43.2   PLATELETS 10*3/mm3 248     Results from last 7 days   Lab Units 10/08/24  1344   SODIUM mmol/L 139   POTASSIUM mmol/L 4.2   CHLORIDE mmol/L 106   CO2 mmol/L 22.9   BUN mg/dL 22   CREATININE mg/dL 1.10   CALCIUM mg/dL 8.6   ALBUMIN g/dL 3.9   BILIRUBIN mg/dL 0.2   ALK PHOS U/L 63   ALT (SGPT) U/L 34   AST (SGOT) U/L 31   GLUCOSE mg/dL 116*             IMAGING:  No new imaging reviewed at this time.       Assessment & Plan     1. Metastatic melanoma in the liver, bilateral lungs and bones.  Patient has a history of left eye melanoma 12 years ago.  Patient had a cough and x-ray subsequently chest CT scan 7/15/2024 showed left lower lobe pulmonary nodule 1.7 cm, and multiple other smaller pulmonary nodules.  PET scan on 7/31/2024 reported multiple hypermetabolic lesions. The patient also has multiple metastatic hypermetabolic bone lesions including the T6 vertebral body and also the bilateral iliac wings and also the left sacrum close to the SI joint.   Patient had a liver biopsy confirmed to be metastatic melanoma.  I discussed with the patient the on 7/25/2024 that I recommended to test his tumor sample by [facility] NGS study to see if the patient will have BRAF mutation so he would be a candidate for oral TKI treatment. Then, we also would look into whether this  patient would be a candidate for other targeted therapy as well as immunotherapy.   Arrangements will be made for brain MRI with and without contrast.  MRI on 8/5/2024 reported no evidence for intracranial metastatic disease.  [facility] NGS study reported stable MSI and TMB at 2.1 m/MB, negative mutation for BRAF, NRAS and KIT.  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   Brain MRI examination on 8/5/2024 reported no evidence for intracranial metastatic disease.  Discussed with the patient on 8/14/2024, he is not a candidate for targeted therapy using oral TKI due to lack of mutation from BRAF NRAS and KIT.  I discussed with the patient for immunotherapy.  We discussed the single agent pembrolizumab versus doublets using nivolumab plus ipilimumab.  Due to the side effects profile, I recommended using a single agent pembrolizumab every 3 weeks due to its better tolerance.  Patient is agreeable.   Had first cycle immunotherapy pembrolizumab 8/27/2024.  Patient presented for evaluation on 9/17/2024, he reports experiencing some skin pruritus without any visible rashes. He also reports improved energy and appetite from the low-dose prednisone 10 mg daily. He denies any nausea, vomiting, or diarrhea. He has been using his inhaler occasionally for mild shortness of breath, which is not a new symptom. Continue current immunotherapy with pembrolizumab.   10/8/2024: Proceed with Cycle #3 Keytruda. Tolerating well overall.     2.  Metastatic cancer to the bone.   PET scan examination showed multiple bone lesions.    I recommended to start the patient on IV Zometa no more than every 4 weeks.  Patient received first dose of Zometa on 7/31/2024.  Had good tolerance.  9/17/2024 patient will be given Zometa today and repeat every 6 weeks in coordinate with his scheduled for immunotherapy every 3 weeks.  10/8/2024: Zometa to be given every 6 weeks. Next dose due on 10/29/2024.      3.  Back pain.  PET scan examination showed multiple bone lesions including T6 vertebral body, and the sacrum and the pelvic bone..    The patient reports low back pain and I looked at the PET scan images. I do not think necessarily the small left SI joint area bone lesion causing the problem. He reports that he was seen by neurosurgeon and is scheduled to have a spine MRI examination on 08/09/2024.  I certainly would like to see the results of that to make final assessment, to see whether this patient would benefit from some radiation therapy or he has lower back pain from other course. He previously had multiple lumbar spine surgeries for pinched nerve/discectomy.  MRI for T/L spine examination at the [facility] 8/9/2024 for new reported no evidence for metastatic disease.  There were extensive multilevel spondylosis in the T-spine and the moderate narrowing of the spinal canal C7-T1 and T4-T5.  The L-spine MRI examination reported marrow replacing lesion in the left sacral gale 1.2 cm, and also the lesion in the left posterior iliac bone measuring 1.7 cm.  There was also evidence of postsurgical changes.    4. Generalized pain.  Today 9/17/2024 he reports pain in his knees, back, and other areas. He has been taking Motrin for pain relief, approximately four times a day. Continue with Motrin as needed for pain management. If pain persists or worsens, consider alternative pain management strategies.  10/8/2024: He takes norco about once a week. Pain very well controlled.       5. Mild leukocytosis.  Laboratory studies on 09/17/2024 show mild leukocytosis with WBC of 11,070. ANC 5660, eosinophils 820, lymphocytes 3440, and monocytes 990. Hemoglobin is normal at 14.2, and platelets are 243,000. CMP, liver function panel, electrolytes, and glucose are all normal. No immediate intervention is required; continue to monitor blood counts.      6.  Vascular access.  Patient had a portacatheter placed by   [facility] 8/23/2024.      PLAN:  Proceed with Cycle #3 Keytruda, to be administered every 3 weeks.   Zometa due at next visit. To be given every 6 weeks.   Continue to follow with PCP for hypertension  Return in 3 weeks for NP visit, Cycle #4 Keytruda, and labs per protocol.   Continue low dose prednisone 10mg daily.   PET scan scheduled on 11/12/2024.   Return to clinic 1 week after for MD visit, scan review, Keytruda, and labs per protocol.    Patient is on a high risk medication requiring close monitoring for toxicity.    Anneliese Moeller, APRN

## 2024-10-08 ENCOUNTER — OFFICE VISIT (OUTPATIENT)
Dept: ONCOLOGY | Facility: CLINIC | Age: 64
End: 2024-10-08
Payer: MEDICARE

## 2024-10-08 ENCOUNTER — INFUSION (OUTPATIENT)
Dept: ONCOLOGY | Facility: HOSPITAL | Age: 64
End: 2024-10-08
Payer: MEDICARE

## 2024-10-08 VITALS
WEIGHT: 217.3 LBS | SYSTOLIC BLOOD PRESSURE: 147 MMHG | HEIGHT: 72 IN | DIASTOLIC BLOOD PRESSURE: 77 MMHG | HEART RATE: 57 BPM | BODY MASS INDEX: 29.43 KG/M2 | OXYGEN SATURATION: 95 % | TEMPERATURE: 97.5 F | RESPIRATION RATE: 16 BRPM

## 2024-10-08 DIAGNOSIS — C69.40 MELANOMA OF UVEA METASTATIC TO LIVER: Primary | ICD-10-CM

## 2024-10-08 DIAGNOSIS — Z45.2 FITTING AND ADJUSTMENT OF VASCULAR CATHETER: ICD-10-CM

## 2024-10-08 DIAGNOSIS — C78.7 MELANOMA OF UVEA METASTATIC TO LIVER: Primary | ICD-10-CM

## 2024-10-08 DIAGNOSIS — C69.40 MELANOMA OF UVEA METASTATIC TO LIVER: ICD-10-CM

## 2024-10-08 DIAGNOSIS — C78.7 MELANOMA OF UVEA METASTATIC TO LIVER: ICD-10-CM

## 2024-10-08 DIAGNOSIS — Z79.899 ENCOUNTER FOR LONG-TERM (CURRENT) USE OF HIGH-RISK MEDICATION: ICD-10-CM

## 2024-10-08 LAB
ALBUMIN SERPL-MCNC: 3.9 G/DL (ref 3.5–5.2)
ALBUMIN/GLOB SERPL: 1.6 G/DL
ALP SERPL-CCNC: 63 U/L (ref 39–117)
ALT SERPL W P-5'-P-CCNC: 34 U/L (ref 1–41)
ANION GAP SERPL CALCULATED.3IONS-SCNC: 10.1 MMOL/L (ref 5–15)
AST SERPL-CCNC: 31 U/L (ref 1–40)
BASOPHILS # BLD AUTO: 0.09 10*3/MM3 (ref 0–0.2)
BASOPHILS NFR BLD AUTO: 0.8 % (ref 0–1.5)
BILIRUB SERPL-MCNC: 0.2 MG/DL (ref 0–1.2)
BUN SERPL-MCNC: 22 MG/DL (ref 8–23)
BUN/CREAT SERPL: 20 (ref 7–25)
CALCIUM SPEC-SCNC: 8.6 MG/DL (ref 8.6–10.5)
CHLORIDE SERPL-SCNC: 106 MMOL/L (ref 98–107)
CO2 SERPL-SCNC: 22.9 MMOL/L (ref 22–29)
CREAT SERPL-MCNC: 1.1 MG/DL (ref 0.76–1.27)
DEPRECATED RDW RBC AUTO: 56.4 FL (ref 37–54)
EGFRCR SERPLBLD CKD-EPI 2021: 75 ML/MIN/1.73
EOSINOPHIL # BLD AUTO: 0.4 10*3/MM3 (ref 0–0.4)
EOSINOPHIL NFR BLD AUTO: 3.4 % (ref 0.3–6.2)
ERYTHROCYTE [DISTWIDTH] IN BLOOD BY AUTOMATED COUNT: 16.7 % (ref 12.3–15.4)
GLOBULIN UR ELPH-MCNC: 2.5 GM/DL
GLUCOSE SERPL-MCNC: 116 MG/DL (ref 65–99)
HCT VFR BLD AUTO: 43.2 % (ref 37.5–51)
HGB BLD-MCNC: 13.7 G/DL (ref 13–17.7)
IMM GRANULOCYTES # BLD AUTO: 0.07 10*3/MM3 (ref 0–0.05)
IMM GRANULOCYTES NFR BLD AUTO: 0.6 % (ref 0–0.5)
LYMPHOCYTES # BLD AUTO: 2.25 10*3/MM3 (ref 0.7–3.1)
LYMPHOCYTES NFR BLD AUTO: 18.9 % (ref 19.6–45.3)
MCH RBC QN AUTO: 29.2 PG (ref 26.6–33)
MCHC RBC AUTO-ENTMCNC: 31.7 G/DL (ref 31.5–35.7)
MCV RBC AUTO: 92.1 FL (ref 79–97)
MONOCYTES # BLD AUTO: 1.01 10*3/MM3 (ref 0.1–0.9)
MONOCYTES NFR BLD AUTO: 8.5 % (ref 5–12)
NEUTROPHILS NFR BLD AUTO: 67.8 % (ref 42.7–76)
NEUTROPHILS NFR BLD AUTO: 8.07 10*3/MM3 (ref 1.7–7)
NRBC BLD AUTO-RTO: 0 /100 WBC (ref 0–0.2)
PLATELET # BLD AUTO: 248 10*3/MM3 (ref 140–450)
PMV BLD AUTO: 8.8 FL (ref 6–12)
POTASSIUM SERPL-SCNC: 4.2 MMOL/L (ref 3.5–5.2)
PROT SERPL-MCNC: 6.4 G/DL (ref 6–8.5)
RBC # BLD AUTO: 4.69 10*6/MM3 (ref 4.14–5.8)
SODIUM SERPL-SCNC: 139 MMOL/L (ref 136–145)
T4 FREE SERPL-MCNC: 1.29 NG/DL (ref 0.92–1.68)
TSH SERPL DL<=0.05 MIU/L-ACNC: 2.92 UIU/ML (ref 0.27–4.2)
WBC NRBC COR # BLD AUTO: 11.89 10*3/MM3 (ref 3.4–10.8)

## 2024-10-08 PROCEDURE — 80053 COMPREHEN METABOLIC PANEL: CPT

## 2024-10-08 PROCEDURE — 84439 ASSAY OF FREE THYROXINE: CPT | Performed by: INTERNAL MEDICINE

## 2024-10-08 PROCEDURE — 85025 COMPLETE CBC W/AUTO DIFF WBC: CPT

## 2024-10-08 PROCEDURE — 84443 ASSAY THYROID STIM HORMONE: CPT | Performed by: INTERNAL MEDICINE

## 2024-10-08 PROCEDURE — 25010000002 HEPARIN LOCK FLUSH PER 10 UNITS: Performed by: INTERNAL MEDICINE

## 2024-10-08 PROCEDURE — 96413 CHEMO IV INFUSION 1 HR: CPT

## 2024-10-08 PROCEDURE — 25010000002 PEMBROLIZUMAB 100 MG/4ML SOLUTION 4 ML VIAL: Performed by: NURSE PRACTITIONER

## 2024-10-08 RX ORDER — SODIUM CHLORIDE 0.9 % (FLUSH) 0.9 %
10 SYRINGE (ML) INJECTION AS NEEDED
OUTPATIENT
Start: 2024-10-08

## 2024-10-08 RX ORDER — HEPARIN SODIUM (PORCINE) LOCK FLUSH IV SOLN 100 UNIT/ML 100 UNIT/ML
500 SOLUTION INTRAVENOUS AS NEEDED
Status: DISCONTINUED | OUTPATIENT
Start: 2024-10-08 | End: 2024-10-08 | Stop reason: HOSPADM

## 2024-10-08 RX ORDER — SODIUM CHLORIDE 9 MG/ML
20 INJECTION, SOLUTION INTRAVENOUS ONCE
Status: CANCELLED | OUTPATIENT
Start: 2024-10-08

## 2024-10-08 RX ORDER — SODIUM CHLORIDE 9 MG/ML
20 INJECTION, SOLUTION INTRAVENOUS ONCE
Status: DISCONTINUED | OUTPATIENT
Start: 2024-10-08 | End: 2024-10-08 | Stop reason: HOSPADM

## 2024-10-08 RX ORDER — HEPARIN SODIUM (PORCINE) LOCK FLUSH IV SOLN 100 UNIT/ML 100 UNIT/ML
500 SOLUTION INTRAVENOUS AS NEEDED
OUTPATIENT
Start: 2024-10-08

## 2024-10-08 RX ADMIN — SODIUM CHLORIDE 200 MG: 9 INJECTION, SOLUTION INTRAVENOUS at 15:21

## 2024-10-08 RX ADMIN — Medication 500 UNITS: at 15:47

## 2024-10-22 ENCOUNTER — PATIENT OUTREACH (OUTPATIENT)
Dept: OTHER | Facility: HOSPITAL | Age: 64
End: 2024-10-22
Payer: COMMERCIAL

## 2024-10-22 NOTE — PROGRESS NOTES
Reviewed chart. Patient with metastatic melanoma. Rec'd C3 Keytruda 10/8. Sees oncology APRN 10/29, scans 11/12, sees Caleb 11/19    Called patient. Left message that I was just touching base to see how he was doing with treatment. Wanted to know if he had any questions/concerns or resource/supportive care needs; requested cb

## 2024-10-25 ENCOUNTER — PATIENT OUTREACH (OUTPATIENT)
Dept: OTHER | Facility: HOSPITAL | Age: 64
End: 2024-10-25
Payer: COMMERCIAL

## 2024-10-25 ENCOUNTER — TELEPHONE (OUTPATIENT)
Dept: ONCOLOGY | Facility: CLINIC | Age: 64
End: 2024-10-25
Payer: COMMERCIAL

## 2024-10-25 NOTE — TELEPHONE ENCOUNTER
Provider: Caleb  Caller: patient  Relationship to Patient: self  Call Back Phone Number: [phone]  Reason for Call: patient cut his wrist and has stiches and wants to know if he can still take his treatment next week

## 2024-10-25 NOTE — TELEPHONE ENCOUNTER
Patient called and stated he had cut his wrist and was wanting to make sure he could get his Keytruda and Zometa next week. Informed patient he should be okay to receive treatment next week and patient will follow up with Anneliese TRIMBLE before treatment.    Patient v/u

## 2024-10-25 NOTE — PROGRESS NOTES
Call from patient. He had a skill saw accident two weeks ago; required surgery to his abrams last Friday. He has not been able to take his prednisone to allow for wound healing, which has caused some joint stiffness.  He states he was feeling much better before his accident and denies issues related to his immunotherapy infusions.      The patient is supposed to have an infusion on Tuesday and sees the surgeon on Wednesday.  Encouraged him to call Dr. Ellis's office to confirm that he can still receiving treatment on Tuesday.  Patient states he will do this.     The patient denies any other questions or concerns.  I will continue to follow; encouraged him to call as needed.

## 2024-10-29 ENCOUNTER — OFFICE VISIT (OUTPATIENT)
Dept: ONCOLOGY | Facility: CLINIC | Age: 64
End: 2024-10-29
Payer: MEDICARE

## 2024-10-29 ENCOUNTER — INFUSION (OUTPATIENT)
Dept: ONCOLOGY | Facility: HOSPITAL | Age: 64
End: 2024-10-29
Payer: MEDICARE

## 2024-10-29 VITALS
HEIGHT: 72 IN | BODY MASS INDEX: 29.12 KG/M2 | HEART RATE: 60 BPM | DIASTOLIC BLOOD PRESSURE: 82 MMHG | RESPIRATION RATE: 16 BRPM | SYSTOLIC BLOOD PRESSURE: 126 MMHG | OXYGEN SATURATION: 96 % | WEIGHT: 215 LBS | TEMPERATURE: 97.8 F

## 2024-10-29 DIAGNOSIS — C69.92 MALIGNANT NEOPLASM OF LEFT EYE: ICD-10-CM

## 2024-10-29 DIAGNOSIS — C69.32 MALIGNANT NEOPLASM OF LEFT CHOROID: ICD-10-CM

## 2024-10-29 DIAGNOSIS — C79.51 SECONDARY CANCER OF BONE: ICD-10-CM

## 2024-10-29 DIAGNOSIS — C78.7 MELANOMA OF UVEA METASTATIC TO LIVER: ICD-10-CM

## 2024-10-29 DIAGNOSIS — C78.7 CANCER, METASTATIC TO LIVER: ICD-10-CM

## 2024-10-29 DIAGNOSIS — C78.7 MELANOMA OF UVEA METASTATIC TO LIVER: Primary | ICD-10-CM

## 2024-10-29 DIAGNOSIS — Z79.899 ENCOUNTER FOR LONG-TERM (CURRENT) USE OF HIGH-RISK MEDICATION: ICD-10-CM

## 2024-10-29 DIAGNOSIS — C69.40 MELANOMA OF UVEA METASTATIC TO LIVER: Primary | ICD-10-CM

## 2024-10-29 DIAGNOSIS — C69.40 MELANOMA OF UVEA METASTATIC TO LIVER: ICD-10-CM

## 2024-10-29 LAB
ALBUMIN SERPL-MCNC: 3.8 G/DL (ref 3.5–5.2)
ALBUMIN/GLOB SERPL: 1.4 G/DL
ALP SERPL-CCNC: 67 U/L (ref 39–117)
ALT SERPL W P-5'-P-CCNC: 26 U/L (ref 1–41)
ANION GAP SERPL CALCULATED.3IONS-SCNC: 11.5 MMOL/L (ref 5–15)
AST SERPL-CCNC: 26 U/L (ref 1–40)
BASOPHILS # BLD AUTO: 0.09 10*3/MM3 (ref 0–0.2)
BASOPHILS NFR BLD AUTO: 0.7 % (ref 0–1.5)
BILIRUB SERPL-MCNC: 0.2 MG/DL (ref 0–1.2)
BUN SERPL-MCNC: 24 MG/DL (ref 8–23)
BUN/CREAT SERPL: 21.4 (ref 7–25)
CALCIUM SPEC-SCNC: 8.8 MG/DL (ref 8.6–10.5)
CHLORIDE SERPL-SCNC: 108 MMOL/L (ref 98–107)
CO2 SERPL-SCNC: 21.5 MMOL/L (ref 22–29)
CREAT SERPL-MCNC: 1.12 MG/DL (ref 0.76–1.27)
DEPRECATED RDW RBC AUTO: 55.4 FL (ref 37–54)
EGFRCR SERPLBLD CKD-EPI 2021: 72.9 ML/MIN/1.73
EOSINOPHIL # BLD AUTO: 0.9 10*3/MM3 (ref 0–0.4)
EOSINOPHIL NFR BLD AUTO: 7.3 % (ref 0.3–6.2)
ERYTHROCYTE [DISTWIDTH] IN BLOOD BY AUTOMATED COUNT: 16.5 % (ref 12.3–15.4)
GLOBULIN UR ELPH-MCNC: 2.8 GM/DL
GLUCOSE SERPL-MCNC: 113 MG/DL (ref 65–99)
HCT VFR BLD AUTO: 41.6 % (ref 37.5–51)
HGB BLD-MCNC: 13.2 G/DL (ref 13–17.7)
IMM GRANULOCYTES # BLD AUTO: 0.04 10*3/MM3 (ref 0–0.05)
IMM GRANULOCYTES NFR BLD AUTO: 0.3 % (ref 0–0.5)
LYMPHOCYTES # BLD AUTO: 3.53 10*3/MM3 (ref 0.7–3.1)
LYMPHOCYTES NFR BLD AUTO: 28.7 % (ref 19.6–45.3)
MAGNESIUM SERPL-MCNC: 2.3 MG/DL (ref 1.6–2.4)
MCH RBC QN AUTO: 29 PG (ref 26.6–33)
MCHC RBC AUTO-ENTMCNC: 31.7 G/DL (ref 31.5–35.7)
MCV RBC AUTO: 91.4 FL (ref 79–97)
MONOCYTES # BLD AUTO: 1.05 10*3/MM3 (ref 0.1–0.9)
MONOCYTES NFR BLD AUTO: 8.6 % (ref 5–12)
NEUTROPHILS NFR BLD AUTO: 54.4 % (ref 42.7–76)
NEUTROPHILS NFR BLD AUTO: 6.67 10*3/MM3 (ref 1.7–7)
NRBC BLD AUTO-RTO: 0 /100 WBC (ref 0–0.2)
PHOSPHATE SERPL-MCNC: 3.2 MG/DL (ref 2.5–4.5)
PLATELET # BLD AUTO: 355 10*3/MM3 (ref 140–450)
PMV BLD AUTO: 8.8 FL (ref 6–12)
POTASSIUM SERPL-SCNC: 4.2 MMOL/L (ref 3.5–5.2)
PROT SERPL-MCNC: 6.6 G/DL (ref 6–8.5)
RBC # BLD AUTO: 4.55 10*6/MM3 (ref 4.14–5.8)
SODIUM SERPL-SCNC: 141 MMOL/L (ref 136–145)
WBC NRBC COR # BLD AUTO: 12.28 10*3/MM3 (ref 3.4–10.8)

## 2024-10-29 PROCEDURE — 84100 ASSAY OF PHOSPHORUS: CPT

## 2024-10-29 PROCEDURE — 96413 CHEMO IV INFUSION 1 HR: CPT

## 2024-10-29 PROCEDURE — 25010000002 PEMBROLIZUMAB 100 MG/4ML SOLUTION 4 ML VIAL

## 2024-10-29 PROCEDURE — 83735 ASSAY OF MAGNESIUM: CPT

## 2024-10-29 PROCEDURE — 85025 COMPLETE CBC W/AUTO DIFF WBC: CPT

## 2024-10-29 PROCEDURE — 96375 TX/PRO/DX INJ NEW DRUG ADDON: CPT

## 2024-10-29 PROCEDURE — 80053 COMPREHEN METABOLIC PANEL: CPT

## 2024-10-29 PROCEDURE — 25010000002 ZOLEDRONIC ACID 4 MG/100ML SOLUTION

## 2024-10-29 RX ORDER — ZOLEDRONIC ACID 0.04 MG/ML
4 INJECTION, SOLUTION INTRAVENOUS ONCE
Status: COMPLETED | OUTPATIENT
Start: 2024-10-29 | End: 2024-10-29

## 2024-10-29 RX ADMIN — ZOLEDRONIC ACID 4 MG: 0.04 INJECTION, SOLUTION INTRAVENOUS at 10:12

## 2024-10-29 RX ADMIN — SODIUM CHLORIDE 200 MG: 900 INJECTION, SOLUTION INTRAVENOUS at 10:29

## 2024-10-29 NOTE — PROGRESS NOTES
"REASON FOR FOLLOW-UP:     Provide an opinion on any further workup or treatment of metastatic melanoma.                             HISTORY OF PRESENT ILLNESS:   He presents today for cycle 4 Keytruda.  On 10/14/2024 he was at work and suffered an injury from a skill saw and lacerated his wrist including the tendons and nerves.  He subsequently underwent debridement on 10/18/2024 and has since been taking antibiotics which she finished a couple days ago. He has decreased sensation in his thumb, index, and middle fingers. It does look like his wound has dehisced. He is currently applying neosporin and covering the wound with gauze and a bandage. Picture attached under media. He denies fever or chills. He is scheduled to see his surgeon tomorrow at 1:45pm. He otherwise is doing well- denies shortness of breath, chest pain, nausea/vomiting/diarrhea, fatigue, or skin rash. He has been off the prednisone since surgery.     Past Medical History:   Diagnosis Date    Acute renal failure (ARF) 09/2018    RESOLVED AFTER DEHYDRATION RESOLVED    Anemia     Arthritis     OA AND RA    Asthma     Atrial fibrillation with rapid ventricular response     Bone cancer     BPH (benign prostatic hyperplasia)     Chronic back pain     CKD (chronic kidney disease)     Stage 3    COPD (chronic obstructive pulmonary disease)     Coronary artery calcification seen on CAT scan     Eye cancer, left     TREATMENT WITH RADIATION. SOME VISON LOSS LEFT EYE    Gout     Hepatitis C     IN REMISSION. BEEN LONGER THAN 5 YRS    Hiatal hernia     History of atrial fibrillation     SEPT 2018. WITH SEVERE DEHYDRATION    History of blood transfusion 1978    in [place], no known reaction mild shortness of breath    Hypertension     Liver cancer     Lung cancer     MRSA infection     HX OF    Pneumonia     Psoriasis     Right hip pain     Short of breath on exertion     Sleep apnea     \"MILD\". DID NOT FOLLOW THUR GETTING MACHINE RELATED TO COST OF MACHINE "    Syncope 09/2018    secondary to atrial fibrillation with rapid ventricular response. SEVERELY DEHYDRATED AT THIS TIME.    Viral hepatitis     Vision loss of left eye     EYE CANCER    Vitamin D deficiency      Past Surgical History:   Procedure Laterality Date    BACK SURGERY      LUMBAR DISC X2. NO HARDWARE    CARPAL TUNNEL RELEASE WITH CUBITAL TUNNEL RELEASE Right     COLONOSCOPY      COLONOSCOPY N/A 07/30/2021    Procedure: COLONOSCOPY;  Surgeon: Flor Maciel MD;  [place] [facility] [place];  Service: Gastroenterology;  Laterality: N/A;  Diverticulosis    EYE SURGERY Left     Left Eye    HERNIA REPAIR  2016    LUMBAR SPINE SURGERY      x3 in 2000. 2010, 2011    REVISION TOTAL KNEE ARTHROPLASTY Right     TOTAL HIP ARTHROPLASTY Right 05/28/2019    Procedure: RIGHT TOTAL HIP ARTHROPLASTY;  Surgeon: Harish Dominguez MD;  Location: [facility] MAIN OR;  Service: Orthopedics    TOTAL KNEE ARTHROPLASTY Right     TOTAL KNEE ARTHROPLASTY Left 04/25/2024    Procedure: TOTAL KNEE ARTHROPLASTY;  Surgeon: Harish Dominguez MD;  Location: [facility] OR [facility];  Service: Orthopedics;  Laterality: Left;    VENOUS ACCESS DEVICE (PORT) INSERTION Right 8/23/2024    Procedure: INSERTION VENOUS ACCESS DEVICE;  Surgeon: Sal Wilhelm MD;  Location: [facility] MAIN OR;  Service: General;  Laterality: Right;         ONCOLOGY HISTORY:  The patient is a 63 y.o. year old male  who is here for initial evaluation on 7/25/2024 for evaluation of newly diagnosed metastatic melanoma.  This patient has end-stage arthritis required bilateral knee replacement, history of left eye melanoma, lumbar stenosis status post discectomy, hepatitis C, hypertension, and psoriasis.    His primary doctor found lung issues first.  Patient reports he was congested with a cough and dyspnea for about a month.  Patient went to see his primary care physician Dr. Joshua Blake on 4/17/2024 who prescribed doxycycline and had a chest ray examination which reported  multiple pulmonary nodules.  He subsequently had a chest CT on 5/152024 which reported a 1.7 cm nodule opacity in the right lower lobe with additional scattered small bilateral pulmonary nodules.  It also reported slightly increased size of mildly asymmetric prominent left axillary lymph nodes.  There is also mild asymmetric elevation of the left hemidiaphragm with a new segmental atelectasis and scarring at the left lung base.    Patient has subsequently had a PET scan examination at the [facility], and the reported the right lower lobe 1.6 cm solid nodule with a maximum SUV 3.6.  Multiple additional bilateral subcentimeter solid nodules too small for accurate PET characterization but mainly had uptake greater than background 11.  There was no enlarged hypermetabolic mediastinal or hilar lymph nodes.  In the abdomen, there were 2 dominant hypermetabolic hypodense right hepatic lesions with reference 2.7 cm with SUV 11.1.  There is at least 3 additional smaller hypermetabolic pedicle foci with the left hepatic lobe SUV 3.7.  The lesion in the caudate lobe had a maximum SUV 5.5.  There are multiple pulm lesions, the T6 lesion had SUV 10.8, and the right ilium lesion maximum SUV 10.7, and the right sacrum lesion SUV 9.7, and the left supra-acetabular ilium lesion with SUV 4.6.    The patient was seen by thoracic surgeon Dr. Riggs on 7/11/2024, Dr. Riggs recommended CT-guided core needle biopsy of the liver lesion.  This was done on 7/19/2024.  Pathology evaluation reported metastatic melanoma.     Patient reports today that he was diagnosed with melanoma in his left eye approximately 12 years ago during a routine eye exam. At that time, his eyesight was unimpaired, but a patch was placed in his eye. He spent about a week in a room following the procedure. He lost approximately 40 percent of his vision due to blockage. He received radiation therapy in [facility].     He experienced a minor headache a  few weeks ago, which is unusual for him. He denies experiencing nausea, vomiting, or abdominal pain. His appetite is normal. He experienced weight loss of 10 pounds about 1 to 2 months ago, but has since regained it and is attempting to lose weight again.    He is scheduled for an MRI of the spine on 08/09/2024. His back pain, which he rates as 6 out of 10 at its worst, is located in the lower back at the sacrum area. He also reports weakness in his legs and is not currently taking any medication for the pain. He has consulted with a neurosurgeon and has informed them of his cancer diagnosis.    Laboratory Studies on 7/25/2024  Hemoglobin 13.7 MCV 92.4, platelets 282,000 WBC 9360 neutrophils 4490 lymphocytes 3170, monocytes 1000 eosinophil 540 basophil 100 and immature granulocytes 60.    Biopsy from the liver confirmed metastatic melanoma.    MEDICATIONS    Current Outpatient Medications:     albuterol (PROVENTIL HFA;VENTOLIN HFA) 108 (90 BASE) MCG/ACT inhaler, Inhale 2 puffs every 4 (four) hours as needed for wheezing., Disp: 1 inhaler, Rfl: 3    allopurinol (ZYLOPRIM) 100 MG tablet, Take 1 tablet by mouth 2 (Two) Times a Day., Disp: , Rfl:     amLODIPine (NORVASC) 10 MG tablet, Take 1 tablet by mouth Daily., Disp: , Rfl: 6    cholecalciferol (VITAMIN D3) 25 MCG (1000 UT) tablet, Take 1 tablet by mouth Daily., Disp: , Rfl:     Cyanocobalamin (VITAMIN B-12 PO), Take  by mouth., Disp: , Rfl:     diazePAM (VALIUM) 5 MG tablet, TAKE 1 TO 2 TABLETS BY MOUTH 30 MINUTES PRIOR TO MRI, Disp: , Rfl:     Enbrel SureClick 50 MG/ML solution auto-injector, Inject 50 mg under the skin into the appropriate area as directed 1 (One) Time Per Week., Disp: , Rfl:     ergocalciferol (ERGOCALCIFEROL) 1.25 MG (53991 UT) capsule, Take 1 capsule by mouth 1 (One) Time Per Week., Disp: , Rfl:     gabapentin (NEURONTIN) 100 MG capsule, Take 1 capsule by mouth 3 (Three) Times a Day., Disp: , Rfl:     hydrochlorothiazide (HYDRODIURIL) 12.5 MG  tablet, Take 1 tablet by mouth Daily., Disp: , Rfl: 1    HYDROcodone-acetaminophen (NORCO) 5-325 MG per tablet, Take 1 tablet by mouth Every 8 (Eight) Hours As Needed for Moderate Pain., Disp: 60 tablet, Rfl: 0    hydrOXYzine (ATARAX) 25 MG tablet, Take 1 tablet by mouth 3 (Three) Times a Day As Needed for Itching., Disp: 90 tablet, Rfl: 2    lidocaine-prilocaine (EMLA) 2.5-2.5 % cream, Apply nickel-sized amount over Mediport site 30 min prior to access. Do not rub in., Disp: 30 g, Rfl: 2    lisinopril (PRINIVIL,ZESTRIL) 40 MG tablet, Take 1 tablet by mouth Daily., Disp: , Rfl: 6    Omega-3 Fatty Acids (fish oil) 1000 MG capsule capsule, Take 1 capsule by mouth Daily With Breakfast. HOLD PER MD INSTR, Disp: , Rfl:     ondansetron (ZOFRAN) 8 MG tablet, Take 1 tablet by mouth., Disp: , Rfl:     predniSONE (DELTASONE) 10 MG tablet, Take 1 tablet by mouth Daily., Disp: 30 tablet, Rfl: 2    tamsulosin (FLOMAX) 0.4 MG capsule 24 hr capsule, TAKE 1 CAPSULE EVERY DAY (Patient taking differently: 1 capsule 2 (Two) Times a Day.), Disp: 90 capsule, Rfl: 0    ALLERGIES:   No Known Allergies    SOCIAL HISTORY:       Social History     Socioeconomic History    Marital status: Single   Tobacco Use    Smoking status: Never     Passive exposure: Never    Smokeless tobacco: Never   Vaping Use    Vaping status: Never Used   Substance and Sexual Activity    Alcohol use: No     Comment: no caffeine     Drug use: No    Sexual activity: Defer   He is a  but has never smoked. He used to drink 2 drinks a day, but he has not drunk in over 20 to 25 years.      FAMILY HISTORY:  Family History   Problem Relation Age of Onset    Depression Mother     COPD Mother     Mental illness Mother     Diabetes Father     Hypertension Father     Heart disease Father     Hyperlipidemia Father     Cancer Father     Liver cancer Father     Drug abuse Neg Hx     Allergies Neg Hx     Asthma Neg Hx     Stroke Neg Hx     Malig Hyperthermia Neg Hx    His  "father had liver, bone cancer. He  of the cancer.  No details available.         Vitals:    10/29/24 0903   BP: 126/82   Pulse: 60   Resp: 16   Temp: 97.8 °F (36.6 °C)   TempSrc: Oral   SpO2: 96%   Weight: 97.5 kg (215 lb)   Height: 182 cm (71.65\")   PainSc:   3   PainLoc: Wrist         10/8/2024     2:06 PM   Current Status   ECOG score 0       Physical Exam  Constitutional:       Appearance: Normal appearance.   Cardiovascular:      Rate and Rhythm: Normal rate and regular rhythm.   Pulmonary:      Effort: Pulmonary effort is normal.      Breath sounds: Normal breath sounds.   Abdominal:      Palpations: Abdomen is soft.   Musculoskeletal:      Right wrist: Swelling and laceration present. No tenderness.      Comments: Wound dehiscence from the medial to lateral edge of the laceration. +erythema and warmth.    Skin:     General: Skin is warm and dry.   Neurological:      Mental Status: He is alert and oriented to person, place, and time.   Psychiatric:         Mood and Affect: Mood normal.         Behavior: Behavior normal.         Thought Content: Thought content normal.       RECENT LABS:  Results from last 7 days   Lab Units 10/29/24  0842   WBC 10*3/mm3 12.28*   NEUTROS ABS 10*3/mm3 6.67   HEMOGLOBIN g/dL 13.2   HEMATOCRIT % 41.6   PLATELETS 10*3/mm3 355     Results from last 7 days   Lab Units 10/29/24  0842   SODIUM mmol/L 141   POTASSIUM mmol/L 4.2   CHLORIDE mmol/L 108*   CO2 mmol/L 21.5*   BUN mg/dL 24*   CREATININE mg/dL 1.12   CALCIUM mg/dL 8.8   ALBUMIN g/dL 3.8   BILIRUBIN mg/dL 0.2   ALK PHOS U/L 67   ALT (SGPT) U/L 26   AST (SGOT) U/L 26   GLUCOSE mg/dL 113*   MAGNESIUM mg/dL 2.3         IMAGING:  No new imaging reviewed at this time.       Assessment & Plan   1. Metastatic melanoma in the liver, bilateral lungs and bones.  Patient has a history of left eye melanoma 12 years ago.  Patient had a cough and x-ray subsequently chest CT scan 7/15/2024 showed left lower lobe pulmonary nodule 1.7 cm, " and multiple other smaller pulmonary nodules.  PET scan on 7/31/2024 reported multiple hypermetabolic lesions. The patient also has multiple metastatic hypermetabolic bone lesions including the T6 vertebral body and also the bilateral iliac wings and also the left sacrum close to the SI joint.   Patient had a liver biopsy confirmed to be metastatic melanoma.  I discussed with the patient the on 7/25/2024 that I recommended to test his tumor sample by [employer] NGS study to see if the patient will have BRAF mutation so he would be a candidate for oral TKI treatment. Then, we also would look into whether this patient would be a candidate for other targeted therapy as well as immunotherapy.   Arrangements will be made for brain MRI with and without contrast.  MRI on 8/5/2024 reported no evidence for intracranial metastatic disease.  [employer] NGS study reported stable MSI and TMB at 2.1 m/MB, negative mutation for BRAF, NRAS and KIT.  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   Brain MRI examination on 8/5/2024 reported no evidence for intracranial metastatic disease.  Discussed with the patient on 8/14/2024, he is not a candidate for targeted therapy using oral TKI due to lack of mutation from BRAF NRAS and KIT.  I discussed with the patient for immunotherapy.  We discussed the single agent pembrolizumab versus doublets using nivolumab plus ipilimumab.  Due to the side effects profile, I recommended using a single agent pembrolizumab every 3 weeks due to its better tolerance.  Patient is agreeable.   Had first cycle immunotherapy pembrolizumab 8/27/2024.  Patient presented for evaluation on 9/17/2024, he reports experiencing some skin pruritus without any visible rashes. He also reports improved energy and appetite from the low-dose prednisone 10 mg daily. He denies any nausea, vomiting, or diarrhea. He has been using his inhaler occasionally for mild shortness of  breath, which is not a new symptom. Continue current immunotherapy with pembrolizumab.   10/8/2024: Proceed with Cycle #3 Keytruda. Tolerating well overall.   10/29/2024: Proceed with Cycle #4 Keytruda. Tolerating well.     2.  Metastatic cancer to the bone.   PET scan examination showed multiple bone lesions.    I recommended to start the patient on IV Zometa no more than every 4 weeks.  Patient received first dose of Zometa on 7/31/2024.  Had good tolerance.  9/17/2024 patient will be given Zometa today and repeat every 6 weeks in coordinate with his scheduled for immunotherapy every 3 weeks.  10/8/2024: Zometa to be given every 6 weeks. Next dose due on 10/29/2024.     3.  Back pain.  PET scan examination showed multiple bone lesions including T6 vertebral body, and the sacrum and the pelvic bone..    The patient reports low back pain and I looked at the PET scan images. I do not think necessarily the small left SI joint area bone lesion causing the problem. He reports that he was seen by neurosurgeon and is scheduled to have a spine MRI examination on 08/09/2024.  I certainly would like to see the results of that to make final assessment, to see whether this patient would benefit from some radiation therapy or he has lower back pain from other course. He previously had multiple lumbar spine surgeries for pinched nerve/discectomy.  MRI for T/L spine examination at the [facility] 8/9/2024 for new reported no evidence for metastatic disease.  There were extensive multilevel spondylosis in the T-spine and the moderate narrowing of the spinal canal C7-T1 and T4-T5.  The L-spine MRI examination reported marrow replacing lesion in the left sacral gale 1.2 cm, and also the lesion in the left posterior iliac bone measuring 1.7 cm.  There was also evidence of postsurgical changes.    4. Generalized pain.  Today 9/17/2024 he reports pain in his knees, back, and other areas. He has been taking Motrin for pain relief,  approximately four times a day. Continue with Motrin as needed for pain management. If pain persists or worsens, consider alternative pain management strategies.  10/8/2024: He takes norco about once a week. Pain very well controlled.       5. Mild leukocytosis.  Laboratory studies on 09/17/2024 show mild leukocytosis with WBC of 11,070. ANC 5660, eosinophils 820, lymphocytes 3440, and monocytes 990. Hemoglobin is normal at 14.2, and platelets are 243,000. CMP, liver function panel, electrolytes, and glucose are all normal. No immediate intervention is required; continue to monitor blood counts.      6.  Vascular access.  Patient had a portacatheter placed by Dr. Wilhelm 8/23/2024.    7. Lacerated wrist   10/29/2024: He suffered a skill saw accident at work and lacerated his wrist. He underwent surgery for debridement on 10/18/2024 and has been recovering well. Photo uploaded in chart today as the wound looks dehisced. He is scheduled for post op appt tomorrow for ongoing management and further recommendations.       PLAN:   Proceed with Cycle #4 Keytruda.  Proceed with Zometa today. Next dose due 11/26/2024.   Continue to follow with PCP for management of hypertension.   Continue low dose prednisone- 10mg daily. Currently on hold given recent surgery.   Follow up closely with Dr. Thaddeus Osborn (hand surgeon) for ongoing management of laceration and post op care.   PET scan scheduled on 11/12/2024.   Return to clinic 1 week after for MD visit, scan review, Cycle #5 Keytruda, and labs per protocol.   Instructed him to reach out with any new concerns.     Patient is on a high risk medication requiring close monitoring for toxicity.    Anneliese Moeller, APRN

## 2024-11-12 ENCOUNTER — HOSPITAL ENCOUNTER (OUTPATIENT)
Dept: PET IMAGING | Facility: HOSPITAL | Age: 64
Discharge: HOME | End: 2024-11-12
Payer: MEDICARE

## 2024-11-12 DIAGNOSIS — C78.7 MELANOMA OF UVEA METASTATIC TO LIVER: ICD-10-CM

## 2024-11-12 DIAGNOSIS — C69.40 MELANOMA OF UVEA METASTATIC TO LIVER: ICD-10-CM

## 2024-11-12 DIAGNOSIS — C43.9 METASTATIC MALIGNANT MELANOMA: ICD-10-CM

## 2024-11-12 DIAGNOSIS — C78.7 CANCER, METASTATIC TO LIVER: ICD-10-CM

## 2024-11-12 DIAGNOSIS — C79.51 SECONDARY CANCER OF BONE: ICD-10-CM

## 2024-11-12 LAB — GLUCOSE BLDC GLUCOMTR-MCNC: 82 MG/DL (ref 70–130)

## 2024-11-12 PROCEDURE — 78816 PET IMAGE W/CT FULL BODY: CPT

## 2024-11-12 PROCEDURE — 82948 REAGENT STRIP/BLOOD GLUCOSE: CPT

## 2024-11-12 PROCEDURE — 34310000005 FLUDEOXYGLUCOSE F18 SOLUTION: Performed by: INTERNAL MEDICINE

## 2024-11-12 PROCEDURE — A9552 F18 FDG: HCPCS | Performed by: INTERNAL MEDICINE

## 2024-11-12 RX ADMIN — FLUDEOXYGLUCOSE F 18 1 DOSE: 200 INJECTION, SOLUTION INTRAVENOUS at 08:03

## 2024-11-19 ENCOUNTER — INFUSION (OUTPATIENT)
Dept: ONCOLOGY | Facility: HOSPITAL | Age: 64
End: 2024-11-19
Payer: MEDICARE

## 2024-11-19 ENCOUNTER — OFFICE VISIT (OUTPATIENT)
Dept: ONCOLOGY | Facility: CLINIC | Age: 64
End: 2024-11-19
Payer: MEDICARE

## 2024-11-19 VITALS
HEIGHT: 72 IN | OXYGEN SATURATION: 95 % | DIASTOLIC BLOOD PRESSURE: 70 MMHG | BODY MASS INDEX: 29 KG/M2 | SYSTOLIC BLOOD PRESSURE: 114 MMHG | TEMPERATURE: 97.4 F | HEART RATE: 73 BPM | WEIGHT: 214.1 LBS

## 2024-11-19 DIAGNOSIS — C78.7 MELANOMA OF UVEA METASTATIC TO LIVER: Primary | ICD-10-CM

## 2024-11-19 DIAGNOSIS — C69.40 MELANOMA OF UVEA METASTATIC TO LIVER: Primary | ICD-10-CM

## 2024-11-19 DIAGNOSIS — Z45.2 FITTING AND ADJUSTMENT OF VASCULAR CATHETER: ICD-10-CM

## 2024-11-19 DIAGNOSIS — G89.3 CANCER RELATED PAIN: ICD-10-CM

## 2024-11-19 DIAGNOSIS — C78.7 MELANOMA OF UVEA METASTATIC TO LIVER: ICD-10-CM

## 2024-11-19 DIAGNOSIS — Z79.899 ENCOUNTER FOR LONG-TERM (CURRENT) USE OF HIGH-RISK MEDICATION: ICD-10-CM

## 2024-11-19 DIAGNOSIS — C69.40 MELANOMA OF UVEA METASTATIC TO LIVER: ICD-10-CM

## 2024-11-19 DIAGNOSIS — C79.51 SECONDARY CANCER OF BONE: ICD-10-CM

## 2024-11-19 DIAGNOSIS — D72.823 LEUKEMOID REACTION: ICD-10-CM

## 2024-11-19 DIAGNOSIS — N17.9 AKI (ACUTE KIDNEY INJURY): ICD-10-CM

## 2024-11-19 LAB
ALBUMIN SERPL-MCNC: 3.9 G/DL (ref 3.5–5.2)
ALBUMIN/GLOB SERPL: 1.4 G/DL
ALP SERPL-CCNC: 64 U/L (ref 39–117)
ALT SERPL W P-5'-P-CCNC: 35 U/L (ref 1–41)
ANION GAP SERPL CALCULATED.3IONS-SCNC: 13.9 MMOL/L (ref 5–15)
AST SERPL-CCNC: 42 U/L (ref 1–40)
BASOPHILS # BLD AUTO: 0.08 10*3/MM3 (ref 0–0.2)
BASOPHILS NFR BLD AUTO: 0.7 % (ref 0–1.5)
BILIRUB SERPL-MCNC: 0.6 MG/DL (ref 0–1.2)
BUN SERPL-MCNC: 47 MG/DL (ref 8–23)
BUN/CREAT SERPL: 20.5 (ref 7–25)
CALCIUM SPEC-SCNC: 8.7 MG/DL (ref 8.6–10.5)
CHLORIDE SERPL-SCNC: 102 MMOL/L (ref 98–107)
CO2 SERPL-SCNC: 21.1 MMOL/L (ref 22–29)
CREAT SERPL-MCNC: 2.29 MG/DL (ref 0.76–1.27)
DEPRECATED RDW RBC AUTO: 55.1 FL (ref 37–54)
EGFRCR SERPLBLD CKD-EPI 2021: 30.9 ML/MIN/1.73
EOSINOPHIL # BLD AUTO: 0.62 10*3/MM3 (ref 0–0.4)
EOSINOPHIL NFR BLD AUTO: 5.7 % (ref 0.3–6.2)
ERYTHROCYTE [DISTWIDTH] IN BLOOD BY AUTOMATED COUNT: 16.6 % (ref 12.3–15.4)
GLOBULIN UR ELPH-MCNC: 2.7 GM/DL
GLUCOSE SERPL-MCNC: 102 MG/DL (ref 65–99)
HCT VFR BLD AUTO: 41.7 % (ref 37.5–51)
HGB BLD-MCNC: 13.5 G/DL (ref 13–17.7)
IMM GRANULOCYTES # BLD AUTO: 0.04 10*3/MM3 (ref 0–0.05)
IMM GRANULOCYTES NFR BLD AUTO: 0.4 % (ref 0–0.5)
LYMPHOCYTES # BLD AUTO: 2.77 10*3/MM3 (ref 0.7–3.1)
LYMPHOCYTES NFR BLD AUTO: 25.5 % (ref 19.6–45.3)
MCH RBC QN AUTO: 29.2 PG (ref 26.6–33)
MCHC RBC AUTO-ENTMCNC: 32.4 G/DL (ref 31.5–35.7)
MCV RBC AUTO: 90.1 FL (ref 79–97)
MONOCYTES # BLD AUTO: 1.36 10*3/MM3 (ref 0.1–0.9)
MONOCYTES NFR BLD AUTO: 12.5 % (ref 5–12)
NEUTROPHILS NFR BLD AUTO: 5.98 10*3/MM3 (ref 1.7–7)
NEUTROPHILS NFR BLD AUTO: 55.2 % (ref 42.7–76)
NRBC BLD AUTO-RTO: 0 /100 WBC (ref 0–0.2)
PLATELET # BLD AUTO: 225 10*3/MM3 (ref 140–450)
PMV BLD AUTO: 9.5 FL (ref 6–12)
POTASSIUM SERPL-SCNC: 3.8 MMOL/L (ref 3.5–5.2)
PROT SERPL-MCNC: 6.6 G/DL (ref 6–8.5)
RBC # BLD AUTO: 4.63 10*6/MM3 (ref 4.14–5.8)
SODIUM SERPL-SCNC: 137 MMOL/L (ref 136–145)
T4 FREE SERPL-MCNC: 1.22 NG/DL (ref 0.92–1.68)
TSH SERPL DL<=0.05 MIU/L-ACNC: 1.61 UIU/ML (ref 0.27–4.2)
WBC NRBC COR # BLD AUTO: 10.85 10*3/MM3 (ref 3.4–10.8)

## 2024-11-19 PROCEDURE — 84443 ASSAY THYROID STIM HORMONE: CPT | Performed by: INTERNAL MEDICINE

## 2024-11-19 PROCEDURE — 25010000002 HEPARIN LOCK FLUSH PER 10 UNITS: Performed by: INTERNAL MEDICINE

## 2024-11-19 PROCEDURE — 84439 ASSAY OF FREE THYROXINE: CPT | Performed by: INTERNAL MEDICINE

## 2024-11-19 PROCEDURE — 80053 COMPREHEN METABOLIC PANEL: CPT

## 2024-11-19 PROCEDURE — 85025 COMPLETE CBC W/AUTO DIFF WBC: CPT

## 2024-11-19 PROCEDURE — 25810000003 SODIUM CHLORIDE 0.9 % SOLUTION: Performed by: INTERNAL MEDICINE

## 2024-11-19 PROCEDURE — 96360 HYDRATION IV INFUSION INIT: CPT

## 2024-11-19 RX ORDER — SODIUM CHLORIDE 0.9 % (FLUSH) 0.9 %
10 SYRINGE (ML) INJECTION AS NEEDED
Status: CANCELLED | OUTPATIENT
Start: 2024-11-19

## 2024-11-19 RX ORDER — HEPARIN SODIUM (PORCINE) LOCK FLUSH IV SOLN 100 UNIT/ML 100 UNIT/ML
500 SOLUTION INTRAVENOUS AS NEEDED
Status: DISCONTINUED | OUTPATIENT
Start: 2024-11-19 | End: 2024-11-19 | Stop reason: HOSPADM

## 2024-11-19 RX ORDER — HEPARIN SODIUM (PORCINE) LOCK FLUSH IV SOLN 100 UNIT/ML 100 UNIT/ML
500 SOLUTION INTRAVENOUS AS NEEDED
Status: CANCELLED | OUTPATIENT
Start: 2024-11-19

## 2024-11-19 RX ORDER — SODIUM CHLORIDE 0.9 % (FLUSH) 0.9 %
10 SYRINGE (ML) INJECTION AS NEEDED
Status: DISCONTINUED | OUTPATIENT
Start: 2024-11-19 | End: 2024-11-19 | Stop reason: HOSPADM

## 2024-11-19 RX ADMIN — Medication 10 ML: at 11:45

## 2024-11-19 RX ADMIN — Medication 500 UNITS: at 11:45

## 2024-11-19 RX ADMIN — SODIUM CHLORIDE 1000 ML: 9 INJECTION, SOLUTION INTRAVENOUS at 10:24

## 2024-11-19 NOTE — PROGRESS NOTES
REASON FOR FOLLOW-UP:     Provide an opinion on any further workup or treatment of metastatic melanoma.                             HISTORY OF PRESENT ILLNESS:   He presents today for cycle 4 Keytruda.  On 10/14/2024 he was at work and suffered an injury from a skill saw and lacerated his wrist including the tendons and nerves.  He subsequently underwent debridement on 10/18/2024 and has since been taking antibiotics which she finished a couple days ago. He has decreased sensation in his thumb, index, and middle fingers. It does look like his wound has dehisced. He is currently applying neosporin and covering the wound with gauze and a bandage. Picture attached under media. He denies fever or chills. He is scheduled to see his surgeon tomorrow at 1:45pm. He otherwise is doing well- denies shortness of breath, chest pain, nausea/vomiting/diarrhea, fatigue, or skin rash. He has been off the prednisone since surgery.     History of Present Illness  The patient presented to the ER on 11/19/2024 for evaluation to discuss the results of a PET skin examination, which was done after 4 cycles of palliative immunotherapy with pembrolizumab. He is accompanied by his wife.    He sustained a hand injury from a skill saw 4 weeks ago, which required surgical intervention. The injury involved damage to ligaments, small arteries, and a nerve, necessitating the use of a cadaver nerve. He was informed that it would take approximately 200 days for the nerve to regenerate. The injury subsequently became infected but has since resolved. He experiences numbness in three fingers and his thumb. He has been maintaining adequate hydration.    He has been experiencing lower back pain for the past 3 to 4 weeks, which he believes is alleviated by prednisone. He has taken meloxicam three times in the past two weeks for inflammation.    He reports no diarrhea. He has noticed a decrease in gas production over the past 3 weeks.    He discontinued  "Enbrel a few months ago.    Results  Laboratory Studies on 11/19/2024  WBC 10,850, neutrophils 5980, lymphocytes 2770, monocytes 1360, eosinophil 620. Hemoglobin 13.5, platelets 225,000. Creatinine 2.29, BUN 47, glucose 102, AST 42, otherwise unremarkable CMP    Imaging  PET examination 11/12/2024 showed mixed response, with significant improvement of metastatic disease in the liver with representative region SUV 5.1 down from 11.1. Two mildly hypermetric lesions were noted in the right lateral fourth-fifth intercostal space with SUV 6.8 and a new hypomyographic focus in the musculature posterior to the right sixth rib. No definitive measurable mass was seen on the corresponding CT sequence.          Past Medical History:   Diagnosis Date    Acute renal failure (ARF) 09/2018    RESOLVED AFTER DEHYDRATION RESOLVED    Anemia     Arthritis     OA AND RA    Asthma     Atrial fibrillation with rapid ventricular response     Bone cancer     BPH (benign prostatic hyperplasia)     Chronic back pain     CKD (chronic kidney disease)     Stage 3    COPD (chronic obstructive pulmonary disease)     Coronary artery calcification seen on CAT scan     Eye cancer, left     TREATMENT WITH RADIATION. SOME VISON LOSS LEFT EYE    Gout     Hepatitis C     IN REMISSION. BEEN LONGER THAN 5 YRS    Hiatal hernia     History of atrial fibrillation     SEPT 2018. WITH SEVERE DEHYDRATION    History of blood transfusion 1978    in [place], no known reaction mild shortness of breath    Hypertension     Liver cancer     Lung cancer     MRSA infection     HX OF    Pneumonia     Psoriasis     Right hip pain     Short of breath on exertion     Sleep apnea     \"MILD\". DID NOT FOLLOW THUR GETTING MACHINE RELATED TO COST OF MACHINE    Syncope 09/2018    secondary to atrial fibrillation with rapid ventricular response. SEVERELY DEHYDRATED AT THIS TIME.    Viral hepatitis     Vision loss of left eye     EYE CANCER    Vitamin D deficiency      Past " Surgical History:   Procedure Laterality Date    BACK SURGERY      LUMBAR DISC X2. NO HARDWARE    CARPAL TUNNEL RELEASE WITH CUBITAL TUNNEL RELEASE Right     COLONOSCOPY      COLONOSCOPY N/A 07/30/2021    Procedure: COLONOSCOPY;  Surgeon: Flor Maciel MD;  Location: [facility] MAIN OR;  Service: Gastroenterology;  Laterality: N/A;  Diverticulosis    EYE SURGERY Left     Left Eye    HERNIA REPAIR  2016    LUMBAR SPINE SURGERY      x3 in 2000. 2010, 2011    REVISION TOTAL KNEE ARTHROPLASTY Right     TOTAL HIP ARTHROPLASTY Right 05/28/2019    Procedure: RIGHT TOTAL HIP ARTHROPLASTY;  Surgeon: Harish Dominguez MD;  Location: [facility] MAIN OR;  Service: Orthopedics    TOTAL KNEE ARTHROPLASTY Right     TOTAL KNEE ARTHROPLASTY Left 04/25/2024    Procedure: TOTAL KNEE ARTHROPLASTY;  Surgeon: Harish Dominguez MD;  Location: [facility] OR [facility];  Service: Orthopedics;  Laterality: Left;    VENOUS ACCESS DEVICE (PORT) INSERTION Right 8/23/2024    Procedure: INSERTION VENOUS ACCESS DEVICE;  Surgeon: Sal Wilhelm MD;  Location: [facility] MAIN OR;  Service: General;  Laterality: Right;         ONCOLOGY HISTORY:  The patient is a 63 y.o. year old male  who is here for initial evaluation on 7/25/2024 for evaluation of newly diagnosed metastatic melanoma.  This patient has end-stage arthritis required bilateral knee replacement, history of left eye melanoma, lumbar stenosis status post discectomy, hepatitis C, hypertension, and psoriasis.    His primary doctor found lung issues first.  Patient reports he was congested with a cough and dyspnea for about a month.  Patient went to see his primary care physician Dr. Joshua Blake on 4/17/2024 who prescribed doxycycline and had a chest ray examination which reported multiple pulmonary nodules.  He subsequently had a chest CT on 5/152024 which reported a 1.7 cm nodule opacity in the right lower lobe with additional scattered small bilateral pulmonary nodules.  It also reported  slightly increased size of mildly asymmetric prominent left axillary lymph nodes.  There is also mild asymmetric elevation of the left hemidiaphragm with a new segmental atelectasis and scarring at the left lung base.    Patient has subsequently had a PET scan examination at the [facility], and the reported the right lower lobe 1.6 cm solid nodule with a maximum SUV 3.6.  Multiple additional bilateral subcentimeter solid nodules too small for accurate PET characterization but mainly had uptake greater than background 11.  There was no enlarged hypermetabolic mediastinal or hilar lymph nodes.  In the abdomen, there were 2 dominant hypermetabolic hypodense right hepatic lesions with reference 2.7 cm with SUV 11.1.  There is at least 3 additional smaller hypermetabolic pedicle foci with the left hepatic lobe SUV 3.7.  The lesion in the caudate lobe had a maximum SUV 5.5.  There are multiple pulm lesions, the T6 lesion had SUV 10.8, and the right ilium lesion maximum SUV 10.7, and the right sacrum lesion SUV 9.7, and the left supra-acetabular ilium lesion with SUV 4.6.    The patient was seen by thoracic surgeon Dr. Riggs on 7/11/2024, Dr. Riggs recommended CT-guided core needle biopsy of the liver lesion.  This was done on 7/19/2024.  Pathology evaluation reported metastatic melanoma.     Patient reports today that he was diagnosed with melanoma in his left eye approximately 12 years ago during a routine eye exam. At that time, his eyesight was unimpaired, but a patch was placed in his eye. He spent about a week in a room following the procedure. He lost approximately 40 percent of his vision due to blockage. He received radiation therapy in [facility].     He experienced a minor headache a few weeks ago, which is unusual for him. He denies experiencing nausea, vomiting, or abdominal pain. His appetite is normal. He experienced weight loss of 10 pounds about 1 to 2 months ago, but has since regained it  and is attempting to lose weight again.    He is scheduled for an MRI of the spine on 08/09/2024. His back pain, which he rates as 6 out of 10 at its worst, is located in the lower back at the sacrum area. He also reports weakness in his legs and is not currently taking any medication for the pain. He has consulted with a neurosurgeon and has informed them of his cancer diagnosis.    Laboratory Studies on 7/25/2024  Hemoglobin 13.7 MCV 92.4, platelets 282,000 WBC 9360 neutrophils 4490 lymphocytes 3170, monocytes 1000 eosinophil 540 basophil 100 and immature granulocytes 60.    Biopsy from the liver confirmed metastatic melanoma.    MEDICATIONS    Current Outpatient Medications:     albuterol (PROVENTIL HFA;VENTOLIN HFA) 108 (90 BASE) MCG/ACT inhaler, Inhale 2 puffs every 4 (four) hours as needed for wheezing., Disp: 1 inhaler, Rfl: 3    allopurinol (ZYLOPRIM) 100 MG tablet, Take 1 tablet by mouth 2 (Two) Times a Day., Disp: , Rfl:     amLODIPine (NORVASC) 10 MG tablet, Take 1 tablet by mouth Daily., Disp: , Rfl: 6    cholecalciferol (VITAMIN D3) 25 MCG (1000 UT) tablet, Take 1 tablet by mouth Daily., Disp: , Rfl:     Cyanocobalamin (VITAMIN B-12 PO), Take  by mouth., Disp: , Rfl:     diazePAM (VALIUM) 5 MG tablet, TAKE 1 TO 2 TABLETS BY MOUTH 30 MINUTES PRIOR TO MRI, Disp: , Rfl:     ergocalciferol (ERGOCALCIFEROL) 1.25 MG (86125 UT) capsule, Take 1 capsule by mouth 1 (One) Time Per Week., Disp: , Rfl:     gabapentin (NEURONTIN) 100 MG capsule, Take 1 capsule by mouth 3 (Three) Times a Day., Disp: , Rfl:     hydrochlorothiazide (HYDRODIURIL) 12.5 MG tablet, Take 1 tablet by mouth Daily., Disp: , Rfl: 1    HYDROcodone-acetaminophen (NORCO) 5-325 MG per tablet, Take 1 tablet by mouth Every 8 (Eight) Hours As Needed for Moderate Pain., Disp: 60 tablet, Rfl: 0    hydrOXYzine (ATARAX) 25 MG tablet, Take 1 tablet by mouth 3 (Three) Times a Day As Needed for Itching., Disp: 90 tablet, Rfl: 2    lidocaine-prilocaine (EMLA)  2.5-2.5 % cream, Apply nickel-sized amount over Mediport site 30 min prior to access. Do not rub in., Disp: 30 g, Rfl: 2    lisinopril (PRINIVIL,ZESTRIL) 40 MG tablet, Take 1 tablet by mouth Daily., Disp: , Rfl: 6    Omega-3 Fatty Acids (fish oil) 1000 MG capsule capsule, Take 1 capsule by mouth Daily With Breakfast. HOLD PER MD INSTR, Disp: , Rfl:     ondansetron (ZOFRAN) 8 MG tablet, Take 1 tablet by mouth., Disp: , Rfl:     tamsulosin (FLOMAX) 0.4 MG capsule 24 hr capsule, TAKE 1 CAPSULE EVERY DAY (Patient taking differently: 1 capsule 2 (Two) Times a Day.), Disp: 90 capsule, Rfl: 0    Enbrel SureClick 50 MG/ML solution auto-injector, Inject 50 mg under the skin into the appropriate area as directed 1 (One) Time Per Week. (Patient not taking: Reported on 2024), Disp: , Rfl:     predniSONE (DELTASONE) 10 MG tablet, Take 1 tablet by mouth Daily. (Patient not taking: Reported on 2024), Disp: 30 tablet, Rfl: 2    ALLERGIES:   No Known Allergies    SOCIAL HISTORY:       Social History     Socioeconomic History    Marital status: Single   Tobacco Use    Smoking status: Never     Passive exposure: Never    Smokeless tobacco: Never   Vaping Use    Vaping status: Never Used   Substance and Sexual Activity    Alcohol use: No     Comment: no caffeine     Drug use: No    Sexual activity: Defer   He is a  but has never smoked. He used to drink 2 drinks a day, but he has not drunk in over 20 to 25 years.      FAMILY HISTORY:  Family History   Problem Relation Age of Onset    Depression Mother     COPD Mother     Mental illness Mother     Diabetes Father     Hypertension Father     Heart disease Father     Hyperlipidemia Father     Cancer Father     Liver cancer Father     Drug abuse Neg Hx     Allergies Neg Hx     Asthma Neg Hx     Stroke Neg Hx     Malig Hyperthermia Neg Hx    His father had liver, bone cancer. He  of the cancer.  No details available.         Vitals:    24 0926   BP: 114/70  "  Pulse: 73   Temp: 97.4 °F (36.3 °C)   TempSrc: Oral   SpO2: 95%   Weight: 97.1 kg (214 lb 1.6 oz)   Height: 182 cm (71.65\")   PainSc:   6   PainLoc: Back         11/19/2024     9:27 AM   Current Status   ECOG score 0       Physical Exam  Constitutional:       General: He is not in acute distress.     Appearance: Normal appearance.   Eyes:      Conjunctiva/sclera: Conjunctivae normal.   Cardiovascular:      Rate and Rhythm: Normal rate and regular rhythm.   Pulmonary:      Effort: Pulmonary effort is normal.      Breath sounds: Normal breath sounds.   Abdominal:      General: Bowel sounds are normal.      Palpations: Abdomen is soft.   Musculoskeletal:      Right wrist: Swelling and laceration present. No tenderness.      Comments: Wound dehiscence from the medial to lateral edge of the laceration. +erythema and warmth.    Lymphadenopathy:      Cervical: No cervical adenopathy.   Skin:     General: Skin is warm and dry.      Findings: No rash.   Neurological:      Mental Status: He is alert. Mental status is at baseline.   Psychiatric:         Behavior: Behavior normal.         Thought Content: Thought content normal.       RECENT LABS:  Results from last 7 days   Lab Units 11/19/24  0903   WBC 10*3/mm3 10.85*   NEUTROS ABS 10*3/mm3 5.98   HEMOGLOBIN g/dL 13.5   HEMATOCRIT % 41.7   PLATELETS 10*3/mm3 225     Results from last 7 days   Lab Units 11/19/24  0903   SODIUM mmol/L 137   POTASSIUM mmol/L 3.8   CHLORIDE mmol/L 102   CO2 mmol/L 21.1*   BUN mg/dL 47*   CREATININE mg/dL 2.29*   CALCIUM mg/dL 8.7   ALBUMIN g/dL 3.9   BILIRUBIN mg/dL 0.6   ALK PHOS U/L 64   ALT (SGPT) U/L 35   AST (SGOT) U/L 42*   GLUCOSE mg/dL 102*         IMAGING:  NM PET/CT Whole Body  F-18 FDG PET OF THE WHOLE BODY WITH PET CT FUSION.     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     TECHNIQUE: Radiation dose reduction techniques were utilized, including  automated exposure control and exposure modulation based on body size. "   Blood glucose level at time of injection was 82 mg/dL. 6.4 mCi of F-18  FDG were injected and PET was performed from skull vertex through the  lower extremities. CT was obtained for localization and attenuation  correction. Time at injection 8:03 a.m. PET start time 9:25 a.m.  Normalization method: patient weight. Compared with PET/CT 05/31/2024.     FINDINGS: There are mixed findings.  There has been decrease in the intensity of metabolic activity at most  of the metastases, but there are new metastases as well.     1. The right hepatic lobe liver lesion is significantly smaller and has  a 5.1 max SUV, previously 11.1. The CT sequence is very limited for  measurement of the liver metastases in the absence of IV contrast and  streak artifact from the patient's arms. On the PET sequence, the right  hepatic lobe liver lesion measures 1.3 cm, previously 2.5 cm. Similar  decrease in size and intensity of metabolic activity at a similar size  lesion inferiorly at the right hepatic lobe with a 5.2 max SUV,  previously 11.1. There are no new liver metastases.     T6 lesion is smaller and has a max SUV 8.7, previously 10.8. Right iliac  wing lesion is significantly smaller and has a 6.6 max SUV, previously  10.7. Left sacral metastasis has a 9.0 max SUV, previously 9.7. The tiny  left acetabular metastasis is smaller and has a max SUV 4.7, previously  4.6. However, the left ischial metastasis is more hypermetabolic with a  4.9 max SUV, previously 4.3.     There has been decrease in size of the hypermetabolic 1.3 cm  pleural-based nodule at the posteromedial aspect of the right lower lobe  previously measuring 1.8 cm, but the intensity of the metabolic activity  has not significantly decreased with a max SUV 3.6.     2. There is a new hypermetabolic soft tissue subpleural nodule at the  right lateral 4th-5th intercostal space with a 6.8 max SUV. There is  also a new hypermetabolic focus at the musculature posterior to  the  right 6th rib. No definite measurable mass is seen on the corresponding  CT sequence, but this likely represents a tiny soft tissue metastasis.     3. Follow-up of pulmonary nodules is recommended with a standard chest  CT given the differences in breathing artifact on the PET CTs and the  current streak artifact from the patient's arms as the arms are put down  by the patient's sides for a whole body PET/CT.     4. There is no suspicious hypermetabolic activity at the calvarium or  neck. No suspicious activity is seen at the upper or lower extremities.     This report was finalized on 11/14/2024 1:26 PM by Dr. Anneliese Valdes M.D  on Workstation: [id number]             Assessment & Plan   Assessment & Plan      1. Metastatic melanoma in the liver, bilateral lungs and bones.  Patient has a history of left eye melanoma 12 years ago.  Patient had a cough and x-ray subsequently chest CT scan 7/15/2024 showed left lower lobe pulmonary nodule 1.7 cm, and multiple other smaller pulmonary nodules.  PET scan on 7/31/2024 reported multiple hypermetabolic lesions. The patient also has multiple metastatic hypermetabolic bone lesions including the T6 vertebral body and also the bilateral iliac wings and also the left sacrum close to the SI joint.   Patient had a liver biopsy confirmed to be metastatic melanoma.  I discussed with the patient the on 7/25/2024 that I recommended to test his tumor sample by [employer] NGS study to see if the patient will have BRAF mutation so he would be a candidate for oral TKI treatment. Then, we also would look into whether this patient would be a candidate for other targeted therapy as well as immunotherapy.   Arrangements will be made for brain MRI with and without contrast.  MRI on 8/5/2024 reported no evidence for intracranial metastatic disease.  [employer] NGS study reported stable MSI and TMB at 2.1 m/MB, negative mutation for BRAF, NRAS and KIT.  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   Brain MRI examination on 8/5/2024 reported no evidence for intracranial metastatic disease.  Discussed with the patient on 8/14/2024, he is not a candidate for targeted therapy using oral TKI due to lack of mutation from BRAF NRAS and KIT.  I discussed with the patient for immunotherapy.  We discussed the single agent pembrolizumab versus doublets using nivolumab plus ipilimumab.  Due to the side effects profile, I recommended using a single agent pembrolizumab every 3 weeks due to its better tolerance.  Patient is agreeable.   Had first cycle immunotherapy pembrolizumab 8/27/2024.  Patient presented for evaluation on 9/17/2024, he reports experiencing some skin pruritus without any visible rashes. He also reports improved energy and appetite from the low-dose prednisone 10 mg daily. He denies any nausea, vomiting, or diarrhea. He has been using his inhaler occasionally for mild shortness of breath, which is not a new symptom. Continue current immunotherapy with pembrolizumab.   10/8/2024: Proceed with Cycle #3 Keytruda. Tolerating well overall.   10/29/2024: Proceed with Cycle #4 Keytruda. Tolerating well.    After 4 cycles of pembrolizumab, the PET scan revealed a mixed response, predominantly showing significant reduction in metastatic liver lesions and resolution of two small liver metastases. Notable improvement was also observed in some bone areas. However, two new small lesions were detected on the right chest wall. The images were reviewed with the patient and his wife, and it was recommended to continue pembrolizumab for another 4 cycles, followed by PET scan again. If the two areas on the right chest wall continue to grow while other areas respond, local radiation therapy will be considered.     2.  Metastatic cancer to the bone.   PET scan examination showed multiple bone lesions.    I recommended to start the patient on IV Zometa no more than every  4 weeks.  Patient received first dose of Zometa on 7/31/2024.  Had good tolerance.  9/17/2024 patient will be given Zometa today and repeat every 6 weeks in coordinate with his scheduled for immunotherapy every 3 weeks.  10/29/2024 Zometa was given and to be given every 6 weeks.     3.  Back pain.  PET scan examination showed multiple bone lesions including T6 vertebral body, and the sacrum and the pelvic bone..    The patient reports low back pain and I looked at the PET scan images. I do not think necessarily the small left SI joint area bone lesion causing the problem. He reports that he was seen by neurosurgeon and is scheduled to have a spine MRI examination on 08/09/2024.  I certainly would like to see the results of that to make final assessment, to see whether this patient would benefit from some radiation therapy or he has lower back pain from other course. He previously had multiple lumbar spine surgeries for pinched nerve/discectomy.  MRI for T/L spine examination at the [facility] 8/9/2024 for new reported no evidence for metastatic disease.  There were extensive multilevel spondylosis in the T-spine and the moderate narrowing of the spinal canal C7-T1 and T4-T5.  The L-spine MRI examination reported marrow replacing lesion in the left sacral gale 1.2 cm, and also the lesion in the left posterior iliac bone measuring 1.7 cm.  There was also evidence of postsurgical changes.   11/19/2024 3 He continues to experience low back pain on the left side. He reported taking three doses of meloxicam, which may have contributed to his renal injury. He was advised against further use of meloxicam, and he expressed understanding.    4. Generalized pain.  Today 9/17/2024 he reports pain in his knees, back, and other areas. He has been taking Motrin for pain relief, approximately four times a day. Continue with Motrin as needed for pain management. If pain persists or worsens, consider alternative pain  management strategies.  10/8/2024: He takes norco about once a week. Pain very well controlled.       5. Mild leukocytosis.  Laboratory studies on 09/17/2024 show mild leukocytosis with WBC of 11,070. ANC 5660, eosinophils 820, lymphocytes 3440, and monocytes 990. Hemoglobin is normal at 14.2, and platelets are 243,000. CMP, liver function panel, electrolytes, and glucose are all normal. No immediate intervention is required; continue to monitor blood counts.  11/19/2024 today's lab study showed a slight improvement in leukocytosis, which is currently only marginally elevated at 10,850, with mostly elevated monocytes 1360 and eosinophils 620, but normal neutrophils and lymphocytes.    6.  Vascular access.  Patient had a portacatheter placed by Dr. Wilhelm 8/23/2024.    7. Lacerated wrist   10/29/2024: He suffered a skill saw accident at work and lacerated his wrist. He underwent surgery for debridement on 10/18/2024 and has been recovering well. Photo uploaded in chart today as the wound looks dehisced. He is scheduled for post op appt tomorrow for ongoing management and further recommendations.   11/19/2024 he reports improvement in his right wrist. He will continue to follow up with the hand surgeon and keep the scheduled appointment.    8. Acute renal injury.  Today on 11/19/2024 renal function has significantly deteriorated, with a creatinine level of 2.29 and a BUN of 47, likely due to dehydration. He admitted to reduced water intake. Intravenous hydration with 1 L normal saline was recommended daily, and Keytruda was postponed until the coming Friday, with the hope of improved renal function by then. A lab study will be conducted this Friday prior to Keytruda administration. He was advised to increase water intake to maintain hydration at home.      PLAN:   The fifth cycle of Keytruda will be postponed for 3 days due to acute renal injury.   Intravenous hydration with 1 L normal saline was recommended  daily.  Patient was encouraged to increase oral water intake at home.   A lab study will be conducted in 3 days, this Friday.  If patient has improved renal function to baseline, will proceed ahead with cycle 5 Keytruda administration.   Next dose Zometa due 11/26/2024, adjusted with immunotherapy cycle.  A PET scan will be scheduled after the eighth cycle of Keytruda for reassessment, particularly of the small new hypermetabolic lesion.   Continue to follow with PCP for management of hypertension.   Continue low dose prednisone- 10mg daily.   Follow up closely with Dr. Thaddeus Osborn (hand surgeon) for ongoing management of laceration and post op care.   Patient return on 12/13/2024 with the evaluation by APRN, laboratory studies per protocol and cycle #6 immunotherapy with Keytruda.  I will see patient on 1/3/2024 with laboratory studies prior to cycle #7 immunotherapy Keytruda.    Instructed him to reach out with any new concerns.     I shared the PET scan images from 11/12/2024 with the patient today.    Patient is on a high risk medication requiring close monitoring for toxicity.    I spent 50 minutes caring for Renzo on this date of service. This time includes time spent by me in the following activities: preparing for the visit, reviewing tests, obtaining and/or reviewing a separately obtained history, performing a medically appropriate examination and/or evaluation, counseling and educating the patient/family/caregiver, ordering medications, tests, or procedures, referring and communicating with other health care professionals, documenting information in the medical record, independently interpreting results and communicating that information with the patient/family/caregiver and care coordination     DARYL BERMUDEZ M.D., Ph.D.

## 2024-11-20 ENCOUNTER — PATIENT OUTREACH (OUTPATIENT)
Dept: OTHER | Facility: HOSPITAL | Age: 64
End: 2024-11-20
Payer: COMMERCIAL

## 2024-11-20 ENCOUNTER — INFUSION (OUTPATIENT)
Dept: ONCOLOGY | Facility: HOSPITAL | Age: 64
End: 2024-11-20
Payer: MEDICARE

## 2024-11-20 VITALS
HEART RATE: 75 BPM | SYSTOLIC BLOOD PRESSURE: 115 MMHG | DIASTOLIC BLOOD PRESSURE: 70 MMHG | BODY MASS INDEX: 29.5 KG/M2 | TEMPERATURE: 98.2 F | WEIGHT: 215.4 LBS | OXYGEN SATURATION: 95 % | RESPIRATION RATE: 18 BRPM

## 2024-11-20 DIAGNOSIS — N28.9 RENAL INSUFFICIENCY: Primary | ICD-10-CM

## 2024-11-20 PROCEDURE — 96360 HYDRATION IV INFUSION INIT: CPT

## 2024-11-20 PROCEDURE — 25810000003 SODIUM CHLORIDE 0.9 % SOLUTION: Performed by: INTERNAL MEDICINE

## 2024-11-20 RX ORDER — SODIUM CHLORIDE 9 MG/ML
500 INJECTION, SOLUTION INTRAVENOUS ONCE
Status: COMPLETED | OUTPATIENT
Start: 2024-11-20 | End: 2024-11-20

## 2024-11-20 RX ADMIN — SODIUM CHLORIDE 500 ML/HR: 9 INJECTION, SOLUTION INTRAVENOUS at 15:11

## 2024-11-20 NOTE — PROGRESS NOTES
Reviewed chart. Had PET scan 11/12, which revealed decrease in right hepatic lobe liver, T6 and RLL lesion although should possible soft tissue metastasis (a new hypermetabolic soft tissue subpleural nodule at the right lateral 4th-5th intercostal space with a 6.8 max SUV and a new hypermetabolic focus at the musculature posterior to the right 6th rib) Met with Dr Bone yesterday.  Continues Keytruda with IVFs    Met patient in infusion today.  We discussed his scan results and visit with Dr. Ellis yesterday. He continues Keytruda with IVFs as needed. The patient states that Dr. Ellis may refer him for radiation in the future to the new areas identified on PET.  The patient stated he discussed a new treatment that he read about at [facility] with Dr. Ellis, although he could not remember the name of the treatment.     The patient states he is eating well and gaining weight.  He has been receiving OP therapy to his right hand after his right arm injury requiring surgery/nerve grafting.    The patient states he filled for financial assistance although is waiting to hear back.    We again discussed integrative therapies and other services at [facility]. Provided Neda's number for massage. I will continue to follow; encouraged patient to call as needed.

## 2024-11-21 ENCOUNTER — INFUSION (OUTPATIENT)
Dept: ONCOLOGY | Facility: HOSPITAL | Age: 64
End: 2024-11-21
Payer: MEDICARE

## 2024-11-21 VITALS
WEIGHT: 215.4 LBS | DIASTOLIC BLOOD PRESSURE: 89 MMHG | HEART RATE: 96 BPM | TEMPERATURE: 97.5 F | BODY MASS INDEX: 29.5 KG/M2 | OXYGEN SATURATION: 96 % | SYSTOLIC BLOOD PRESSURE: 175 MMHG | RESPIRATION RATE: 16 BRPM

## 2024-11-21 DIAGNOSIS — Z45.2 FITTING AND ADJUSTMENT OF VASCULAR CATHETER: Primary | ICD-10-CM

## 2024-11-21 PROCEDURE — 96360 HYDRATION IV INFUSION INIT: CPT

## 2024-11-21 PROCEDURE — 25010000002 HEPARIN LOCK FLUSH PER 10 UNITS: Performed by: INTERNAL MEDICINE

## 2024-11-21 PROCEDURE — 25810000003 SODIUM CHLORIDE 0.9 % SOLUTION: Performed by: INTERNAL MEDICINE

## 2024-11-21 RX ORDER — SODIUM CHLORIDE 9 MG/ML
500 INJECTION, SOLUTION INTRAVENOUS ONCE
Status: COMPLETED | OUTPATIENT
Start: 2024-11-21 | End: 2024-11-21

## 2024-11-21 RX ORDER — HEPARIN SODIUM (PORCINE) LOCK FLUSH IV SOLN 100 UNIT/ML 100 UNIT/ML
500 SOLUTION INTRAVENOUS AS NEEDED
OUTPATIENT
Start: 2024-11-21

## 2024-11-21 RX ORDER — HEPARIN SODIUM (PORCINE) LOCK FLUSH IV SOLN 100 UNIT/ML 100 UNIT/ML
500 SOLUTION INTRAVENOUS AS NEEDED
Status: DISCONTINUED | OUTPATIENT
Start: 2024-11-21 | End: 2024-11-21 | Stop reason: HOSPADM

## 2024-11-21 RX ORDER — SODIUM CHLORIDE 0.9 % (FLUSH) 0.9 %
10 SYRINGE (ML) INJECTION AS NEEDED
Status: DISCONTINUED | OUTPATIENT
Start: 2024-11-21 | End: 2024-11-21 | Stop reason: HOSPADM

## 2024-11-21 RX ORDER — SODIUM CHLORIDE 0.9 % (FLUSH) 0.9 %
10 SYRINGE (ML) INJECTION AS NEEDED
OUTPATIENT
Start: 2024-11-21

## 2024-11-21 RX ADMIN — Medication 500 UNITS: at 16:22

## 2024-11-21 RX ADMIN — SODIUM CHLORIDE 500 ML/HR: 9 INJECTION, SOLUTION INTRAVENOUS at 15:44

## 2024-11-21 RX ADMIN — Medication 10 ML: at 16:23

## 2024-11-22 ENCOUNTER — INFUSION (OUTPATIENT)
Dept: ONCOLOGY | Facility: HOSPITAL | Age: 64
End: 2024-11-22
Payer: MEDICARE

## 2024-11-22 VITALS
TEMPERATURE: 97.3 F | HEART RATE: 70 BPM | WEIGHT: 215.4 LBS | BODY MASS INDEX: 29.5 KG/M2 | DIASTOLIC BLOOD PRESSURE: 72 MMHG | OXYGEN SATURATION: 95 % | SYSTOLIC BLOOD PRESSURE: 158 MMHG | RESPIRATION RATE: 16 BRPM

## 2024-11-22 DIAGNOSIS — Z79.899 ENCOUNTER FOR LONG-TERM (CURRENT) USE OF HIGH-RISK MEDICATION: Primary | ICD-10-CM

## 2024-11-22 DIAGNOSIS — C69.40 MELANOMA OF UVEA METASTATIC TO LIVER: ICD-10-CM

## 2024-11-22 DIAGNOSIS — C78.7 MELANOMA OF UVEA METASTATIC TO LIVER: ICD-10-CM

## 2024-11-22 LAB
ALBUMIN SERPL-MCNC: 4 G/DL (ref 3.5–5.2)
ALBUMIN/GLOB SERPL: 1.4 G/DL
ALP SERPL-CCNC: 66 U/L (ref 39–117)
ALT SERPL W P-5'-P-CCNC: 32 U/L (ref 1–41)
ANION GAP SERPL CALCULATED.3IONS-SCNC: 12.8 MMOL/L (ref 5–15)
AST SERPL-CCNC: 30 U/L (ref 1–40)
BASOPHILS # BLD AUTO: 0.1 10*3/MM3 (ref 0–0.2)
BASOPHILS NFR BLD AUTO: 1 % (ref 0–1.5)
BILIRUB SERPL-MCNC: 0.3 MG/DL (ref 0–1.2)
BUN SERPL-MCNC: 18 MG/DL (ref 8–23)
BUN/CREAT SERPL: 18.2 (ref 7–25)
CALCIUM SPEC-SCNC: 9.6 MG/DL (ref 8.6–10.5)
CHLORIDE SERPL-SCNC: 105 MMOL/L (ref 98–107)
CO2 SERPL-SCNC: 23.2 MMOL/L (ref 22–29)
CREAT SERPL-MCNC: 0.99 MG/DL (ref 0.76–1.27)
DEPRECATED RDW RBC AUTO: 53.2 FL (ref 37–54)
EGFRCR SERPLBLD CKD-EPI 2021: 84.5 ML/MIN/1.73
EOSINOPHIL # BLD AUTO: 0.61 10*3/MM3 (ref 0–0.4)
EOSINOPHIL NFR BLD AUTO: 6.4 % (ref 0.3–6.2)
ERYTHROCYTE [DISTWIDTH] IN BLOOD BY AUTOMATED COUNT: 16 % (ref 12.3–15.4)
GLOBULIN UR ELPH-MCNC: 2.8 GM/DL
GLUCOSE SERPL-MCNC: 134 MG/DL (ref 65–99)
HCT VFR BLD AUTO: 42.8 % (ref 37.5–51)
HGB BLD-MCNC: 13.6 G/DL (ref 13–17.7)
IMM GRANULOCYTES # BLD AUTO: 0.03 10*3/MM3 (ref 0–0.05)
IMM GRANULOCYTES NFR BLD AUTO: 0.3 % (ref 0–0.5)
LYMPHOCYTES # BLD AUTO: 1.78 10*3/MM3 (ref 0.7–3.1)
LYMPHOCYTES NFR BLD AUTO: 18.5 % (ref 19.6–45.3)
MCH RBC QN AUTO: 28.9 PG (ref 26.6–33)
MCHC RBC AUTO-ENTMCNC: 31.8 G/DL (ref 31.5–35.7)
MCV RBC AUTO: 91.1 FL (ref 79–97)
MONOCYTES # BLD AUTO: 0.73 10*3/MM3 (ref 0.1–0.9)
MONOCYTES NFR BLD AUTO: 7.6 % (ref 5–12)
NEUTROPHILS NFR BLD AUTO: 6.35 10*3/MM3 (ref 1.7–7)
NEUTROPHILS NFR BLD AUTO: 66.2 % (ref 42.7–76)
NRBC BLD AUTO-RTO: 0 /100 WBC (ref 0–0.2)
PLATELET # BLD AUTO: 255 10*3/MM3 (ref 140–450)
PMV BLD AUTO: 9.3 FL (ref 6–12)
POTASSIUM SERPL-SCNC: 4.6 MMOL/L (ref 3.5–5.2)
PROT SERPL-MCNC: 6.8 G/DL (ref 6–8.5)
RBC # BLD AUTO: 4.7 10*6/MM3 (ref 4.14–5.8)
SODIUM SERPL-SCNC: 141 MMOL/L (ref 136–145)
WBC NRBC COR # BLD AUTO: 9.6 10*3/MM3 (ref 3.4–10.8)

## 2024-11-22 PROCEDURE — 96361 HYDRATE IV INFUSION ADD-ON: CPT

## 2024-11-22 PROCEDURE — 80053 COMPREHEN METABOLIC PANEL: CPT

## 2024-11-22 PROCEDURE — 85025 COMPLETE CBC W/AUTO DIFF WBC: CPT

## 2024-11-22 PROCEDURE — 25010000002 PEMBROLIZUMAB 100 MG/4ML SOLUTION 4 ML VIAL: Performed by: INTERNAL MEDICINE

## 2024-11-22 PROCEDURE — 25810000003 SODIUM CHLORIDE 0.9 % SOLUTION: Performed by: INTERNAL MEDICINE

## 2024-11-22 PROCEDURE — 96413 CHEMO IV INFUSION 1 HR: CPT

## 2024-11-22 RX ORDER — SODIUM CHLORIDE 9 MG/ML
20 INJECTION, SOLUTION INTRAVENOUS ONCE
Status: CANCELLED | OUTPATIENT
Start: 2024-11-22

## 2024-11-22 RX ORDER — SODIUM CHLORIDE 9 MG/ML
20 INJECTION, SOLUTION INTRAVENOUS ONCE
Status: DISCONTINUED | OUTPATIENT
Start: 2024-11-22 | End: 2024-11-22 | Stop reason: HOSPADM

## 2024-11-22 RX ORDER — SODIUM CHLORIDE 9 MG/ML
500 INJECTION, SOLUTION INTRAVENOUS ONCE
Status: COMPLETED | OUTPATIENT
Start: 2024-11-22 | End: 2024-11-22

## 2024-11-22 RX ADMIN — SODIUM CHLORIDE 200 MG: 900 INJECTION, SOLUTION INTRAVENOUS at 15:59

## 2024-11-22 RX ADMIN — SODIUM CHLORIDE 500 ML: 9 INJECTION, SOLUTION INTRAVENOUS at 15:00

## 2024-11-22 NOTE — NURSING NOTE
Lab Results   Component Value Date    GLUCOSE 134 (H) 11/22/2024    BUN 18 11/22/2024    CREATININE 0.99 11/22/2024     11/22/2024    K 4.6 11/22/2024     11/22/2024    CALCIUM 9.6 11/22/2024    PROTEINTOT 6.8 11/22/2024    ALBUMIN 4.0 11/22/2024    ALT 32 11/22/2024    AST 30 11/22/2024    ALKPHOS 66 11/22/2024    BILITOT 0.3 11/22/2024    GLOB 2.8 11/22/2024    AGRATIO 1.4 11/22/2024    BCR 18.2 11/22/2024    ANIONGAP 12.8 11/22/2024    EGFR 84.5 11/22/2024    Labs received today after pt did not get tx on 11/19 due to his elevated creatinine of 2.24. Pt's creatnine 0.99 today. Dr. Ellis notified. Per MD, only to give 500mL IVFs and okay to proceed with Keytruda. Message sent to Jayda regarding if pt needs to have his schedule adjusted due to the change in days of his tx.

## 2024-11-25 ENCOUNTER — PATIENT OUTREACH (OUTPATIENT)
Dept: OTHER | Facility: HOSPITAL | Age: 64
End: 2024-11-25
Payer: COMMERCIAL

## 2024-11-25 DIAGNOSIS — G89.3 CANCER RELATED PAIN: ICD-10-CM

## 2024-11-25 RX ORDER — HYDROCODONE BITARTRATE AND ACETAMINOPHEN 5; 325 MG/1; MG/1
1 TABLET ORAL EVERY 8 HOURS PRN
Qty: 60 TABLET | Refills: 0 | Status: SHIPPED | OUTPATIENT
Start: 2024-11-25

## 2024-11-25 RX ORDER — PREDNISONE 10 MG/1
10 TABLET ORAL DAILY
Qty: 30 TABLET | Refills: 2 | Status: SHIPPED | OUTPATIENT
Start: 2024-11-25

## 2024-11-25 NOTE — PROGRESS NOTES
Call from patient. He has the name of the treatment at [facility]; Histrotripsy.     He asked for refills for his Norco and prednisone.  I will communicate this with Dr. Ellis and his nurse Jayda.     We discussed upcoming appts.    I will continue to follow; encouraged patient to call as needed.     Message to Dr. Ellis and Jayda about medication and patient interest in histrotripsy.     Call from patient's friend Jacque. She stated the patient was not feeling well and was congested.  Encouraged her to have the patient contact his PCP to see if he needed to be seen.

## 2024-12-01 PROBLEM — G89.3 CANCER RELATED PAIN: Status: ACTIVE | Noted: 2024-12-01

## 2024-12-08 ENCOUNTER — HOSPITAL ENCOUNTER (OUTPATIENT)
Facility: HOSPITAL | Age: 64
Setting detail: OBSERVATION
Discharge: HOME | End: 2024-12-09
Attending: EMERGENCY MEDICINE | Admitting: INTERNAL MEDICINE
Payer: MEDICARE

## 2024-12-08 ENCOUNTER — APPOINTMENT (OUTPATIENT)
Dept: CT IMAGING | Facility: HOSPITAL | Age: 64
End: 2024-12-08
Payer: MEDICARE

## 2024-12-08 DIAGNOSIS — I48.91 ATRIAL FIBRILLATION WITH RAPID VENTRICULAR RESPONSE: Primary | ICD-10-CM

## 2024-12-08 DIAGNOSIS — C22.9 MALIGNANT NEOPLASM OF LIVER, UNSPECIFIED LIVER MALIGNANCY TYPE: ICD-10-CM

## 2024-12-08 DIAGNOSIS — I26.93 SINGLE SUBSEGMENTAL PULMONARY EMBOLISM WITHOUT ACUTE COR PULMONALE: ICD-10-CM

## 2024-12-08 PROBLEM — E86.0 DEHYDRATION: Status: ACTIVE | Noted: 2024-12-08

## 2024-12-08 PROBLEM — I26.99 ACUTE PULMONARY EMBOLISM: Status: ACTIVE | Noted: 2024-12-08

## 2024-12-08 LAB
ALBUMIN SERPL-MCNC: 3.7 G/DL (ref 3.5–5.2)
ALBUMIN/GLOB SERPL: 1.4 G/DL
ALP SERPL-CCNC: 67 U/L (ref 39–117)
ALT SERPL W P-5'-P-CCNC: 25 U/L (ref 1–41)
ANION GAP SERPL CALCULATED.3IONS-SCNC: 12.2 MMOL/L (ref 5–15)
APTT PPP: 28 SECONDS (ref 22.7–35.4)
AST SERPL-CCNC: 26 U/L (ref 1–40)
BASOPHILS # BLD AUTO: 0.09 10*3/MM3 (ref 0–0.2)
BASOPHILS NFR BLD AUTO: 0.7 % (ref 0–1.5)
BILIRUB SERPL-MCNC: 0.3 MG/DL (ref 0–1.2)
BILIRUB UR QL STRIP: NEGATIVE
BUN SERPL-MCNC: 30 MG/DL (ref 8–23)
BUN/CREAT SERPL: 23.4 (ref 7–25)
CALCIUM SPEC-SCNC: 8.7 MG/DL (ref 8.6–10.5)
CHLORIDE SERPL-SCNC: 106 MMOL/L (ref 98–107)
CLARITY UR: ABNORMAL
CO2 SERPL-SCNC: 19.8 MMOL/L (ref 22–29)
COLOR UR: YELLOW
CREAT SERPL-MCNC: 1.28 MG/DL (ref 0.76–1.27)
DEPRECATED RDW RBC AUTO: 48.2 FL (ref 37–54)
EGFRCR SERPLBLD CKD-EPI 2021: 62.1 ML/MIN/1.73
EOSINOPHIL # BLD AUTO: 0.46 10*3/MM3 (ref 0–0.4)
EOSINOPHIL NFR BLD AUTO: 3.6 % (ref 0.3–6.2)
ERYTHROCYTE [DISTWIDTH] IN BLOOD BY AUTOMATED COUNT: 14.6 % (ref 12.3–15.4)
GEN 5 1HR TROPONIN T REFLEX: 39 NG/L
GLOBULIN UR ELPH-MCNC: 2.7 GM/DL
GLUCOSE SERPL-MCNC: 159 MG/DL (ref 65–99)
GLUCOSE UR STRIP-MCNC: NEGATIVE MG/DL
HCT VFR BLD AUTO: 44.7 % (ref 37.5–51)
HGB BLD-MCNC: 14.5 G/DL (ref 13–17.7)
HGB UR QL STRIP.AUTO: NEGATIVE
HOLD SPECIMEN: NORMAL
HOLD SPECIMEN: NORMAL
IMM GRANULOCYTES # BLD AUTO: 0.05 10*3/MM3 (ref 0–0.05)
IMM GRANULOCYTES NFR BLD AUTO: 0.4 % (ref 0–0.5)
INR PPP: 1.06 (ref 0.9–1.1)
KETONES UR QL STRIP: NEGATIVE
LEUKOCYTE ESTERASE UR QL STRIP.AUTO: NEGATIVE
LYMPHOCYTES # BLD AUTO: 3.1 10*3/MM3 (ref 0.7–3.1)
LYMPHOCYTES NFR BLD AUTO: 23.9 % (ref 19.6–45.3)
MAGNESIUM SERPL-MCNC: 2.3 MG/DL (ref 1.6–2.4)
MCH RBC QN AUTO: 29.2 PG (ref 26.6–33)
MCHC RBC AUTO-ENTMCNC: 32.4 G/DL (ref 31.5–35.7)
MCV RBC AUTO: 90.1 FL (ref 79–97)
MONOCYTES # BLD AUTO: 1.02 10*3/MM3 (ref 0.1–0.9)
MONOCYTES NFR BLD AUTO: 7.9 % (ref 5–12)
NEUTROPHILS NFR BLD AUTO: 63.5 % (ref 42.7–76)
NEUTROPHILS NFR BLD AUTO: 8.23 10*3/MM3 (ref 1.7–7)
NITRITE UR QL STRIP: NEGATIVE
NRBC BLD AUTO-RTO: 0 /100 WBC (ref 0–0.2)
NT-PROBNP SERPL-MCNC: 771 PG/ML (ref 0–900)
PH UR STRIP.AUTO: 5.5 [PH] (ref 5–8)
PLATELET # BLD AUTO: 307 10*3/MM3 (ref 140–450)
PMV BLD AUTO: 9.6 FL (ref 6–12)
POTASSIUM SERPL-SCNC: 4.3 MMOL/L (ref 3.5–5.2)
PROT SERPL-MCNC: 6.4 G/DL (ref 6–8.5)
PROT UR QL STRIP: NEGATIVE
PROTHROMBIN TIME: 14 SECONDS (ref 11.7–14.2)
QT INTERVAL: 325 MS
QTC INTERVAL: 446 MS
RBC # BLD AUTO: 4.96 10*6/MM3 (ref 4.14–5.8)
SODIUM SERPL-SCNC: 138 MMOL/L (ref 136–145)
SP GR UR STRIP: 1.02 (ref 1–1.03)
T4 FREE SERPL-MCNC: 1.34 NG/DL (ref 0.92–1.68)
TROPONIN T DELTA: -2 NG/L
TROPONIN T SERPL HS-MCNC: 41 NG/L
TSH SERPL DL<=0.05 MIU/L-ACNC: 2.07 UIU/ML (ref 0.27–4.2)
TSH SERPL DL<=0.05 MIU/L-ACNC: 2.18 UIU/ML (ref 0.27–4.2)
UROBILINOGEN UR QL STRIP: ABNORMAL
WBC NRBC COR # BLD AUTO: 12.95 10*3/MM3 (ref 3.4–10.8)
WHOLE BLOOD HOLD COAG: NORMAL
WHOLE BLOOD HOLD SPECIMEN: NORMAL

## 2024-12-08 PROCEDURE — 25010000002 ENOXAPARIN PER 10 MG: Performed by: EMERGENCY MEDICINE

## 2024-12-08 PROCEDURE — G0378 HOSPITAL OBSERVATION PER HR: HCPCS

## 2024-12-08 PROCEDURE — 83735 ASSAY OF MAGNESIUM: CPT | Performed by: EMERGENCY MEDICINE

## 2024-12-08 PROCEDURE — 25510000001 IOPAMIDOL PER 1 ML: Performed by: EMERGENCY MEDICINE

## 2024-12-08 PROCEDURE — 83880 ASSAY OF NATRIURETIC PEPTIDE: CPT | Performed by: EMERGENCY MEDICINE

## 2024-12-08 PROCEDURE — 84484 ASSAY OF TROPONIN QUANT: CPT | Performed by: EMERGENCY MEDICINE

## 2024-12-08 PROCEDURE — 93005 ELECTROCARDIOGRAM TRACING: CPT

## 2024-12-08 PROCEDURE — 71275 CT ANGIOGRAPHY CHEST: CPT

## 2024-12-08 PROCEDURE — 93005 ELECTROCARDIOGRAM TRACING: CPT | Performed by: EMERGENCY MEDICINE

## 2024-12-08 PROCEDURE — 36415 COLL VENOUS BLD VENIPUNCTURE: CPT

## 2024-12-08 PROCEDURE — 81003 URINALYSIS AUTO W/O SCOPE: CPT | Performed by: EMERGENCY MEDICINE

## 2024-12-08 PROCEDURE — 96372 THER/PROPH/DIAG INJ SC/IM: CPT

## 2024-12-08 PROCEDURE — 99291 CRITICAL CARE FIRST HOUR: CPT

## 2024-12-08 PROCEDURE — 84443 ASSAY THYROID STIM HORMONE: CPT | Performed by: STUDENT IN AN ORGANIZED HEALTH CARE EDUCATION/TRAINING PROGRAM

## 2024-12-08 PROCEDURE — 96361 HYDRATE IV INFUSION ADD-ON: CPT

## 2024-12-08 PROCEDURE — 80053 COMPREHEN METABOLIC PANEL: CPT | Performed by: EMERGENCY MEDICINE

## 2024-12-08 PROCEDURE — 25810000003 SODIUM CHLORIDE 0.9 % SOLUTION: Performed by: STUDENT IN AN ORGANIZED HEALTH CARE EDUCATION/TRAINING PROGRAM

## 2024-12-08 PROCEDURE — 85025 COMPLETE CBC W/AUTO DIFF WBC: CPT | Performed by: EMERGENCY MEDICINE

## 2024-12-08 PROCEDURE — 85730 THROMBOPLASTIN TIME PARTIAL: CPT | Performed by: EMERGENCY MEDICINE

## 2024-12-08 PROCEDURE — 84439 ASSAY OF FREE THYROXINE: CPT | Performed by: EMERGENCY MEDICINE

## 2024-12-08 PROCEDURE — 84443 ASSAY THYROID STIM HORMONE: CPT | Performed by: EMERGENCY MEDICINE

## 2024-12-08 PROCEDURE — 85610 PROTHROMBIN TIME: CPT | Performed by: EMERGENCY MEDICINE

## 2024-12-08 PROCEDURE — 96374 THER/PROPH/DIAG INJ IV PUSH: CPT

## 2024-12-08 RX ORDER — SODIUM CHLORIDE 9 MG/ML
40 INJECTION, SOLUTION INTRAVENOUS AS NEEDED
Status: DISCONTINUED | OUTPATIENT
Start: 2024-12-08 | End: 2024-12-09 | Stop reason: HOSPADM

## 2024-12-08 RX ORDER — POLYETHYLENE GLYCOL 3350 17 G/17G
17 POWDER, FOR SOLUTION ORAL DAILY PRN
Status: DISCONTINUED | OUTPATIENT
Start: 2024-12-08 | End: 2024-12-09 | Stop reason: HOSPADM

## 2024-12-08 RX ORDER — TAMSULOSIN HYDROCHLORIDE 0.4 MG/1
0.4 CAPSULE ORAL 2 TIMES DAILY
Status: DISCONTINUED | OUTPATIENT
Start: 2024-12-08 | End: 2024-12-09 | Stop reason: HOSPADM

## 2024-12-08 RX ORDER — HYDROXYZINE HYDROCHLORIDE 25 MG/1
25 TABLET, FILM COATED ORAL 3 TIMES DAILY PRN
Status: DISCONTINUED | OUTPATIENT
Start: 2024-12-08 | End: 2024-12-09 | Stop reason: HOSPADM

## 2024-12-08 RX ORDER — ENOXAPARIN SODIUM 100 MG/ML
1 INJECTION SUBCUTANEOUS ONCE
Status: COMPLETED | OUTPATIENT
Start: 2024-12-08 | End: 2024-12-08

## 2024-12-08 RX ORDER — BISACODYL 10 MG
10 SUPPOSITORY, RECTAL RECTAL DAILY PRN
Status: DISCONTINUED | OUTPATIENT
Start: 2024-12-08 | End: 2024-12-09 | Stop reason: HOSPADM

## 2024-12-08 RX ORDER — IOPAMIDOL 755 MG/ML
100 INJECTION, SOLUTION INTRAVASCULAR
Status: COMPLETED | OUTPATIENT
Start: 2024-12-08 | End: 2024-12-08

## 2024-12-08 RX ORDER — LISINOPRIL 40 MG/1
40 TABLET ORAL DAILY
Status: DISCONTINUED | OUTPATIENT
Start: 2024-12-09 | End: 2024-12-09 | Stop reason: HOSPADM

## 2024-12-08 RX ORDER — ONDANSETRON 2 MG/ML
4 INJECTION INTRAMUSCULAR; INTRAVENOUS EVERY 6 HOURS PRN
Status: DISCONTINUED | OUTPATIENT
Start: 2024-12-08 | End: 2024-12-09 | Stop reason: HOSPADM

## 2024-12-08 RX ORDER — ACETAMINOPHEN 325 MG/1
650 TABLET ORAL EVERY 4 HOURS PRN
Status: DISCONTINUED | OUTPATIENT
Start: 2024-12-08 | End: 2024-12-09 | Stop reason: HOSPADM

## 2024-12-08 RX ORDER — ALLOPURINOL 100 MG/1
100 TABLET ORAL 2 TIMES DAILY
Status: DISCONTINUED | OUTPATIENT
Start: 2024-12-08 | End: 2024-12-09 | Stop reason: HOSPADM

## 2024-12-08 RX ORDER — ENOXAPARIN SODIUM 100 MG/ML
1 INJECTION SUBCUTANEOUS EVERY 12 HOURS
Status: DISCONTINUED | OUTPATIENT
Start: 2024-12-09 | End: 2024-12-09 | Stop reason: HOSPADM

## 2024-12-08 RX ORDER — SODIUM CHLORIDE 0.9 % (FLUSH) 0.9 %
10 SYRINGE (ML) INJECTION AS NEEDED
Status: DISCONTINUED | OUTPATIENT
Start: 2024-12-08 | End: 2024-12-09 | Stop reason: HOSPADM

## 2024-12-08 RX ORDER — AMOXICILLIN 250 MG
2 CAPSULE ORAL 2 TIMES DAILY PRN
Status: DISCONTINUED | OUTPATIENT
Start: 2024-12-08 | End: 2024-12-09 | Stop reason: HOSPADM

## 2024-12-08 RX ORDER — ACETAMINOPHEN 650 MG/1
650 SUPPOSITORY RECTAL EVERY 4 HOURS PRN
Status: DISCONTINUED | OUTPATIENT
Start: 2024-12-08 | End: 2024-12-09 | Stop reason: HOSPADM

## 2024-12-08 RX ORDER — ONDANSETRON 4 MG/1
4 TABLET, ORALLY DISINTEGRATING ORAL EVERY 6 HOURS PRN
Status: DISCONTINUED | OUTPATIENT
Start: 2024-12-08 | End: 2024-12-09 | Stop reason: HOSPADM

## 2024-12-08 RX ORDER — GABAPENTIN 100 MG/1
100 CAPSULE ORAL 3 TIMES DAILY
Status: DISCONTINUED | OUTPATIENT
Start: 2024-12-09 | End: 2024-12-09 | Stop reason: HOSPADM

## 2024-12-08 RX ORDER — BISACODYL 5 MG/1
5 TABLET, DELAYED RELEASE ORAL DAILY PRN
Status: DISCONTINUED | OUTPATIENT
Start: 2024-12-08 | End: 2024-12-09 | Stop reason: HOSPADM

## 2024-12-08 RX ORDER — HYDROCODONE BITARTRATE AND ACETAMINOPHEN 5; 325 MG/1; MG/1
1 TABLET ORAL EVERY 8 HOURS PRN
Status: DISCONTINUED | OUTPATIENT
Start: 2024-12-08 | End: 2024-12-09 | Stop reason: HOSPADM

## 2024-12-08 RX ORDER — SODIUM CHLORIDE 0.9 % (FLUSH) 0.9 %
10 SYRINGE (ML) INJECTION EVERY 12 HOURS SCHEDULED
Status: DISCONTINUED | OUTPATIENT
Start: 2024-12-08 | End: 2024-12-09 | Stop reason: HOSPADM

## 2024-12-08 RX ORDER — HYDROCHLOROTHIAZIDE 12.5 MG/1
12.5 TABLET ORAL DAILY
Status: DISCONTINUED | OUTPATIENT
Start: 2024-12-08 | End: 2024-12-09 | Stop reason: HOSPADM

## 2024-12-08 RX ORDER — ACETAMINOPHEN 160 MG/5ML
650 SOLUTION ORAL EVERY 4 HOURS PRN
Status: DISCONTINUED | OUTPATIENT
Start: 2024-12-08 | End: 2024-12-09 | Stop reason: HOSPADM

## 2024-12-08 RX ORDER — AMLODIPINE BESYLATE 5 MG/1
10 TABLET ORAL DAILY
Status: DISCONTINUED | OUTPATIENT
Start: 2024-12-09 | End: 2024-12-09 | Stop reason: HOSPADM

## 2024-12-08 RX ORDER — METOPROLOL TARTRATE 25 MG/1
25 TABLET, FILM COATED ORAL EVERY 12 HOURS SCHEDULED
Status: DISCONTINUED | OUTPATIENT
Start: 2024-12-08 | End: 2024-12-09 | Stop reason: HOSPADM

## 2024-12-08 RX ADMIN — METOPROLOL TARTRATE 5 MG: 1 INJECTION, SOLUTION INTRAVENOUS at 19:02

## 2024-12-08 RX ADMIN — IOPAMIDOL 95 ML: 755 INJECTION, SOLUTION INTRAVENOUS at 18:36

## 2024-12-08 RX ADMIN — TAMSULOSIN HYDROCHLORIDE 0.4 MG: 0.4 CAPSULE ORAL at 21:45

## 2024-12-08 RX ADMIN — SODIUM CHLORIDE 500 ML: 9 INJECTION, SOLUTION INTRAVENOUS at 21:34

## 2024-12-08 RX ADMIN — HYDROCHLOROTHIAZIDE 12.5 MG: 12.5 TABLET ORAL at 21:45

## 2024-12-08 RX ADMIN — Medication 10 ML: at 21:45

## 2024-12-08 RX ADMIN — ALLOPURINOL 100 MG: 100 TABLET ORAL at 21:45

## 2024-12-08 RX ADMIN — ENOXAPARIN SODIUM 100 MG: 100 INJECTION SUBCUTANEOUS at 19:40

## 2024-12-08 RX ADMIN — METOPROLOL TARTRATE 25 MG: 25 TABLET, FILM COATED ORAL at 19:40

## 2024-12-08 NOTE — ED PROVIDER NOTES
" EMERGENCY DEPARTMENT ENCOUNTER    Room Number:  43/43  Date of encounter:  12/8/2024  PCP: Eliud Blake MD  Historian: Patient and significant other  Relevant information and history provided by sources other than the patient will be included below and in the ED Course.  Review of pertinent past medical records may also be included in record below and ED Course.    HPI:  Chief Complaint: \"I have atrial fibrillation again\"  A complete HPI/ROS/PMH/PSH/SH/FH are unobtainable due to: Not applicable  Context: Renzo Streeter is a 64 y.o. male who presents to the ED c/o this gentleman several years ago had atrial fibrillation.  He then went into normal sinus rhythm he is not on any medicine for atrial fibrillation and all the symptoms resolved.  He noticed that he had some palpitations over the past few days.  He happened to check his watch and he goes back to October 11 where he has been having fast recordings on his watch.  His heart rate would go as high as 170s to 180s.  He anticipates that when his atrial fibrillation started.  He currently is undergoing treatment for cancer.  He has liver cancer with metastasis to the bone and lung.  He is on Keytruda.  He is losing weight.  He does not eat or drink well as he does have decreased appetite.  He does have some mild generalized weakness which is at baseline.  Does have mild exertional shortness of breath it has been going on for several weeks and is not new.  Denies any chest pain.  Denies any fevers or chills or any bleeding or blood in the stool.        Previous Episodes: Yes with atrial fibrillation in the past  Current Symptoms: See above.  Currently asymptomatic when he is sitting in bed in room 43 in the ED.    MEDICAL HISTORY REVIEWED  History of liver cancer with metastasis to bone and lung.  History of hypertension.  He had remote history of atrial fibrillation in the past.  I reviewed his medicine list.  He is on Keytruda.      PAST MEDICAL " HISTORY  Active Ambulatory Problems     Diagnosis Date Noted    Abnormal radiographic examination 01/21/2016    Attention deficit hyperactivity disorder (ADHD), combined type 01/21/2016    Disc disorder of cervical region 01/21/2016    Chronic coronary artery disease 01/21/2016    Vascular calcification 01/21/2016    Stenosis of carotid artery 01/21/2016    Chronic pain 01/21/2016    Cough 01/21/2016    Dysuria 01/21/2016    Fall 01/21/2016    Hypertension 01/21/2016    Pain of lower extremity 01/21/2016    Low back pain 01/21/2016    Malignant neoplasm of left eye 01/21/2016    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    Secondary malignant neoplasm 01/21/2016    Numbness of upper extremity 01/21/2016    Numbness of finger 01/21/2016    Skin neoplasm 01/21/2016    Limb weakness 01/21/2016    Abnormal weight loss 01/21/2016    Wheezing 01/21/2016    First degree hemorrhoids 07/22/2016    Atrial fibrillation 08/29/2018    LORENA (acute kidney injury) 09/02/2018    CKD (chronic kidney disease) 09/02/2018    Risk factors for obstructive sleep apnea 09/02/2018    Arthritis 09/02/2018    Hepatitis C 09/02/2018    Radicular syndrome of right leg 08/18/2016    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    Lumbar spondylosis 01/29/2015    Cervical radiculopathy at C8 11/24/2015    Renal insufficiency 09/18/2018    Preoperative clearance 05/23/2019    CRISTINE (obstructive sleep apnea) 05/23/2019    OA (osteoarthritis) of hip 05/28/2019    Pseudogout of knee, left 07/15/2019    Personal history of colonic polyps 05/27/2021    Primary osteoarthritis of left knee 04/25/2024    Cancer, metastatic to liver 07/25/2024    Secondary cancer of bone 07/25/2024    Malignant neoplasm metastatic to both lungs 07/29/2024    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 08/14/2024    Encounter for long-term (current) use of high-risk medication 08/14/2024    Fitting and adjustment of vascular catheter 08/14/2024    Obesity (BMI 30.0-34.9) 08/20/2024    Leukemoid  reaction 09/29/2024    Cancer related pain 12/01/2024     Resolved Ambulatory Problems     Diagnosis Date Noted    No Resolved Ambulatory Problems     Past Medical History:   Diagnosis Date    Acute renal failure (ARF) 09/2018    Anemia     Asthma     Atrial fibrillation with rapid ventricular response     Bone cancer     BPH (benign prostatic hyperplasia)     Chronic back pain     COPD (chronic obstructive pulmonary disease)     Coronary artery calcification seen on CAT scan     Eye cancer, left     Gout     Hiatal hernia     History of atrial fibrillation     History of blood transfusion 1978    Liver cancer     Lung cancer     MRSA infection     Pneumonia     Psoriasis     Right hip pain     Short of breath on exertion     Sleep apnea     Syncope 09/2018    Viral hepatitis     Vision loss of left eye     Vitamin D deficiency          PAST SURGICAL HISTORY  Past Surgical History:   Procedure Laterality Date    BACK SURGERY      LUMBAR DISC X2. NO HARDWARE    CARPAL TUNNEL RELEASE WITH CUBITAL TUNNEL RELEASE Right     COLONOSCOPY      COLONOSCOPY N/A 07/30/2021    Procedure: COLONOSCOPY;  Surgeon: Flor Maciel MD;  [place] [facility] [place];  Service: Gastroenterology;  Laterality: N/A;  Diverticulosis    EYE SURGERY Left     Left Eye    HERNIA REPAIR  2016    LUMBAR SPINE SURGERY      x3 in 2000. 2010, 2011    REVISION TOTAL KNEE ARTHROPLASTY Right     TOTAL HIP ARTHROPLASTY Right 05/28/2019    Procedure: RIGHT TOTAL HIP ARTHROPLASTY;  Surgeon: Harish Dominguez MD;  Location: [facility] MAIN OR;  Service: Orthopedics    TOTAL KNEE ARTHROPLASTY Right     TOTAL KNEE ARTHROPLASTY Left 04/25/2024    Procedure: TOTAL KNEE ARTHROPLASTY;  Surgeon: Harish Dominguez MD;  Location[facility];  Service: Orthopedics;  Laterality: Left;    VENOUS ACCESS DEVICE (PORT) INSERTION Right 8/23/2024    Procedure: INSERTION VENOUS ACCESS DEVICE;  Surgeon: Sal Wilhelm MD;  Location: [facility] MAIN OR;  Service: General;   Laterality: Right;         FAMILY HISTORY  Family History   Problem Relation Age of Onset    Depression Mother     COPD Mother     Mental illness Mother     Diabetes Father     Hypertension Father     Heart disease Father     Hyperlipidemia Father     Cancer Father     Liver cancer Father     Drug abuse Neg Hx     Allergies Neg Hx     Asthma Neg Hx     Stroke Neg Hx     Malig Hyperthermia Neg Hx          SOCIAL HISTORY  Social History     Socioeconomic History    Marital status: Single   Tobacco Use    Smoking status: Never     Passive exposure: Never    Smokeless tobacco: Never   Vaping Use    Vaping status: Never Used   Substance and Sexual Activity    Alcohol use: No     Comment: no caffeine     Drug use: No    Sexual activity: Defer         ALLERGIES  Patient has no known allergies.        REVIEW OF SYSTEMS  Review of Systems     All systems reviewed and negative except for those discussed in HPI.       PHYSICAL EXAM    I have reviewed the triage vital signs and nursing notes.    ED Triage Vitals   Temp Heart Rate Resp BP SpO2   12/08/24 1637 12/08/24 1637 12/08/24 1637 12/08/24 1638 12/08/24 1637   96.9 °F (36.1 °C) 96 16 102/70 98 %      Temp src Heart Rate Source Patient Position BP Location FiO2 (%)   -- -- -- -- --              GENERAL: This is a male that looks older than his stated age.  He looks frail and chronically ill.  No acute distress.Vital signs on my initial evaluation heart rate is about 120s.  Is A-fib on the monitor.  Blood pressure is normal.  Oxygen saturation is normal he is afebrile  HENT: nares patent  Head/neck/ face are symmetric without gross deformity, signs of trauma, or swelling  EYES: no scleral icterus, no conjunctival pallor.  NECK: Supple, no meningismus  CV: Tachycardia with irregular regular rhythm.  RESPIRATORY: normal effort and no respiratory distress.  Clear to auscultation bilaterally  ABDOMEN: soft and nontender  MUSCULOSKELETAL: no deformity.  No edema.  Intact distal  pulses that are equal strong and symmetric..  No coolness cyanosis or pallor.  NEURO: alert and appropriate, moves all extremities, follows commands.  No focal motor or sensory changes  SKIN: warm, dry    Vital signs and nursing notes reviewed.        LAB RESULTS  Recent Results (from the past 24 hours)   ECG 12 Lead Rhythm Change    Collection Time: 12/08/24  4:47 PM   Result Value Ref Range    QT Interval 325 ms    QTC Interval 446 ms   Comprehensive Metabolic Panel    Collection Time: 12/08/24  5:32 PM    Specimen: Blood   Result Value Ref Range    Glucose 159 (H) 65 - 99 mg/dL    BUN 30 (H) 8 - 23 mg/dL    Creatinine 1.28 (H) 0.76 - 1.27 mg/dL    Sodium 138 136 - 145 mmol/L    Potassium 4.3 3.5 - 5.2 mmol/L    Chloride 106 98 - 107 mmol/L    CO2 19.8 (L) 22.0 - 29.0 mmol/L    Calcium 8.7 8.6 - 10.5 mg/dL    Total Protein 6.4 6.0 - 8.5 g/dL    Albumin 3.7 3.5 - 5.2 g/dL    ALT (SGPT) 25 1 - 41 U/L    AST (SGOT) 26 1 - 40 U/L    Alkaline Phosphatase 67 39 - 117 U/L    Total Bilirubin 0.3 0.0 - 1.2 mg/dL    Globulin 2.7 gm/dL    A/G Ratio 1.4 g/dL    BUN/Creatinine Ratio 23.4 7.0 - 25.0    Anion Gap 12.2 5.0 - 15.0 mmol/L    eGFR 62.1 >60.0 mL/min/1.73   Green Top (Gel)    Collection Time: 12/08/24  5:32 PM   Result Value Ref Range    Extra Tube Hold for add-ons.    Lavender Top    Collection Time: 12/08/24  5:32 PM   Result Value Ref Range    Extra Tube hold for add-on    Gold Top - SST    Collection Time: 12/08/24  5:32 PM   Result Value Ref Range    Extra Tube Hold for add-ons.    Light Blue Top    Collection Time: 12/08/24  5:32 PM   Result Value Ref Range    Extra Tube Hold for add-ons.    CBC Auto Differential    Collection Time: 12/08/24  5:32 PM    Specimen: Blood   Result Value Ref Range    WBC 12.95 (H) 3.40 - 10.80 10*3/mm3    RBC 4.96 4.14 - 5.80 10*6/mm3    Hemoglobin 14.5 13.0 - 17.7 g/dL    Hematocrit 44.7 37.5 - 51.0 %    MCV 90.1 79.0 - 97.0 fL    MCH 29.2 26.6 - 33.0 pg    MCHC 32.4 31.5 - 35.7  g/dL    RDW 14.6 12.3 - 15.4 %    RDW-SD 48.2 37.0 - 54.0 fl    MPV 9.6 6.0 - 12.0 fL    Platelets 307 140 - 450 10*3/mm3    Neutrophil % 63.5 42.7 - 76.0 %    Lymphocyte % 23.9 19.6 - 45.3 %    Monocyte % 7.9 5.0 - 12.0 %    Eosinophil % 3.6 0.3 - 6.2 %    Basophil % 0.7 0.0 - 1.5 %    Immature Grans % 0.4 0.0 - 0.5 %    Neutrophils, Absolute 8.23 (H) 1.70 - 7.00 10*3/mm3    Lymphocytes, Absolute 3.10 0.70 - 3.10 10*3/mm3    Monocytes, Absolute 1.02 (H) 0.10 - 0.90 10*3/mm3    Eosinophils, Absolute 0.46 (H) 0.00 - 0.40 10*3/mm3    Basophils, Absolute 0.09 0.00 - 0.20 10*3/mm3    Immature Grans, Absolute 0.05 0.00 - 0.05 10*3/mm3    nRBC 0.0 0.0 - 0.2 /100 WBC   Protime-INR    Collection Time: 12/08/24  5:32 PM    Specimen: Blood   Result Value Ref Range    Protime 14.0 11.7 - 14.2 Seconds    INR 1.06 0.90 - 1.10   aPTT    Collection Time: 12/08/24  5:32 PM    Specimen: Blood   Result Value Ref Range    PTT 28.0 22.7 - 35.4 seconds   TSH    Collection Time: 12/08/24  5:32 PM    Specimen: Blood   Result Value Ref Range    TSH 2.180 0.270 - 4.200 uIU/mL   T4, Free    Collection Time: 12/08/24  5:32 PM    Specimen: Blood   Result Value Ref Range    Free T4 1.34 0.92 - 1.68 ng/dL   Magnesium    Collection Time: 12/08/24  5:32 PM    Specimen: Blood   Result Value Ref Range    Magnesium 2.3 1.6 - 2.4 mg/dL   High Sensitivity Troponin T    Collection Time: 12/08/24  5:32 PM    Specimen: Blood   Result Value Ref Range    HS Troponin T 41 (H) <22 ng/L   BNP    Collection Time: 12/08/24  5:32 PM    Specimen: Blood   Result Value Ref Range    proBNP 771.0 0.0 - 900.0 pg/mL   Urinalysis With Microscopic If Indicated (No Culture) - Urine, Clean Catch    Collection Time: 12/08/24  6:23 PM    Specimen: Urine, Clean Catch   Result Value Ref Range    Color, UA Yellow Yellow, Straw    Appearance, UA Cloudy (A) Clear    pH, UA 5.5 5.0 - 8.0    Specific Gravity, UA 1.021 1.005 - 1.030    Glucose, UA Negative Negative    Ketones, UA  Negative Negative    Bilirubin, UA Negative Negative    Blood, UA Negative Negative    Protein, UA Negative Negative    Leuk Esterase, UA Negative Negative    Nitrite, UA Negative Negative    Urobilinogen, UA 1.0 E.U./dL 0.2 - 1.0 E.U./dL       Ordered the above labs and independently reviewed the results.        RADIOLOGY  CT Angiogram Chest    Result Date: 12/8/2024  CT ANGIOGRAM OF THE CHEST. MULTIPLE CORONAL, SAGITTAL, AND 3-D RECONSTRUCTIONS.  HISTORY: 64-year-old male with atrial fibrillation. Recently diagnosed with COVID. Metastatic uveal melanoma. Lung, liver, and bone metastases.  TECHNIQUE: Radiation dose reduction techniques were utilized, including automated exposure control and exposure modulation based on body size. CT angiogram of the chest was performed with 2mm images following the administration of IV contrast. Multiple coronal, sagittal, and 3-D reconstruction images were obtained. Compared with CT sequence of PET/CT 11/12/2024.  FINDINGS: 1. There is a very small nonocclusive pulmonary thromboembolus at a right lower lobe segmental pulmonary artery extending into a subsegmental branch. There are no other definitive pulmonary thromboemboli. RV to LV ratio is less than 1.  2. The 1.3 cm pleural nodule at the right lower lobe is stable. There are multiple bilateral pulmonary nodules which are without significant change since the PET/CT given constraints of extensive breathing artifact on the PET/CT. There are no pleural or pericardial effusions.        I ordered the above noted radiological studies. Reviewed by me and discussed with radiologist.  See dictation for official radiology interpretation.      PROCEDURES    Critical Care    Performed by: Hunter James MD  Authorized by: Hunter James MD    Critical care provider statement:     Critical care time (minutes): 30.    Critical care time was exclusive of:  Separately billable procedures and treating other patients    Critical care was  necessary to treat or prevent imminent or life-threatening deterioration of the following conditions: New onset atrial fibrillation with rapid ventricular rate as well as new onset pulmonary embolism.    Critical care was time spent personally by me on the following activities:  Blood draw for specimens, development of treatment plan with patient or surrogate, discussions with consultants, evaluation of patient's response to treatment, examination of patient, obtaining history from patient or surrogate, re-evaluation of patient's condition, review of old charts, pulse oximetry, ordering and review of radiographic studies, ordering and review of laboratory studies and ordering and performing treatments and interventions    I assumed direction of critical care for this patient from another provider in my specialty: no      Care discussed with: admitting provider          MEDICATIONS GIVEN IN ER    Medications   sodium chloride 0.9 % flush 10 mL (has no administration in time range)   metoprolol tartrate (LOPRESSOR) tablet 25 mg (25 mg Oral Given 12/8/24 1940)   iopamidol (ISOVUE-370) 76 % injection 100 mL (95 mL Intravenous Given by Other 12/8/24 1836)   metoprolol tartrate (LOPRESSOR) injection 5 mg (5 mg Intravenous Given 12/8/24 1902)   Enoxaparin Sodium (LOVENOX) syringe 100 mg (100 mg Subcutaneous Given 12/8/24 1940)         All labs have been independently reviewed by me.  All radiology studies have been reviewed by me and I discussed with radiologist dictating the report when indicated below.  All EKG's independently viewed and interpreted by me.  Discussion below represents my analysis of pertinent findings related to patient's condition, differential diagnosis, treatment plan and final disposition.        PROGRESS, DATA ANALYSIS, CONSULTS, AND MEDICAL DECISION MAKING    This is a gentleman that has recurrent atrial fibrillation with rapid ventricular rate.  Informed him of the test that we will order.  He did  have COVID about 3 weeks ago and he currently has cancer.  I am going to do a CT angiogram of his chest just to make sure he does not of a blood clot.  He believes this has been going on since October 11.  He has been tolerating this well.  Will also get a give him some Lopressor.  He will end up getting admitted to the hospital likely to observation unit with cardiology consultation.  All questions answered      ED Course as of 12/08/24 1949   Sun Dec 08, 2024   1709 My own independent or potation of the EKG that was done at 4:47 PM reveals a rate of 113 it is atrial fibrillation it is narrow complex within the normal axis I do not appreciate any acute injury pattern there are some nonspecific ST and T wave changes  When I compared to the previous EKG on 3/26/2024 it was normal sinus rhythm with prolonged OK interval.  Otherwise QRS morphology looks fairly similar. [MM]   1847 HS Troponin T(!): 41 [MM]   1847 Creatinine(!): 1.28  Renal impairment at baseline.  See fluctuations over the past 2 to 3 weeks. [MM]   1847 WBC(!): 12.95 [MM]   1907 Reevaluated this patient.  Patient had good response to metoprolol with his heart rate in the 80s.  He is asymptomatic no pain no shortness of breath.  Informed him again about the treatment plan.  All questions answered at this time. [MM]   1907 HS Troponin T(!): 41  Yellowed from the atrial for relation with rapid ventricular response and his chronic renal failure.  He has never had chest pain currently or prior to arrival here. [MM]   1909 I reviewed the CT scan report of the chest.  There is a very small nonocclusive pulmonary thromboembolism at a right lower lobe segment of the pulmonary artery extending into the subsegmental branch no other definitive pulmonary embolism.  There is 1.3 cm pleural nodule at the right lower lobe which is stable.  Please see complete dictated report from radiologist [MM]   1910 I informed patient of the results of the CT scan and the need for  anticoagulation.  He has no bleeding.  All questions answered. [MM]   1931 I did discuss the case with Levy Aguilar who is on for [facility].  Informed him of the patient presenting symptoms results of test.  Agrees to admit the patient to the hospital.  All questions answered. [MM]      ED Course User Index  [MM] Hunter James MD       AS OF 19:49 EST VITALS:    BP - 114/81  HR - 100  TEMP - 96.9 °F (36.1 °C)  02 SATS - 98%    SOCIAL DETERMINANTS OF HEALTH THAT IMPACT OR LIMIT CARE (For example..Homelessness,safe discharge, inability to obtain care, follow up, or prescriptions):      DIAGNOSIS  Final diagnoses:   Atrial fibrillation with rapid ventricular response   Malignant neoplasm of liver, unspecified liver malignancy type: With metastatic disease to lung, bone   Single subsegmental pulmonary embolism without acute cor pulmonale         DISPOSITION  I have reviewed the test results with my patient and explained the current treatment plan.  I answered all of the patient's questions.  The patient will be admitted to monitor bed at this time.  The patient is not hypotensive and is tolerating their current disease condition well enough for a monitored bed at this time.  The patient's current condition does not require intensive care treatment at this time.            DICTATED UTILIZING DRAGON DICTATION    Note Disclaimer: At [facility], we believe that sharing information builds trust and better relationships. You are receiving this note because you recently visited [facility]. It is possible you will see health information before a provider has talked with you about it. This kind of information can be easy to misunderstand. To help you fully understand what it means for your health, we urge you to discuss this note with your provider.       Hunter James MD  12/08/24 1950

## 2024-12-09 ENCOUNTER — APPOINTMENT (OUTPATIENT)
Dept: CARDIOLOGY | Facility: HOSPITAL | Age: 64
End: 2024-12-09
Payer: MEDICARE

## 2024-12-09 VITALS
DIASTOLIC BLOOD PRESSURE: 60 MMHG | HEIGHT: 72 IN | OXYGEN SATURATION: 95 % | TEMPERATURE: 97.8 F | BODY MASS INDEX: 28.99 KG/M2 | HEART RATE: 95 BPM | WEIGHT: 214 LBS | SYSTOLIC BLOOD PRESSURE: 100 MMHG | RESPIRATION RATE: 18 BRPM

## 2024-12-09 PROBLEM — I26.93 SINGLE SUBSEGMENTAL PULMONARY EMBOLISM WITHOUT ACUTE COR PULMONALE: Status: ACTIVE | Noted: 2024-12-08

## 2024-12-09 PROBLEM — I48.0 PAF (PAROXYSMAL ATRIAL FIBRILLATION): Status: ACTIVE | Noted: 2024-12-09

## 2024-12-09 PROBLEM — E86.0 DEHYDRATION: Status: RESOLVED | Noted: 2024-12-08 | Resolved: 2024-12-09

## 2024-12-09 LAB
ANION GAP SERPL CALCULATED.3IONS-SCNC: 10.2 MMOL/L (ref 5–15)
BH CV ECHO MEAS - ACS: 2.03 CM
BH CV ECHO MEAS - AO MAX PG: 5.7 MMHG
BH CV ECHO MEAS - AO MEAN PG: 3.1 MMHG
BH CV ECHO MEAS - AO ROOT DIAM: 3 CM
BH CV ECHO MEAS - AO V2 MAX: 119.1 CM/SEC
BH CV ECHO MEAS - AO V2 VTI: 22.6 CM
BH CV ECHO MEAS - AVA(I,D): 2.9 CM2
BH CV ECHO MEAS - EDV(CUBED): 86.3 ML
BH CV ECHO MEAS - EDV(MOD-SP2): 53 ML
BH CV ECHO MEAS - EDV(MOD-SP4): 55 ML
BH CV ECHO MEAS - EF(MOD-BP): 52.9 %
BH CV ECHO MEAS - EF(MOD-SP2): 52.8 %
BH CV ECHO MEAS - EF(MOD-SP4): 54.5 %
BH CV ECHO MEAS - ESV(CUBED): 23.3 ML
BH CV ECHO MEAS - ESV(MOD-SP2): 25 ML
BH CV ECHO MEAS - ESV(MOD-SP4): 25 ML
BH CV ECHO MEAS - FS: 35.4 %
BH CV ECHO MEAS - IVS/LVPW: 1.02 CM
BH CV ECHO MEAS - IVSD: 1.25 CM
BH CV ECHO MEAS - LAT PEAK E' VEL: 12.8 CM/SEC
BH CV ECHO MEAS - LV DIASTOLIC VOL/BSA (35-75): 25.1 CM2
BH CV ECHO MEAS - LV MASS(C)D: 201.3 GRAMS
BH CV ECHO MEAS - LV MAX PG: 3.7 MMHG
BH CV ECHO MEAS - LV MEAN PG: 1.9 MMHG
BH CV ECHO MEAS - LV SYSTOLIC VOL/BSA (12-30): 11.4 CM2
BH CV ECHO MEAS - LV V1 MAX: 96.4 CM/SEC
BH CV ECHO MEAS - LV V1 VTI: 16.5 CM
BH CV ECHO MEAS - LVIDD: 4.4 CM
BH CV ECHO MEAS - LVIDS: 2.9 CM
BH CV ECHO MEAS - LVOT AREA: 3.9 CM2
BH CV ECHO MEAS - LVOT DIAM: 2.23 CM
BH CV ECHO MEAS - LVPWD: 1.22 CM
BH CV ECHO MEAS - MED PEAK E' VEL: 11.7 CM/SEC
BH CV ECHO MEAS - MV DEC TIME: 0.22 SEC
BH CV ECHO MEAS - MV E MAX VEL: 86.2 CM/SEC
BH CV ECHO MEAS - MV MAX PG: 4.2 MMHG
BH CV ECHO MEAS - MV MEAN PG: 2.08 MMHG
BH CV ECHO MEAS - MV V2 VTI: 10.9 CM
BH CV ECHO MEAS - MVA(VTI): 5.9 CM2
BH CV ECHO MEAS - PA V2 MAX: 116.9 CM/SEC
BH CV ECHO MEAS - QP/QS: 1.2
BH CV ECHO MEAS - RAP SYSTOLE: 8 MMHG
BH CV ECHO MEAS - RV MAX PG: 2.31 MMHG
BH CV ECHO MEAS - RV V1 MAX: 76 CM/SEC
BH CV ECHO MEAS - RV V1 VTI: 11.1 CM
BH CV ECHO MEAS - RVOT DIAM: 3 CM
BH CV ECHO MEAS - RVSP: 28 MMHG
BH CV ECHO MEAS - SV(LVOT): 64.7 ML
BH CV ECHO MEAS - SV(MOD-SP2): 28 ML
BH CV ECHO MEAS - SV(MOD-SP4): 30 ML
BH CV ECHO MEAS - SV(RVOT): 77.7 ML
BH CV ECHO MEAS - SVI(LVOT): 29.5 ML/M2
BH CV ECHO MEAS - SVI(MOD-SP2): 12.8 ML/M2
BH CV ECHO MEAS - SVI(MOD-SP4): 13.7 ML/M2
BH CV ECHO MEAS - TAPSE (>1.6): 1.39 CM
BH CV ECHO MEAS - TR MAX PG: 20.6 MMHG
BH CV ECHO MEAS - TR MAX VEL: 226.7 CM/SEC
BH CV ECHO MEASUREMENTS AVERAGE E/E' RATIO: 7.04
BH CV XLRA - RV BASE: 3.8 CM
BH CV XLRA - RV LENGTH: 8.3 CM
BH CV XLRA - RV MID: 3.3 CM
BH CV XLRA - TDI S': 12.1 CM/SEC
BUN SERPL-MCNC: 29 MG/DL (ref 8–23)
BUN/CREAT SERPL: 25 (ref 7–25)
CALCIUM SPEC-SCNC: 8.4 MG/DL (ref 8.6–10.5)
CHLORIDE SERPL-SCNC: 109 MMOL/L (ref 98–107)
CO2 SERPL-SCNC: 20.8 MMOL/L (ref 22–29)
CREAT SERPL-MCNC: 1.16 MG/DL (ref 0.76–1.27)
DEPRECATED RDW RBC AUTO: 49.1 FL (ref 37–54)
EGFRCR SERPLBLD CKD-EPI 2021: 69.9 ML/MIN/1.73
ERYTHROCYTE [DISTWIDTH] IN BLOOD BY AUTOMATED COUNT: 14.9 % (ref 12.3–15.4)
GLUCOSE SERPL-MCNC: 92 MG/DL (ref 65–99)
HCT VFR BLD AUTO: 41.6 % (ref 37.5–51)
HGB BLD-MCNC: 13.5 G/DL (ref 13–17.7)
LEFT ATRIUM VOLUME INDEX: 32.5 ML/M2
MCH RBC QN AUTO: 29.4 PG (ref 26.6–33)
MCHC RBC AUTO-ENTMCNC: 32.5 G/DL (ref 31.5–35.7)
MCV RBC AUTO: 90.6 FL (ref 79–97)
PLATELET # BLD AUTO: 271 10*3/MM3 (ref 140–450)
PMV BLD AUTO: 10.2 FL (ref 6–12)
POTASSIUM SERPL-SCNC: 4.1 MMOL/L (ref 3.5–5.2)
RBC # BLD AUTO: 4.59 10*6/MM3 (ref 4.14–5.8)
SINUS: 3 CM
SODIUM SERPL-SCNC: 140 MMOL/L (ref 136–145)
STJ: 2.7 CM
WBC NRBC COR # BLD AUTO: 12.5 10*3/MM3 (ref 3.4–10.8)

## 2024-12-09 PROCEDURE — 85027 COMPLETE CBC AUTOMATED: CPT | Performed by: STUDENT IN AN ORGANIZED HEALTH CARE EDUCATION/TRAINING PROGRAM

## 2024-12-09 PROCEDURE — G0378 HOSPITAL OBSERVATION PER HR: HCPCS

## 2024-12-09 PROCEDURE — 80048 BASIC METABOLIC PNL TOTAL CA: CPT | Performed by: STUDENT IN AN ORGANIZED HEALTH CARE EDUCATION/TRAINING PROGRAM

## 2024-12-09 PROCEDURE — 93306 TTE W/DOPPLER COMPLETE: CPT

## 2024-12-09 PROCEDURE — 36415 COLL VENOUS BLD VENIPUNCTURE: CPT | Performed by: STUDENT IN AN ORGANIZED HEALTH CARE EDUCATION/TRAINING PROGRAM

## 2024-12-09 PROCEDURE — 25010000002 ENOXAPARIN PER 10 MG: Performed by: STUDENT IN AN ORGANIZED HEALTH CARE EDUCATION/TRAINING PROGRAM

## 2024-12-09 PROCEDURE — 96372 THER/PROPH/DIAG INJ SC/IM: CPT

## 2024-12-09 RX ORDER — APIXABAN 5 MG (74)
5 KIT ORAL SEE ADMIN INSTRUCTIONS
Qty: 74 TABLET | Refills: 0 | Status: SHIPPED | OUTPATIENT
Start: 2024-12-09

## 2024-12-09 RX ORDER — TAMSULOSIN HYDROCHLORIDE 0.4 MG/1
1 CAPSULE ORAL 2 TIMES DAILY
Start: 2024-12-09

## 2024-12-09 RX ORDER — METOPROLOL TARTRATE 25 MG/1
25 TABLET, FILM COATED ORAL EVERY 12 HOURS SCHEDULED
Qty: 60 TABLET | Refills: 0 | Status: SHIPPED | OUTPATIENT
Start: 2024-12-09

## 2024-12-09 RX ORDER — ACETAMINOPHEN 325 MG/1
650 TABLET ORAL EVERY 6 HOURS PRN
Start: 2024-12-09

## 2024-12-09 RX ADMIN — TAMSULOSIN HYDROCHLORIDE 0.4 MG: 0.4 CAPSULE ORAL at 08:35

## 2024-12-09 RX ADMIN — METOPROLOL TARTRATE 25 MG: 25 TABLET, FILM COATED ORAL at 08:35

## 2024-12-09 RX ADMIN — ENOXAPARIN SODIUM 100 MG: 100 INJECTION SUBCUTANEOUS at 10:58

## 2024-12-09 RX ADMIN — AMLODIPINE BESYLATE 10 MG: 5 TABLET ORAL at 08:35

## 2024-12-09 RX ADMIN — Medication 10 ML: at 08:36

## 2024-12-09 RX ADMIN — ALLOPURINOL 100 MG: 100 TABLET ORAL at 10:58

## 2024-12-09 RX ADMIN — GABAPENTIN 100 MG: 100 CAPSULE ORAL at 08:35

## 2024-12-09 NOTE — SIGNIFICANT NOTE
12/09/24 0634   OTHER   Discipline physical therapist   Therapy Assessment/Plan (PT)   Criteria for Skilled Interventions Met (PT) no problems identified which require skilled intervention  (63 yo admitted with afib.  Nsg doc pt is up indep/adlib with ampac of 24.  No indication for acute skilled PT)

## 2024-12-09 NOTE — CONSULTS
Date of Hospital Visit: 24  Encounter Provider: Aguilar Hines MD  Place of Service: [facility] CARDIOLOGY  Patient Name: Renzo Streeter  :1960  Referral Provider: Levy Aguilar MD    Chief complaint  Palpitations    History of Present Illness  64-year-old man with a history of A-fib, hypertension, metastatic melanoma who presented with tachycardia since October.  Patient states that his watch has been telling him that his heart rate has been fast.  He has not had any symptoms of palpitations however given that this has been ongoing, he grew concerned so he presented to the ED for further evaluation.    On arrival to the ED, vitals were initially stable and within normal limits.  He did have intermittent tachycardia.  EKG revealed A-fib.  He underwent CTA of the chest which revealed a very small nonocclusive PE in the right lower lobe segmental PA extending of the subsegmental branch.  He was given Lovenox and monitored in the observation unit.  He is currently asymptomatic and eager to go home.    Past Medical History:   Diagnosis Date    Acute renal failure (ARF) 2018    RESOLVED AFTER DEHYDRATION RESOLVED    Anemia     Arthritis     OA AND RA    Asthma     Atrial fibrillation with rapid ventricular response     Bone cancer     BPH (benign prostatic hyperplasia)     Chronic back pain     CKD (chronic kidney disease)     Stage 3    COPD (chronic obstructive pulmonary disease)     Coronary artery calcification seen on CAT scan     Eye cancer, left     TREATMENT WITH RADIATION. SOME VISON LOSS LEFT EYE    Gout     Hepatitis C     IN REMISSION. BEEN LONGER THAN 5 YRS    Hiatal hernia     History of atrial fibrillation     2018. WITH SEVERE DEHYDRATION    History of blood transfusion     in [place], no known reaction mild shortness of breath    Hypertension     Liver cancer     Lung cancer     MRSA infection     HX OF    Pneumonia     Psoriasis     Right hip  "pain     Short of breath on exertion     Sleep apnea     \"MILD\". DID NOT FOLLOW THUR GETTING MACHINE RELATED TO COST OF MACHINE    Syncope 09/2018    secondary to atrial fibrillation with rapid ventricular response. SEVERELY DEHYDRATED AT THIS TIME.    Viral hepatitis     Vision loss of left eye     EYE CANCER    Vitamin D deficiency        Past Surgical History:   Procedure Laterality Date    BACK SURGERY      LUMBAR DISC X2. NO HARDWARE    CARPAL TUNNEL RELEASE WITH CUBITAL TUNNEL RELEASE Right     COLONOSCOPY      COLONOSCOPY N/A 07/30/2021    Procedure: COLONOSCOPY;  Surgeon: Flor Maciel MD;  [place] [facility] [place] [place];  Service: Gastroenterology;  Laterality: N/A;  Diverticulosis    EYE SURGERY Left     Left Eye    HERNIA REPAIR  2016    LUMBAR SPINE SURGERY      x3 in 2000. 2010, 2011    REVISION TOTAL KNEE ARTHROPLASTY Right     TOTAL HIP ARTHROPLASTY Right 05/28/2019    Procedure: RIGHT TOTAL HIP ARTHROPLASTY;  Surgeon: Harish Dominguez MD;  Location: [facility] MAIN OR;  Service: Orthopedics    TOTAL KNEE ARTHROPLASTY Right     TOTAL KNEE ARTHROPLASTY Left 04/25/2024    Procedure: TOTAL KNEE ARTHROPLASTY;  Surgeon: Harish Dominguez MD;  Location: [facility];  Service: Orthopedics;  Laterality: Left;    VENOUS ACCESS DEVICE (PORT) INSERTION Right 8/23/2024    Procedure: INSERTION VENOUS ACCESS DEVICE;  Surgeon: Sal Wilhelm MD;  Location: [facility] MAIN OR;  Service: General;  Laterality: Right;       Medications Prior to Admission   Medication Sig Dispense Refill Last Dose/Taking    albuterol (PROVENTIL HFA;VENTOLIN HFA) 108 (90 BASE) MCG/ACT inhaler Inhale 2 puffs every 4 (four) hours as needed for wheezing. 1 inhaler 3 12/7/2024    allopurinol (ZYLOPRIM) 100 MG tablet Take 1 tablet by mouth 2 (Two) Times a Day.   12/7/2024    amLODIPine (NORVASC) 10 MG tablet Take 1 tablet by mouth Daily.  6 12/8/2024    cholecalciferol (VITAMIN D3) 25 MCG (1000 UT) tablet Take 1 tablet by mouth Daily.   " 12/8/2024    Cyanocobalamin (VITAMIN B-12 PO) Take  by mouth.   12/8/2024    gabapentin (NEURONTIN) 100 MG capsule Take 1 capsule by mouth 3 (Three) Times a Day.   12/8/2024    hydrochlorothiazide (HYDRODIURIL) 12.5 MG tablet Take 1 tablet by mouth Daily.  1 12/8/2024    HYDROcodone-acetaminophen (NORCO) 5-325 MG per tablet Take 1 tablet by mouth Every 8 (Eight) Hours As Needed for Moderate Pain. 60 tablet 0 12/8/2024    hydrOXYzine (ATARAX) 25 MG tablet Take 1 tablet by mouth 3 (Three) Times a Day As Needed for Itching. 90 tablet 2 Past Week    lidocaine-prilocaine (EMLA) 2.5-2.5 % cream Apply nickel-sized amount over Mediport site 30 min prior to access. Do not rub in. 30 g 2 Past Week    lisinopril (PRINIVIL,ZESTRIL) 40 MG tablet Take 1 tablet by mouth Daily.  6 12/8/2024    Omega-3 Fatty Acids (fish oil) 1000 MG capsule capsule Take 1 capsule by mouth Daily With Breakfast. HOLD PER MD TRIPLETT   12/8/2024    ondansetron (ZOFRAN) 8 MG tablet Take 1 tablet by mouth.   12/7/2024    predniSONE (DELTASONE) 10 MG tablet Take 1 tablet by mouth Daily. 30 tablet 2 12/7/2024    tamsulosin (FLOMAX) 0.4 MG capsule 24 hr capsule TAKE 1 CAPSULE EVERY DAY (Patient taking differently: 1 capsule 2 (Two) Times a Day.) 90 capsule 0 12/8/2024    diazePAM (VALIUM) 5 MG tablet TAKE 1 TO 2 TABLETS BY MOUTH 30 MINUTES PRIOR TO MRI   More than a month       Current Meds  Scheduled Meds:allopurinol, 100 mg, Oral, BID  amLODIPine, 10 mg, Oral, Daily  enoxaparin, 1 mg/kg, Subcutaneous, Q12H  gabapentin, 100 mg, Oral, TID  [Held by provider] hydroCHLOROthiazide, 12.5 mg, Oral, Daily  [Held by provider] lisinopril, 40 mg, Oral, Daily  metoprolol tartrate, 25 mg, Oral, Q12H  sodium chloride, 10 mL, Intravenous, Q12H  tamsulosin, 0.4 mg, Oral, BID      Continuous Infusions:   PRN Meds:.  acetaminophen **OR** acetaminophen **OR** acetaminophen    senna-docusate sodium **AND** polyethylene glycol **AND** bisacodyl **AND** bisacodyl     "HYDROcodone-acetaminophen    hydrOXYzine    melatonin    ondansetron ODT **OR** ondansetron    [COMPLETED] Insert Peripheral IV **AND** sodium chloride    sodium chloride    sodium chloride    Allergies as of 12/08/2024    (No Known Allergies)       Social History     Socioeconomic History    Marital status: Single   Tobacco Use    Smoking status: Never     Passive exposure: Never    Smokeless tobacco: Never   Vaping Use    Vaping status: Never Used   Substance and Sexual Activity    Alcohol use: No     Comment: no caffeine     Drug use: No    Sexual activity: Defer       Family History   Problem Relation Age of Onset    Depression Mother     COPD Mother     Mental illness Mother     Diabetes Father     Hypertension Father     Heart disease Father     Hyperlipidemia Father     Cancer Father     Liver cancer Father     Drug abuse Neg Hx     Allergies Neg Hx     Asthma Neg Hx     Stroke Neg Hx     Malig Hyperthermia Neg Hx        REVIEW OF SYSTEMS:   12 point ROS was performed and is negative except as outlined in HPI          Objective:   Temp:  [96.9 °F (36.1 °C)-98 °F (36.7 °C)] 97.9 °F (36.6 °C)  Heart Rate:  [] 116  Resp:  [16-18] 18  BP: ()/(65-93) 114/81  Body mass index is 29.6 kg/m².  Flowsheet Rows      Flowsheet Row First Filed Value   Admission Height 182.9 cm (72\") Documented at 12/08/2024 1637   Admission Weight 97.1 kg (214 lb) Documented at 12/08/2024 1637          Vitals:    12/09/24 0739   BP: 114/81   Pulse: 116   Resp:    Temp: 97.9 °F (36.6 °C)   SpO2: 93%       GEN: no distress, alert and oriented  HEENT: NACT, EOMI, moist mucous membranes  Lungs: CTAB, no wheezes, rales or rhonchi  CV: normal rate, regular rhythm, normal S1, S2, no murmurs, +2 radial pulses b/l, no carotid bruit  Abdomen: soft, nontender, nondistended, NABS  Extremities: no edema  Skin: no rash, warm, dry  Heme/Lymph: no bruising  Psych: organized thought, normal behavior and affect      Lab Review:   Results from " last 7 days   Lab Units 12/09/24  0656 12/08/24  1732   SODIUM mmol/L 140 138   POTASSIUM mmol/L 4.1 4.3   CHLORIDE mmol/L 109* 106   CO2 mmol/L 20.8* 19.8*   BUN mg/dL 29* 30*   CREATININE mg/dL 1.16 1.28*   CALCIUM mg/dL 8.4* 8.7   BILIRUBIN mg/dL  --  0.3   ALK PHOS U/L  --  67   ALT (SGPT) U/L  --  25   AST (SGOT) U/L  --  26   GLUCOSE mg/dL 92 159*     Results from last 7 days   Lab Units 12/08/24  1936 12/08/24  1732   HSTROP T ng/L 39* 41*     Results from last 7 days   Lab Units 12/09/24  0656 12/08/24  1732   WBC 10*3/mm3 12.50* 12.95*   HEMOGLOBIN g/dL 13.5 14.5   HEMATOCRIT % 41.6 44.7   PLATELETS 10*3/mm3 271 307     Results from last 7 days   Lab Units 12/08/24  1732   INR  1.06   APTT seconds 28.0     Results from last 7 days   Lab Units 12/08/24  1732   MAGNESIUM mg/dL 2.3         I personally viewed and interpreted the patient's EKG/Telemetry data)      Atrial fibrillation    Hypertension    Malignant neoplasm metastatic to both lungs    Cancer related pain    Acute pulmonary embolism    Dehydration    Assessment and Plan:  #PAF  #Small PE  #Metastatic melanoma  #Hypertension    64-year-old man with a history of A-fib, hypertension, metastatic melanoma who presented with tachycardia since October, found to be in A-fib.  CT of the chest also revealed a very small nonocclusive PE in the right lower lobe segmental artery.    - Echocardiogram  - Start metoprolol tartrate 25 mg twice daily  - If echo is normal can transition Lovenox to Eliquis 5 mg twice daily and discharge home with outpatient follow-up    Aguilar Lopez MD, [facility], [facility]  12/09/24  08:20 EST.

## 2024-12-09 NOTE — ED NOTES
Nursing report ED to floor  Renzo Streeter  64 y.o.  male    HPI :   Chief Complaint   Patient presents with    new onset afib       Admitting doctor:   No admitting provider for patient encounter.    Admitting diagnosis:   The primary encounter diagnosis was Atrial fibrillation with rapid ventricular response. Diagnoses of Malignant neoplasm of liver, unspecified liver malignancy type: With metastatic disease to lung, bone and Single subsegmental pulmonary embolism without acute cor pulmonale were also pertinent to this visit.    Code status:   Current Code Status       Date Active Code Status Order ID Comments User Context       Prior            Allergies:   Patient has no known allergies.    Isolation:   No active isolations    Intake and Output  No intake or output data in the 24 hours ending 12/08/24 2032    Weight:       12/08/24  1637   Weight: 97.1 kg (214 lb)       Most recent vitals:   Vitals:    12/08/24 1730 12/08/24 1800 12/08/24 1929 12/08/24 1930   BP: 117/93 114/81     Pulse: (!) 124 (!) 126 99 100   Resp:       Temp:       SpO2: 95% 90% 97% 98%   Weight:       Height:           Active LDAs/IV Access:   Lines, Drains & Airways       Active LDAs       Name Placement date Placement time Site Days    Peripheral IV 12/08/24 1733 Anterior;Right Forearm 12/08/24 1733  Forearm  less than 1    Single Lumen Implantable Port 08/23/24 Right Chest 08/23/24  --  unknown  Chest  107                    Labs (abnormal labs have a star):   Labs Reviewed   COMPREHENSIVE METABOLIC PANEL - Abnormal; Notable for the following components:       Result Value    Glucose 159 (*)     BUN 30 (*)     Creatinine 1.28 (*)     CO2 19.8 (*)     All other components within normal limits    Narrative:     GFR Normal >60  Chronic Kidney Disease <60  Kidney Failure <15     CBC WITH AUTO DIFFERENTIAL - Abnormal; Notable for the following components:    WBC 12.95 (*)     Neutrophils, Absolute 8.23 (*)     Monocytes, Absolute 1.02 (*)      Eosinophils, Absolute 0.46 (*)     All other components within normal limits   TROPONIN - Abnormal; Notable for the following components:    HS Troponin T 41 (*)     All other components within normal limits    Narrative:     High Sensitive Troponin T Reference Range:  <14.0 ng/L- Negative Female for AMI  <22.0 ng/L- Negative Male for AMI  >=14 - Abnormal Female indicating possible myocardial injury.  >=22 - Abnormal Male indicating possible myocardial injury.   Clinicians would have to utilize clinical acumen, EKG, Troponin, and serial changes to determine if it is an Acute Myocardial Infarction or myocardial injury due to an underlying chronic condition.        URINALYSIS W/ MICROSCOPIC IF INDICATED (NO CULTURE) - Abnormal; Notable for the following components:    Appearance, UA Cloudy (*)     All other components within normal limits    Narrative:     Urine microscopic not indicated.   HIGH SENSITIVITIY TROPONIN T 2HR - Abnormal; Notable for the following components:    HS Troponin T 39 (*)     All other components within normal limits    Narrative:     High Sensitive Troponin T Reference Range:  <14.0 ng/L- Negative Female for AMI  <22.0 ng/L- Negative Male for AMI  >=14 - Abnormal Female indicating possible myocardial injury.  >=22 - Abnormal Male indicating possible myocardial injury.   Clinicians would have to utilize clinical acumen, EKG, Troponin, and serial changes to determine if it is an Acute Myocardial Infarction or myocardial injury due to an underlying chronic condition.        PROTIME-INR - Normal   APTT - Normal   TSH - Normal   T4, FREE - Normal   MAGNESIUM - Normal   BNP (IN-HOUSE) - Normal    Narrative:     This assay is used as an aid in the diagnosis of individuals suspected of having heart failure. It can be used as an aid in the diagnosis of acute decompensated heart failure (ADHF) in patients presenting with signs and symptoms of ADHF to the emergency department (ED). In addition,  NT-proBNP of <300 pg/mL indicates ADHF is not likely.    Age Range Result Interpretation  NT-proBNP Concentration (pg/mL:      <50             Positive            >450                   Gray                 300-450                    Negative             <300    50-75           Positive            >900                  Gray                300-900                  Negative            <300      >75             Positive            >1800                  Gray                300-1800                  Negative            <300   RAINBOW DRAW    Narrative:     The following orders were created for panel order [place] Draw.  Procedure                               Abnormality         Status                     ---------                               -----------         ------                     Green Top (Gel)[[id number]]                                  Final result               Lavender Top[[id number]]                                     Final result               Gold Top - SST[[id number]]                                   Final result               Light Blue Top[[id number]]                                   Final result                 Please view results for these tests on the individual orders.   TSH RFX ON ABNORMAL TO FREE T4   CBC AND DIFFERENTIAL    Narrative:     The following orders were created for panel order CBC & Differential.  Procedure                               Abnormality         Status                     ---------                               -----------         ------                     CBC Auto Differential[[id number]]        Abnormal            Final result                 Please view results for these tests on the individual orders.   GREEN TOP   LAVENDER TOP   GOLD TOP - SST   LIGHT BLUE TOP       EKG:   ECG 12 Lead Rhythm Change   Final Result   HEART MCEP=012  bpm   RR Oceszpcl=549  ms   TN Interval=  ms   P Horizontal Axis=  deg   P Front Axis=  deg   QRSD Interval=89  ms   QT Ylhmlmyf=038  ms    IFvB=396  ms   QRS Axis=39  deg   T Wave Axis=41  deg   - ABNORMAL ECG -   Atrial fibrillation   Since prior tracing atrial fibrillation is new   Electronically Signed By: Royer Jaimes ([facility]) 2024-12-08 18:20:50   Date and Time of Study:2024-12-08 16:47:31          Meds given in ED:   Medications   sodium chloride 0.9 % flush 10 mL (has no administration in time range)   metoprolol tartrate (LOPRESSOR) tablet 25 mg (25 mg Oral Given 12/8/24 1940)   allopurinol (ZYLOPRIM) tablet 100 mg (has no administration in time range)   amLODIPine (NORVASC) tablet 10 mg (has no administration in time range)   gabapentin (NEURONTIN) capsule 100 mg (has no administration in time range)   hydroCHLOROthiazide tablet 12.5 mg (has no administration in time range)   HYDROcodone-acetaminophen (NORCO) 5-325 MG per tablet 1 tablet (has no administration in time range)   hydrOXYzine (ATARAX) tablet 25 mg (has no administration in time range)   lisinopril (PRINIVIL,ZESTRIL) tablet 40 mg (has no administration in time range)   tamsulosin (FLOMAX) 24 hr capsule 0.4 mg (has no administration in time range)   sodium chloride 0.9 % bolus 500 mL (has no administration in time range)   sodium chloride 0.9 % flush 10 mL (has no administration in time range)   sodium chloride 0.9 % flush 10 mL (has no administration in time range)   sodium chloride 0.9 % infusion 40 mL (has no administration in time range)   sennosides-docusate (PERICOLACE) 8.6-50 MG per tablet 2 tablet (has no administration in time range)     And   polyethylene glycol (MIRALAX) packet 17 g (has no administration in time range)     And   bisacodyl (DULCOLAX) EC tablet 5 mg (has no administration in time range)     And   bisacodyl (DULCOLAX) suppository 10 mg (has no administration in time range)   melatonin tablet 5 mg (has no administration in time range)   acetaminophen (TYLENOL) tablet 650 mg (has no administration in time range)     Or   acetaminophen (TYLENOL) 160  MG/5ML oral solution 650 mg (has no administration in time range)     Or   acetaminophen (TYLENOL) suppository 650 mg (has no administration in time range)   ondansetron ODT (ZOFRAN-ODT) disintegrating tablet 4 mg (has no administration in time range)     Or   ondansetron (ZOFRAN) injection 4 mg (has no administration in time range)   iopamidol (ISOVUE-370) 76 % injection 100 mL (95 mL Intravenous Given by Other 12/8/24 1836)   metoprolol tartrate (LOPRESSOR) injection 5 mg (5 mg Intravenous Given 12/8/24 1902)   Enoxaparin Sodium (LOVENOX) syringe 100 mg (100 mg Subcutaneous Given 12/8/24 1940)       Imaging results:  No radiology results for the last day    Ambulatory status:   - ambulatory    Social issues:   Social History     Socioeconomic History    Marital status: Single   Tobacco Use    Smoking status: Never     Passive exposure: Never    Smokeless tobacco: Never   Vaping Use    Vaping status: Never Used   Substance and Sexual Activity    Alcohol use: No     Comment: no caffeine     Drug use: No    Sexual activity: Defer       NIH Stroke Scale:       Ed Sal RN  12/08/24 20:32 EST

## 2024-12-09 NOTE — DISCHARGE SUMMARY
"    [facility] Medicine Services  DISCHARGE SUMMARY    Patient Name: Renzo Streeter  : 1960  MRN: [id number]    Date of Admission: 2024  4:55 PM  Date of Discharge: 2024  Primary Care Physician: Eliud Blake MD    Consults       Date and Time Order Name Status Description    2024  8:08 PM Inpatient Cardiology Consult Completed     2024  7:09 PM [facility] (on-call MD unless specified) Details              Hospital Course       Active Hospital Problems    Diagnosis  POA    **Single subsegmental pulmonary embolism without acute cor pulmonale [I26.93]  Yes    PAF (paroxysmal atrial fibrillation) [I48.0]  Yes    Cancer related pain [G89.3]  Yes    Obesity (BMI 30.0-34.9) [E66.811]  Yes    Malignant neoplasm metastatic to both lungs [C78.01, C78.02]  Yes    Secondary cancer of bone [C79.51]  Yes    Cancer, metastatic to liver [C78.7]  Yes    Atrial fibrillation with rapid ventricular response [I48.91]  Yes    Hypertension [I10]  Yes    Chronic pain [G89.29]  Yes      Resolved Hospital Problems    Diagnosis Date Resolved POA    Dehydration [E86.0] 2024 Yes      Chief Complaint   Patient presents with    new onset afib         History of Present Illness as written by the admitting physician on the day of admission  Mr. Streeter is a 64 y.o. past medical history of metastatic melanoma, hypertension presenting with \"I am in atrial fibrillation again\".  Patient reports he had atrial fibrillation 4 to 5 years ago but it resolved and he had not had any problems with that since then.  Has had some intermittent palpitations over the last few days and checked his smart watch which showed that he was in atrial fibrillation.  He had an episode of atrial fibrillation in October and then when he checked his wife who looks like he has been atrial fibrillation since November.  Getting Keytruda for his metastatic melanoma.  Has had a decreased appetite and not eating or drinking well.  No chest pain or " shortness of breath.  CTA chest done in the emergency room showing acute PE.    Hospital Course:  Renzo Streeter is a 64 y.o. male presented to the hospital with palpitation and found he was in atrial fibrillation on his smart watch.  CT scan also discovered patient had pulmonary embolism.  Patient was placed on anticoagulation and tolerated it well.  He was seen by cardiology who recommended he start metoprolol and discharged on anticoagulation.  Patient is eager and adamant to discharge home from the hospital as soon as possible.  His other medical problems are reasonably stable and he will need to follow-up with his primary care provider.  Of note his blood pressure medications were held while on the metoprolol as his blood pressure was a little on the softer side but he had no symptoms.  Recommend he discharge on metoprolol only and hold his thiazide diuretic and ACE inhibitor and follow-up within his primary care in 1 week for blood pressure check to determine if he needs to restart either of these.  I spoke to the cardiologist over the phone who said patient is stable to discharge.  He will need to follow-up in cardiology clinic for continued management of atrial fibrillation.  I did discuss the pulmonary nodule with patient and read him the radiologist report and he agrees to follow-up with his oncologist regarding this as patient has known malignancy which she follows regularly with his oncologist    At the time of discharge patient was told to take all medications as prescribed, keep all follow-up appointments, and call their doctor or return to the hospital with any worsening or concerning symptoms.    Please note that this note was made using Dragon voice recognition software            Day of Discharge     Subjective: Patient states he is pretty adamant he wants to discharge home today as soon as possible.  He is feeling better.  Palpitations have stopped.  He denies any lightheadedness or other new  "complaints.    No current fevers or chills  No current shortness of breath or cough  No current nausea, vomiting, or diarrhea  No current chest pain or palpitations      Vital Signs:   Temp:  [96.9 °F (36.1 °C)-98 °F (36.7 °C)] 97.8 °F (36.6 °C)  Heart Rate:  [] 95  Resp:  [16-18] 18  BP: ()/(60-93) 100/60     Physical Exam:  Constitutional: Awake, alert  HENT: NCAT, mucous membranes moist, neck supple  Respiratory: No cough or wheezes, normal respirations, nonlabored breathing   Cardiovascular: Pulse rate is normal, palpable radial pulses  Gastrointestinal:  Soft, nontender, nondistended  Musculoskeletal: Normal musculature for age, no lower extremity edema, BMI 29  Psychiatric: Appropriate affect, cooperative, conversational  Neurologic: No slurred speech or facial droop, follows commands  Skin: No rashes or jaundice, warm      Pertinent  and/or Most Recent Results     Results from last 7 days   Lab Units 12/09/24  0656 12/08/24  1732   WBC 10*3/mm3 12.50* 12.95*   HEMOGLOBIN g/dL 13.5 14.5   HEMATOCRIT % 41.6 44.7   PLATELETS 10*3/mm3 271 307   SODIUM mmol/L 140 138   POTASSIUM mmol/L 4.1 4.3   CHLORIDE mmol/L 109* 106   CO2 mmol/L 20.8* 19.8*   BUN mg/dL 29* 30*   CREATININE mg/dL 1.16 1.28*   GLUCOSE mg/dL 92 159*   CALCIUM mg/dL 8.4* 8.7     Results from last 7 days   Lab Units 12/08/24  1732   BILIRUBIN mg/dL 0.3   ALK PHOS U/L 67   ALT (SGPT) U/L 25   AST (SGOT) U/L 26   PROTIME Seconds 14.0   INR  1.06   APTT seconds 28.0           Invalid input(s): \"TG\", \"LDLCALC\", \"LDLREALC\"  Results from last 7 days   Lab Units 12/08/24  1936 12/08/24  1732   TSH uIU/mL 2.070 2.180   PROBNP pg/mL  --  771.0   HSTROP T ng/L 39* 41*       Brief Urine Lab Results  (Last result in the past 365 days)        Color   Clarity   Blood   Leuk Est   Nitrite   Protein   CREAT   Urine HCG        12/08/24 1823 Yellow   Cloudy   Negative   Negative   Negative   Negative                 Imaging Results (All)       Procedure " Component Value Units Date/Time    CT Angiogram Chest [[id number]] Collected: 12/08/24 1908     Updated: 12/08/24 1908    Narrative:      CT ANGIOGRAM OF THE CHEST. MULTIPLE CORONAL, SAGITTAL, AND 3-D  RECONSTRUCTIONS.     HISTORY: 64-year-old male with atrial fibrillation. Recently diagnosed  with COVID. Metastatic uveal melanoma. Lung, liver, and bone metastases.     TECHNIQUE: Radiation dose reduction techniques were utilized, including  automated exposure control and exposure modulation based on body size.   CT angiogram of the chest was performed with 2mm images following the  administration of IV contrast. Multiple coronal, sagittal, and 3-D  reconstruction images were obtained. Compared with CT sequence of PET/CT  11/12/2024.     FINDINGS:  1. There is a very small nonocclusive pulmonary thromboembolus at a  right lower lobe segmental pulmonary artery extending into a  subsegmental branch. There are no other definitive pulmonary  thromboemboli. RV to LV ratio is less than 1.     2. The 1.3 cm pleural nodule at the right lower lobe is stable. There  are multiple bilateral pulmonary nodules which are without significant  change since the PET/CT given constraints of extensive breathing  artifact on the PET/CT. There are no pleural or pericardial effusions.                          Results for orders placed during the hospital encounter of 08/29/18    Adult Transthoracic Echo Complete W/ Cont if Necessary Per Protocol    Interpretation Summary  · Left ventricular systolic function is normal. Calculated EF = 57%. Estimated EF was in agreement with the calculated EF. Estimated EF = 57%. Normal left ventricular cavity size and wall thickness noted. All left ventricular wall segments contract normally. Left ventricular diastolic function was indeterminate.  · Trace tricuspid valve regurgitation is present. Estimated right ventricular systolic pressure from tricuspid regurgitation is mildly elevated (35-45 mmHg).  Calculated right ventricular systolic pressure from tricuspid regurgitation is 36 mmHg.        Discharge Details        Discharge Medications        New Medications        Instructions Start Date   acetaminophen 325 MG tablet  Commonly known as: TYLENOL   650 mg, Oral, Every 6 Hours PRN      Eliquis DVT/PE Starter Pack tablet therapy pack  Generic drug: Apixaban Starter Pack   5 mg, Oral, See Admin Instructions      metoprolol tartrate 25 MG tablet  Commonly known as: LOPRESSOR   25 mg, Oral, Every 12 Hours Scheduled             Changes to Medications        Instructions Start Date   tamsulosin 0.4 MG capsule 24 hr capsule  Commonly known as: FLOMAX  What changed: when to take this   0.4 mg, Oral, 2 Times Daily             Continue These Medications        Instructions Start Date   albuterol sulfate  (90 Base) MCG/ACT inhaler  Commonly known as: PROVENTIL HFA;VENTOLIN HFA;PROAIR HFA   2 puffs, Inhalation, Every 4 Hours PRN      allopurinol 100 MG tablet  Commonly known as: ZYLOPRIM   100 mg, 2 Times Daily      amLODIPine 10 MG tablet  Commonly known as: NORVASC   1 tablet, Daily      cholecalciferol 25 MCG (1000 UT) tablet  Commonly known as: VITAMIN D3   1,000 Units, Daily      diazePAM 5 MG tablet  Commonly known as: VALIUM   TAKE 1 TO 2 TABLETS BY MOUTH 30 MINUTES PRIOR TO MRI      fish oil 1000 MG capsule capsule   1,000 mg, Daily With Breakfast      gabapentin 100 MG capsule  Commonly known as: NEURONTIN   100 mg, 3 Times Daily      HYDROcodone-acetaminophen 5-325 MG per tablet  Commonly known as: NORCO   1 tablet, Oral, Every 8 Hours PRN      hydrOXYzine 25 MG tablet  Commonly known as: ATARAX   25 mg, Oral, 3 Times Daily PRN      lidocaine-prilocaine 2.5-2.5 % cream  Commonly known as: EMLA   Apply nickel-sized amount over Mediport site 30 min prior to access. Do not rub in.      ondansetron 8 MG tablet  Commonly known as: ZOFRAN   8 mg      predniSONE 10 MG tablet  Commonly known as: DELTASONE   10  mg, Oral, Daily      VITAMIN B-12 PO   Take  by mouth.             Stop These Medications      hydroCHLOROthiazide 12.5 MG tablet     lisinopril 40 MG tablet  Commonly known as: PRINIVIL,ZESTRIL            Please note Flomax dose was not changed.  This is reportedly patient's home dose.    No Known Allergies      Discharge Disposition:  Home or Self Care    Diet:  Hospital:  Diet Order   Procedures    Diet: Regular/House; Fluid Consistency: Thin (IDDSI 0)       Activity:  Activity Instructions       Activity as Tolerated      Up WIth Assist                       Future Appointments   Date Time Provider Department Center   12/13/2024  9:15 AM [facility]  [facility]   12/13/2024 10:00 AM Lilia Hunt APRN [facility]   12/13/2024 10:30 AM CHAIR 2A MARY Shaw [facility]  [facility]   1/3/2025 10:00 AM [facility]  [facility]   1/3/2025 10:20 AM Jasmine Ellis MD PhD [facility]   1/3/2025 10:45 AM CHAIR 7B MARY Shaw MD  [facility]             Additional Instructions for the Follow-ups that You Need to Schedule       Discharge Follow-up with PCP   As directed       Currently Documented PCP:    Eliud Blake MD    PCP Phone Number:    [phone]     Follow Up Details: Recommend primary care provider follow-up within 1 week for general hospital follow-up        Discharge Follow-up with Specified Provider: Follow-up with [facility] for atrial fibrillation; 1 Month   As directed      To: Follow-up with [facility] for atrial fibrillation   Follow Up: 1 Month        Discharge Follow-up with Specified Provider: Follow-up with your oncologist as previously instructed.  Please call your oncologist with any questions   As directed      To: Follow-up with your oncologist as previously instructed.  Please call your oncologist with any questions               Follow-up Information       Aguilar Hines MD Follow up in 1 month(s).    Specialty:  Cardiology  Why: Recommend follow-up with [facility] for atrial fibrillation.  Please call to schedule  Contact information:  [address]  [address]  [facility] [id number] [phone]               Eliud Blake MD .    Specialty: Family Medicine  Why: Recommend primary care provider follow-up within 1 week for general hospital follow-up  Contact information:  [address]  [facility] [address]  [phone]                                 Eliud Sharp MD  12/09/24      Time Spent on Discharge:  I spent greater than 40 minutes on this discharge activity which included: face-to-face encounter with the patient, reviewing the data in the system, coordination of the care with the nursing staff as well as consultants, documentation, and entering orders.

## 2024-12-09 NOTE — PLAN OF CARE
Goal Outcome Evaluation:  Plan of Care Reviewed With: patient        Progress: no change  Outcome Evaluation: vss, no complaints of pain, Iv fluid bolus given. pt remains in afib, pt rested off and on during shift, labs in am. consult to cards placed.

## 2024-12-09 NOTE — H&P
"    Patient Name:  Renzo Streeter  YOB: 1960  MRN:  [id number]  Admit Date:  12/8/2024  Patient Care Team:  Eliud Blake MD as PCP - General  Lilia Landis RN as Nurse Navigator  Patel Riggs MD as Referring Physician (Thoracic Surgery)  Jasmine Ellis MD PhD as Consulting Physician (Hematology and Oncology)      Subjective   History Present Illness     Chief Complaint   Patient presents with    new onset afib       History of Present Illness  Mr. Streeter is a 64 y.o. past medical history of metastatic melanoma, hypertension presenting with \"I am in atrial fibrillation again\".  Patient reports he had atrial fibrillation 4 to 5 years ago but it resolved and he had not had any problems with that since then.  Has had some intermittent palpitations over the last few days and checked his smart watch which showed that he was in atrial fibrillation.  He had an episode of atrial fibrillation in October and then when he checked his wife who looks like he has been atrial fibrillation since November.  Getting Keytruda for his metastatic melanoma.  Has had a decreased appetite and not eating or drinking well.  No chest pain or shortness of breath.  CTA chest done in the emergency room showing acute PE.    Review of Systems   Constitutional:  Negative for chills and fever.   HENT:  Negative for rhinorrhea and sneezing.    Respiratory:  Negative for cough and shortness of breath.    Cardiovascular:  Positive for palpitations. Negative for chest pain and leg swelling.   Gastrointestinal:  Negative for abdominal pain, constipation, diarrhea and nausea.   Neurological:  Negative for dizziness and light-headedness.        Personal History     Past Medical History:   Diagnosis Date    Acute renal failure (ARF) 09/2018    RESOLVED AFTER DEHYDRATION RESOLVED    Anemia     Arthritis     OA AND RA    Asthma     Atrial fibrillation with rapid ventricular response     Bone cancer     BPH (benign prostatic " "hyperplasia)     Chronic back pain     CKD (chronic kidney disease)     Stage 3    COPD (chronic obstructive pulmonary disease)     Coronary artery calcification seen on CAT scan     Eye cancer, left     TREATMENT WITH RADIATION. SOME VISON LOSS LEFT EYE    Gout     Hepatitis C     IN REMISSION. BEEN LONGER THAN 5 YRS    Hiatal hernia     History of atrial fibrillation     SEPT 2018. WITH SEVERE DEHYDRATION    History of blood transfusion 1978    in [place], no known reaction mild shortness of breath    Hypertension     Liver cancer     Lung cancer     MRSA infection     HX OF    Pneumonia     Psoriasis     Right hip pain     Short of breath on exertion     Sleep apnea     \"MILD\". DID NOT FOLLOW THUR GETTING MACHINE RELATED TO COST OF MACHINE    Syncope 09/2018    secondary to atrial fibrillation with rapid ventricular response. SEVERELY DEHYDRATED AT THIS TIME.    Viral hepatitis     Vision loss of left eye     EYE CANCER    Vitamin D deficiency      Past Surgical History:   Procedure Laterality Date    BACK SURGERY      LUMBAR DISC X2. NO HARDWARE    CARPAL TUNNEL RELEASE WITH CUBITAL TUNNEL RELEASE Right     COLONOSCOPY      COLONOSCOPY N/A 07/30/2021    Procedure: COLONOSCOPY;  Surgeon: Flor Maciel MD;  Location: [facility] MAIN OR;  Service: Gastroenterology;  Laterality: N/A;  Diverticulosis    EYE SURGERY Left     Left Eye    HERNIA REPAIR  2016    LUMBAR SPINE SURGERY      x3 in 2000. 2010, 2011    REVISION TOTAL KNEE ARTHROPLASTY Right     TOTAL HIP ARTHROPLASTY Right 05/28/2019    Procedure: RIGHT TOTAL HIP ARTHROPLASTY;  Surgeon: Harish Dominguez MD;  Location: [facility] MAIN OR;  Service: Orthopedics    TOTAL KNEE ARTHROPLASTY Right     TOTAL KNEE ARTHROPLASTY Left 04/25/2024    Procedure: TOTAL KNEE ARTHROPLASTY;  Surgeon: Harish Dominguez MD;  Location: [facility] OR [facility];  Service: Orthopedics;  Laterality: Left;    VENOUS ACCESS DEVICE (PORT) INSERTION Right 8/23/2024    Procedure: INSERTION VENOUS " ACCESS DEVICE;  Surgeon: Sal Wilhelm MD;  Location: [facility] MAIN OR;  Service: General;  Laterality: Right;     Family History   Problem Relation Age of Onset    Depression Mother     COPD Mother     Mental illness Mother     Diabetes Father     Hypertension Father     Heart disease Father     Hyperlipidemia Father     Cancer Father     Liver cancer Father     Drug abuse Neg Hx     Allergies Neg Hx     Asthma Neg Hx     Stroke Neg Hx     Malig Hyperthermia Neg Hx      Social History     Tobacco Use    Smoking status: Never     Passive exposure: Never    Smokeless tobacco: Never   Vaping Use    Vaping status: Never Used   Substance Use Topics    Alcohol use: No     Comment: no caffeine     Drug use: No     No current facility-administered medications on file prior to encounter.     Current Outpatient Medications on File Prior to Encounter   Medication Sig Dispense Refill    albuterol (PROVENTIL HFA;VENTOLIN HFA) 108 (90 BASE) MCG/ACT inhaler Inhale 2 puffs every 4 (four) hours as needed for wheezing. 1 inhaler 3    allopurinol (ZYLOPRIM) 100 MG tablet Take 1 tablet by mouth 2 (Two) Times a Day.      amLODIPine (NORVASC) 10 MG tablet Take 1 tablet by mouth Daily.  6    cholecalciferol (VITAMIN D3) 25 MCG (1000 UT) tablet Take 1 tablet by mouth Daily.      Cyanocobalamin (VITAMIN B-12 PO) Take  by mouth.      gabapentin (NEURONTIN) 100 MG capsule Take 1 capsule by mouth 3 (Three) Times a Day.      hydrochlorothiazide (HYDRODIURIL) 12.5 MG tablet Take 1 tablet by mouth Daily.  1    HYDROcodone-acetaminophen (NORCO) 5-325 MG per tablet Take 1 tablet by mouth Every 8 (Eight) Hours As Needed for Moderate Pain. 60 tablet 0    hydrOXYzine (ATARAX) 25 MG tablet Take 1 tablet by mouth 3 (Three) Times a Day As Needed for Itching. 90 tablet 2    lidocaine-prilocaine (EMLA) 2.5-2.5 % cream Apply nickel-sized amount over Mediport site 30 min prior to access. Do not rub in. 30 g 2    lisinopril (PRINIVIL,ZESTRIL) 40 MG  tablet Take 1 tablet by mouth Daily.  6    Omega-3 Fatty Acids (fish oil) 1000 MG capsule capsule Take 1 capsule by mouth Daily With Breakfast. HOLD PER MD INSTR      ondansetron (ZOFRAN) 8 MG tablet Take 1 tablet by mouth.      predniSONE (DELTASONE) 10 MG tablet Take 1 tablet by mouth Daily. 30 tablet 2    tamsulosin (FLOMAX) 0.4 MG capsule 24 hr capsule TAKE 1 CAPSULE EVERY DAY (Patient taking differently: 1 capsule 2 (Two) Times a Day.) 90 capsule 0    diazePAM (VALIUM) 5 MG tablet TAKE 1 TO 2 TABLETS BY MOUTH 30 MINUTES PRIOR TO MRI      [DISCONTINUED] Enbrel SureClick 50 MG/ML solution auto-injector Inject 50 mg under the skin into the appropriate area as directed 1 (One) Time Per Week. (Patient not taking: Reported on 11/19/2024)       No Known Allergies    Objective    Objective     Vital Signs  Temp:  [96.9 °F (36.1 °C)-98 °F (36.7 °C)] 98 °F (36.7 °C)  Heart Rate:  [] 75  Resp:  [16-18] 18  BP: (100-117)/(70-93) 104/71  SpO2:  [90 %-98 %] 98 %  on   ;   Device (Oxygen Therapy): room air  Body mass index is 29.6 kg/m².    Physical Exam  Vitals and nursing note reviewed.   Constitutional:       General: He is not in acute distress.  Cardiovascular:      Rate and Rhythm: Normal rate. Rhythm irregular.   Pulmonary:      Effort: Pulmonary effort is normal. No respiratory distress.   Abdominal:      General: Abdomen is flat. There is no distension.      Tenderness: There is no abdominal tenderness.   Musculoskeletal:         General: No swelling or deformity.   Skin:     General: Skin is warm and dry.   Neurological:      General: No focal deficit present.      Mental Status: He is alert. Mental status is at baseline.         Results Review:  I reviewed the patient's new clinical results.  I reviewed the patient's new imaging results and agree with the interpretation.  I reviewed the patient's other test results and agree with the interpretation  I personally viewed and interpreted the patient's  EKG/Telemetry data  Discussed with ED provider.    Lab Results (last 24 hours)       Procedure Component Value Units Date/Time    Comprehensive Metabolic Panel [[id number]]  (Abnormal) Collected: 12/08/24 1732    Specimen: Blood Updated: 12/08/24 1813     Glucose 159 mg/dL      BUN 30 mg/dL      Creatinine 1.28 mg/dL      Sodium 138 mmol/L      Potassium 4.3 mmol/L      Comment: Slight hemolysis detected by analyzer. Result may be falsely elevated.        Chloride 106 mmol/L      CO2 19.8 mmol/L      Calcium 8.7 mg/dL      Total Protein 6.4 g/dL      Albumin 3.7 g/dL      ALT (SGPT) 25 U/L      AST (SGOT) 26 U/L      Alkaline Phosphatase 67 U/L      Total Bilirubin 0.3 mg/dL      Globulin 2.7 gm/dL      A/G Ratio 1.4 g/dL      BUN/Creatinine Ratio 23.4     Anion Gap 12.2 mmol/L      eGFR 62.1 mL/min/1.73     Narrative:      GFR Normal >60  Chronic Kidney Disease <60  Kidney Failure <15      CBC & Differential [[id number]]  (Abnormal) Collected: 12/08/24 1732    Specimen: Blood Updated: 12/08/24 1749    Narrative:      The following orders were created for panel order CBC & Differential.  Procedure                               Abnormality         Status                     ---------                               -----------         ------                     CBC Auto Differential[[id number]]        Abnormal            Final result                 Please view results for these tests on the individual orders.    CBC Auto Differential [[id number]]  (Abnormal) Collected: 12/08/24 1732    Specimen: Blood Updated: 12/08/24 1749     WBC 12.95 10*3/mm3      RBC 4.96 10*6/mm3      Hemoglobin 14.5 g/dL      Hematocrit 44.7 %      MCV 90.1 fL      MCH 29.2 pg      MCHC 32.4 g/dL      RDW 14.6 %      RDW-SD 48.2 fl      MPV 9.6 fL      Platelets 307 10*3/mm3      Neutrophil % 63.5 %      Lymphocyte % 23.9 %      Monocyte % 7.9 %      Eosinophil % 3.6 %      Basophil % 0.7 %      Immature Grans % 0.4 %      Neutrophils, Absolute  8.23 10*3/mm3      Lymphocytes, Absolute 3.10 10*3/mm3      Monocytes, Absolute 1.02 10*3/mm3      Eosinophils, Absolute 0.46 10*3/mm3      Basophils, Absolute 0.09 10*3/mm3      Immature Grans, Absolute 0.05 10*3/mm3      nRBC 0.0 /100 WBC     Protime-INR [[id number]]  (Normal) Collected: 12/08/24 1732    Specimen: Blood Updated: 12/08/24 1801     Protime 14.0 Seconds      INR 1.06    aPTT [[id number]]  (Normal) Collected: 12/08/24 1732    Specimen: Blood Updated: 12/08/24 1801     PTT 28.0 seconds     TSH [[id number]]  (Normal) Collected: 12/08/24 1732    Specimen: Blood Updated: 12/08/24 1817     TSH 2.180 uIU/mL     T4, Free [[id number]]  (Normal) Collected: 12/08/24 1732    Specimen: Blood Updated: 12/08/24 1817     Free T4 1.34 ng/dL     Magnesium [[id number]]  (Normal) Collected: 12/08/24 1732    Specimen: Blood Updated: 12/08/24 1813     Magnesium 2.3 mg/dL     High Sensitivity Troponin T [[id number]]  (Abnormal) Collected: 12/08/24 1732    Specimen: Blood Updated: 12/08/24 1817     HS Troponin T 41 ng/L     Narrative:      High Sensitive Troponin T Reference Range:  <14.0 ng/L- Negative Female for AMI  <22.0 ng/L- Negative Male for AMI  >=14 - Abnormal Female indicating possible myocardial injury.  >=22 - Abnormal Male indicating possible myocardial injury.   Clinicians would have to utilize clinical acumen, EKG, Troponin, and serial changes to determine if it is an Acute Myocardial Infarction or myocardial injury due to an underlying chronic condition.         BNP [[id number]]  (Normal) Collected: 12/08/24 1732    Specimen: Blood Updated: 12/08/24 1817     proBNP 771.0 pg/mL     Narrative:      This assay is used as an aid in the diagnosis of individuals suspected of having heart failure. It can be used as an aid in the diagnosis of acute decompensated heart failure (ADHF) in patients presenting with signs and symptoms of ADHF to the emergency department (ED). In addition, NT-proBNP of <300 pg/mL indicates ADHF  is not likely.    Age Range Result Interpretation  NT-proBNP Concentration (pg/mL:      <50             Positive            >450                   Gray                 300-450                    Negative             <300    50-75           Positive            >900                  Gray                300-900                  Negative            <300      >75             Positive            >1800                  Gray                300-1800                  Negative            <300    Urinalysis With Microscopic If Indicated (No Culture) - Urine, Clean Catch [[id number]]  (Abnormal) Collected: 12/08/24 1823    Specimen: Urine, Clean Catch Updated: 12/08/24 1833     Color, UA Yellow     Appearance, UA Cloudy     pH, UA 5.5     Specific Gravity, UA 1.021     Glucose, UA Negative     Ketones, UA Negative     Bilirubin, UA Negative     Blood, UA Negative     Protein, UA Negative     Leuk Esterase, UA Negative     Nitrite, UA Negative     Urobilinogen, UA 1.0 E.U./dL    Narrative:      Urine microscopic not indicated.    High Sensitivity Troponin T 2Hr [[id number]]  (Abnormal) Collected: 12/08/24 1936    Specimen: Blood Updated: 12/08/24 2009     HS Troponin T 39 ng/L      Troponin T Delta -2 ng/L     Narrative:      High Sensitive Troponin T Reference Range:  <14.0 ng/L- Negative Female for AMI  <22.0 ng/L- Negative Male for AMI  >=14 - Abnormal Female indicating possible myocardial injury.  >=22 - Abnormal Male indicating possible myocardial injury.   Clinicians would have to utilize clinical acumen, EKG, Troponin, and serial changes to determine if it is an Acute Myocardial Infarction or myocardial injury due to an underlying chronic condition.         TSH Rfx On Abnormal To Free T4 [[id number]]  (Normal) Collected: 12/08/24 1936    Specimen: Blood Updated: 12/08/24 2053     TSH 2.070 uIU/mL             Imaging Results (Last 24 Hours)       Procedure Component Value Units Date/Time    CT Angiogram Chest [[id number]]  Collected: 12/08/24 1908     Updated: 12/08/24 1908    Narrative:      CT ANGIOGRAM OF THE CHEST. MULTIPLE CORONAL, SAGITTAL, AND 3-D  RECONSTRUCTIONS.     HISTORY: 64-year-old male with atrial fibrillation. Recently diagnosed  with COVID. Metastatic uveal melanoma. Lung, liver, and bone metastases.     TECHNIQUE: Radiation dose reduction techniques were utilized, including  automated exposure control and exposure modulation based on body size.   CT angiogram of the chest was performed with 2mm images following the  administration of IV contrast. Multiple coronal, sagittal, and 3-D  reconstruction images were obtained. Compared with CT sequence of PET/CT  11/12/2024.     FINDINGS:  1. There is a very small nonocclusive pulmonary thromboembolus at a  right lower lobe segmental pulmonary artery extending into a  subsegmental branch. There are no other definitive pulmonary  thromboemboli. RV to LV ratio is less than 1.     2. The 1.3 cm pleural nodule at the right lower lobe is stable. There  are multiple bilateral pulmonary nodules which are without significant  change since the PET/CT given constraints of extensive breathing  artifact on the PET/CT. There are no pleural or pericardial effusions.                  Results for orders placed during the hospital encounter of 08/29/18    Adult Transthoracic Echo Complete W/ Cont if Necessary Per Protocol    Interpretation Summary  · Left ventricular systolic function is normal. Calculated EF = 57%. Estimated EF was in agreement with the calculated EF. Estimated EF = 57%. Normal left ventricular cavity size and wall thickness noted. All left ventricular wall segments contract normally. Left ventricular diastolic function was indeterminate.  · Trace tricuspid valve regurgitation is present. Estimated right ventricular systolic pressure from tricuspid regurgitation is mildly elevated (35-45 mmHg). Calculated right ventricular systolic pressure from tricuspid regurgitation is  36 mmHg.      ECG 12 Lead Rhythm Change   Final Result   HEART RZEP=581  bpm   RR Cqxeczin=710  ms   WA Interval=  ms   P Horizontal Axis=  deg   P Front Axis=  deg   QRSD Interval=89  ms   QT Tcropgjy=539  ms   KEzU=849  ms   QRS Axis=39  deg   T Wave Axis=41  deg   - ABNORMAL ECG -   Atrial fibrillation   Since prior tracing atrial fibrillation is new   Electronically Signed By: Royer Jaimes ([facility]) 2024-12-08 18:20:50   Date and Time of Study:2024-12-08 16:47:31           Assessment/Plan     Active Hospital Problems    Diagnosis  POA    **Atrial fibrillation [I48.91]  Yes    Acute pulmonary embolism [I26.99]  Yes    Dehydration [E86.0]  Unknown    Cancer related pain [G89.3]  Yes    Malignant neoplasm metastatic to both lungs [C78.01, C78.02]  Yes    Hypertension [I10]  Yes      Resolved Hospital Problems   No resolved problems to display.     New onset atrial fibrillation  -Previous history of atrial fibrillation when he was dehydrated for 5 years ago but none since then  -Start metoprolol, IV metoprolol given in the emergency room  -Lovenox for now  -Check TSH, echo  -Cardiology consulted    Acute pulmonary embolism  -CTA with nonocclusive pulmonary thromboembolus of the right lower lobe segmental pulmonary artery extending to the subsegmental branch, RV to LV Loescher less than 1  -Patient on room air comfortably breathing without any shortness of breath  -Lovenox    Dehydration  Creatinine elevation  -IV fluids.  Continue amlodipine.  Hold hydrochlorothiazide and lisinopril for now    BPH-continue home Flomax    Metastatic melanoma to liver, bilateral lungs, bones  -Follows with CBC, on Keytruda and Zometa  -Continue home pain medication  I discussed the patient's findings and my recommendations with patient and ED provider.    VTE Prophylaxis - Pharmacy to dose Lovenox.  Code Status - Full code.       Levy Aguilar MD  [facility]  12/08/24  22:00 EST

## 2024-12-09 NOTE — PROGRESS NOTES
"[facility] Clinical Pharmacy Services: Enoxaparin Consult    Renzo Streeter has a pharmacy consult to dose full-dose enoxaparin per Dr Aguilar's request.     Indication: DVT/PE (active thrombosis)  Home Anticoagulation: none noted on surescripts reivew     Relevant clinical data and objective history reviewed:  64 y.o. male 182.9 cm (72\") 99 kg (218 lb 4.1 oz)   Body mass index is 29.6 kg/m².   Results from last 7 days   Lab Units 12/08/24  1732   PLATELETS 10*3/mm3 307     Estimated Creatinine Clearance: 70.1 mL/min (A) (by C-G formula based on SCr of 1.28 mg/dL (H)).    Assessment/Plan    Will start patient on  100 (1mg/kg) subcutaneous every 12 hours, adjusted for renal function. Consult order will be discontinued but pharmacy will continue to follow.     Shine Bennett, [facility]  Clinical Pharmacist    "

## 2024-12-10 ENCOUNTER — READMISSION MANAGEMENT (OUTPATIENT)
Dept: CALL CENTER | Facility: HOSPITAL | Age: 64
End: 2024-12-10
Payer: MEDICARE

## 2024-12-10 NOTE — OUTREACH NOTE
Prep Survey      Flowsheet Row Responses   [facility] facility patient discharged from? [place]   Is LACE score < 7 ? No   Eligibility Readm Mgmt   Discharge diagnosis Single subsegmental pulmonary embolism without acute cor pulmonale   Does the patient have one of the following disease processes/diagnoses(primary or secondary)? Other   Does the patient have Home health ordered? No   Is there a DME ordered? No   Medication alerts for this patient see avs--eliquis   Prep survey completed? Yes            Sade MARLOW - Registered Nurse

## 2024-12-12 ENCOUNTER — READMISSION MANAGEMENT (OUTPATIENT)
Dept: CALL CENTER | Facility: HOSPITAL | Age: 64
End: 2024-12-12
Payer: MEDICARE

## 2024-12-12 NOTE — OUTREACH NOTE
Medical Week 1 Survey      Flowsheet Row Responses   [facility] patient discharged from? [place]   Does the patient have one of the following disease processes/diagnoses(primary or secondary)? Other   Week 1 attempt successful? Yes   Call start time 1051   Call end time 1052   Discharge diagnosis Single subsegmental pulmonary embolism without acute cor pulmonale   Person spoke with today (if not patient) and relationship patient   Meds reviewed with patient/caregiver? Yes   Is the patient having any side effects they believe may be caused by any medication additions or changes? No   Does the patient have all medications ordered at discharge? Yes   Is the patient taking all medications as directed (includes completed medication regime)? Yes   Does the patient have a primary care provider?  Yes   Does the patient have an appointment with their PCP within 7 days of discharge? Yes   Has the patient kept scheduled appointments due by today? Yes   Psychosocial issues? No   Did the patient receive a copy of their discharge instructions? Yes   Nursing interventions Reviewed instructions with patient   What is the patient's perception of their health status since discharge? Improving   Is the patient/caregiver able to teach back signs and symptoms related to disease process for when to call PCP? Yes   Is the patient/caregiver able to teach back signs and symptoms related to disease process for when to call 911? Yes   Is the patient/caregiver able to teach back the hierarchy of who to call/visit for symptoms/problems? PCP, Specialist, Home health nurse, Urgent Care, ED, 911 Yes   If the patient is a current smoker, are they able to teach back resources for cessation? Not a smoker   Week 1 call completed? Yes   Graduated Yes   Would this patient benefit from a Referral to Amb Social Work? No   Is the patient interested in additional calls from an ambulatory ? No   Call end time 1052            Dejon Shaw  Registered Nurse

## 2024-12-13 ENCOUNTER — INFUSION (OUTPATIENT)
Dept: ONCOLOGY | Facility: HOSPITAL | Age: 64
End: 2024-12-13
Payer: MEDICARE

## 2024-12-13 ENCOUNTER — OFFICE VISIT (OUTPATIENT)
Dept: ONCOLOGY | Facility: CLINIC | Age: 64
End: 2024-12-13
Payer: MEDICARE

## 2024-12-13 VITALS
SYSTOLIC BLOOD PRESSURE: 145 MMHG | RESPIRATION RATE: 16 BRPM | DIASTOLIC BLOOD PRESSURE: 83 MMHG | TEMPERATURE: 97.5 F | BODY MASS INDEX: 29.97 KG/M2 | WEIGHT: 221.3 LBS | OXYGEN SATURATION: 97 % | HEIGHT: 72 IN | HEART RATE: 73 BPM

## 2024-12-13 DIAGNOSIS — C69.40 MELANOMA OF UVEA METASTATIC TO LIVER: ICD-10-CM

## 2024-12-13 DIAGNOSIS — Z79.899 HIGH RISK MEDICATION USE: ICD-10-CM

## 2024-12-13 DIAGNOSIS — C78.7 MELANOMA OF UVEA METASTATIC TO LIVER: ICD-10-CM

## 2024-12-13 DIAGNOSIS — C79.51 SECONDARY CANCER OF BONE: ICD-10-CM

## 2024-12-13 DIAGNOSIS — C78.7 CANCER, METASTATIC TO LIVER: ICD-10-CM

## 2024-12-13 DIAGNOSIS — C78.7 CANCER, METASTATIC TO LIVER: Primary | ICD-10-CM

## 2024-12-13 DIAGNOSIS — C78.7 MELANOMA OF UVEA METASTATIC TO LIVER: Primary | ICD-10-CM

## 2024-12-13 DIAGNOSIS — Z79.899 ENCOUNTER FOR LONG-TERM (CURRENT) USE OF HIGH-RISK MEDICATION: ICD-10-CM

## 2024-12-13 DIAGNOSIS — I26.94 MULTIPLE SUBSEGMENTAL PULMONARY EMBOLI WITHOUT ACUTE COR PULMONALE: ICD-10-CM

## 2024-12-13 DIAGNOSIS — C69.32 MALIGNANT NEOPLASM OF LEFT CHOROID: ICD-10-CM

## 2024-12-13 DIAGNOSIS — C69.40 MELANOMA OF UVEA METASTATIC TO LIVER: Primary | ICD-10-CM

## 2024-12-13 DIAGNOSIS — Z45.2 FITTING AND ADJUSTMENT OF VASCULAR CATHETER: ICD-10-CM

## 2024-12-13 DIAGNOSIS — C69.92 MALIGNANT NEOPLASM OF LEFT EYE: ICD-10-CM

## 2024-12-13 LAB
ALBUMIN SERPL-MCNC: 3.9 G/DL (ref 3.5–5.2)
ALBUMIN/GLOB SERPL: 1.3 G/DL
ALP SERPL-CCNC: 71 U/L (ref 39–117)
ALT SERPL W P-5'-P-CCNC: 30 U/L (ref 1–41)
ANION GAP SERPL CALCULATED.3IONS-SCNC: 12 MMOL/L (ref 5–15)
AST SERPL-CCNC: 26 U/L (ref 1–40)
BASOPHILS # BLD AUTO: 0.03 10*3/MM3 (ref 0–0.2)
BASOPHILS NFR BLD AUTO: 0.2 % (ref 0–1.5)
BILIRUB SERPL-MCNC: 0.2 MG/DL (ref 0–1.2)
BUN SERPL-MCNC: 21 MG/DL (ref 8–23)
BUN/CREAT SERPL: 20.8 (ref 7–25)
CALCIUM SPEC-SCNC: 9.2 MG/DL (ref 8.6–10.5)
CHLORIDE SERPL-SCNC: 108 MMOL/L (ref 98–107)
CO2 SERPL-SCNC: 19 MMOL/L (ref 22–29)
CREAT SERPL-MCNC: 1.01 MG/DL (ref 0.76–1.27)
DEPRECATED RDW RBC AUTO: 51.4 FL (ref 37–54)
EGFRCR SERPLBLD CKD-EPI 2021: 82.5 ML/MIN/1.73
EOSINOPHIL # BLD AUTO: 0.04 10*3/MM3 (ref 0–0.4)
EOSINOPHIL NFR BLD AUTO: 0.3 % (ref 0.3–6.2)
ERYTHROCYTE [DISTWIDTH] IN BLOOD BY AUTOMATED COUNT: 15.3 % (ref 12.3–15.4)
GLOBULIN UR ELPH-MCNC: 2.9 GM/DL
GLUCOSE SERPL-MCNC: 110 MG/DL (ref 65–99)
HCT VFR BLD AUTO: 41.9 % (ref 37.5–51)
HGB BLD-MCNC: 13.5 G/DL (ref 13–17.7)
IMM GRANULOCYTES # BLD AUTO: 0.05 10*3/MM3 (ref 0–0.05)
IMM GRANULOCYTES NFR BLD AUTO: 0.4 % (ref 0–0.5)
LYMPHOCYTES # BLD AUTO: 1.47 10*3/MM3 (ref 0.7–3.1)
LYMPHOCYTES NFR BLD AUTO: 11.7 % (ref 19.6–45.3)
MAGNESIUM SERPL-MCNC: 2.3 MG/DL (ref 1.6–2.4)
MCH RBC QN AUTO: 29.7 PG (ref 26.6–33)
MCHC RBC AUTO-ENTMCNC: 32.2 G/DL (ref 31.5–35.7)
MCV RBC AUTO: 92.1 FL (ref 79–97)
MONOCYTES # BLD AUTO: 0.76 10*3/MM3 (ref 0.1–0.9)
MONOCYTES NFR BLD AUTO: 6 % (ref 5–12)
NEUTROPHILS NFR BLD AUTO: 10.23 10*3/MM3 (ref 1.7–7)
NEUTROPHILS NFR BLD AUTO: 81.4 % (ref 42.7–76)
NRBC BLD AUTO-RTO: 0 /100 WBC (ref 0–0.2)
PHOSPHATE SERPL-MCNC: 3 MG/DL (ref 2.5–4.5)
PLATELET # BLD AUTO: 278 10*3/MM3 (ref 140–450)
PMV BLD AUTO: 9.7 FL (ref 6–12)
POTASSIUM SERPL-SCNC: 4.7 MMOL/L (ref 3.5–5.2)
PROT SERPL-MCNC: 6.8 G/DL (ref 6–8.5)
RBC # BLD AUTO: 4.55 10*6/MM3 (ref 4.14–5.8)
SODIUM SERPL-SCNC: 139 MMOL/L (ref 136–145)
WBC NRBC COR # BLD AUTO: 12.58 10*3/MM3 (ref 3.4–10.8)

## 2024-12-13 PROCEDURE — 96375 TX/PRO/DX INJ NEW DRUG ADDON: CPT

## 2024-12-13 PROCEDURE — 84100 ASSAY OF PHOSPHORUS: CPT

## 2024-12-13 PROCEDURE — 25010000002 ZOLEDRONIC ACID 4 MG/100ML SOLUTION: Performed by: NURSE PRACTITIONER

## 2024-12-13 PROCEDURE — 25010000002 PEMBROLIZUMAB 100 MG/4ML SOLUTION 4 ML VIAL: Performed by: NURSE PRACTITIONER

## 2024-12-13 PROCEDURE — 25010000002 HEPARIN LOCK FLUSH PER 10 UNITS: Performed by: INTERNAL MEDICINE

## 2024-12-13 PROCEDURE — 85025 COMPLETE CBC W/AUTO DIFF WBC: CPT

## 2024-12-13 PROCEDURE — 96413 CHEMO IV INFUSION 1 HR: CPT

## 2024-12-13 PROCEDURE — 80053 COMPREHEN METABOLIC PANEL: CPT

## 2024-12-13 PROCEDURE — 83735 ASSAY OF MAGNESIUM: CPT

## 2024-12-13 RX ORDER — SODIUM CHLORIDE 9 MG/ML
20 INJECTION, SOLUTION INTRAVENOUS ONCE
Status: DISCONTINUED | OUTPATIENT
Start: 2024-12-13 | End: 2024-12-13 | Stop reason: HOSPADM

## 2024-12-13 RX ORDER — SODIUM CHLORIDE 9 MG/ML
20 INJECTION, SOLUTION INTRAVENOUS ONCE
Status: CANCELLED | OUTPATIENT
Start: 2024-12-13

## 2024-12-13 RX ORDER — SODIUM CHLORIDE 0.9 % (FLUSH) 0.9 %
10 SYRINGE (ML) INJECTION AS NEEDED
OUTPATIENT
Start: 2024-12-13

## 2024-12-13 RX ORDER — ZOLEDRONIC ACID 0.04 MG/ML
4 INJECTION, SOLUTION INTRAVENOUS ONCE
Status: COMPLETED | OUTPATIENT
Start: 2024-12-13 | End: 2024-12-13

## 2024-12-13 RX ORDER — HEPARIN SODIUM (PORCINE) LOCK FLUSH IV SOLN 100 UNIT/ML 100 UNIT/ML
500 SOLUTION INTRAVENOUS AS NEEDED
Status: DISCONTINUED | OUTPATIENT
Start: 2024-12-13 | End: 2024-12-13 | Stop reason: HOSPADM

## 2024-12-13 RX ORDER — SODIUM CHLORIDE 0.9 % (FLUSH) 0.9 %
10 SYRINGE (ML) INJECTION AS NEEDED
Status: DISCONTINUED | OUTPATIENT
Start: 2024-12-13 | End: 2024-12-13 | Stop reason: HOSPADM

## 2024-12-13 RX ORDER — HEPARIN SODIUM (PORCINE) LOCK FLUSH IV SOLN 100 UNIT/ML 100 UNIT/ML
500 SOLUTION INTRAVENOUS AS NEEDED
OUTPATIENT
Start: 2024-12-13

## 2024-12-13 RX ADMIN — Medication 10 ML: at 11:26

## 2024-12-13 RX ADMIN — Medication 500 UNITS: at 11:26

## 2024-12-13 RX ADMIN — SODIUM CHLORIDE 200 MG: 900 INJECTION, SOLUTION INTRAVENOUS at 10:57

## 2024-12-13 RX ADMIN — ZOLEDRONIC ACID 4 MG: 0.04 INJECTION, SOLUTION INTRAVENOUS at 10:38

## 2024-12-13 NOTE — PROGRESS NOTES
REASON FOR FOLLOW-UP:     Provide an opinion on any further workup or treatment of metastatic melanoma.                             HISTORY OF PRESENT ILLNESS:   The patient returns to the office today, 12/13/2024 in anticipation of his next cycle of Keytruda which she continues to receive every 3 weeks.  Last treated, he was briefly hospitalized 12/8/2024 through 12/9/2024.  He initially presented to the emergency department reporting he was in atrial fibrillation.  This was noted on his Apple Watch.  During hospitalization he underwent CT angiogram which noted new pulmonary emboli.  He was placed on Eliquis starter pack as well as metoprolol for the atrial fibrillation.  He was ultimately discharged.  He reports today he is tolerating the Eliquis without difficulty.  He has no signs or symptoms of bleeding.  He has minimal shortness of breath though recovers quickly.  He has no palpitations.  He has some intermittent abdominal pain.  He does remain off prednisone to allow recovery of his wrist.      Past Medical History:   Diagnosis Date    Acute renal failure (ARF) 09/2018    RESOLVED AFTER DEHYDRATION RESOLVED    Anemia     Arthritis     OA AND RA    Asthma     Atrial fibrillation with rapid ventricular response     Bone cancer     BPH (benign prostatic hyperplasia)     Chronic back pain     CKD (chronic kidney disease)     Stage 3    COPD (chronic obstructive pulmonary disease)     Coronary artery calcification seen on CAT scan     Eye cancer, left     TREATMENT WITH RADIATION. SOME VISON LOSS LEFT EYE    Gout     Hepatitis C     IN REMISSION. BEEN LONGER THAN 5 YRS    Hiatal hernia     History of atrial fibrillation     SEPT 2018. WITH SEVERE DEHYDRATION    History of blood transfusion 1978    in [place], no known reaction mild shortness of breath    Hypertension     Liver cancer     Lung cancer     MRSA infection     HX OF    Pneumonia     Psoriasis     Right hip pain     Short of breath on exertion      "Sleep apnea     \"MILD\". DID NOT FOLLOW THUR GETTING MACHINE RELATED TO COST OF MACHINE    Syncope 09/2018    secondary to atrial fibrillation with rapid ventricular response. SEVERELY DEHYDRATED AT THIS TIME.    Viral hepatitis     Vision loss of left eye     EYE CANCER    Vitamin D deficiency      Past Surgical History:   Procedure Laterality Date    BACK SURGERY      LUMBAR DISC X2. NO HARDWARE    CARPAL TUNNEL RELEASE WITH CUBITAL TUNNEL RELEASE Right     COLONOSCOPY      COLONOSCOPY N/A 07/30/2021    Procedure: COLONOSCOPY;  Surgeon: Flor Maciel MD;  [facility];  Service: Gastroenterology;  Laterality: N/A;  Diverticulosis    EYE SURGERY Left     Left Eye    HERNIA REPAIR  2016    LUMBAR SPINE SURGERY      x3 in 2000. 2010, 2011    REVISION TOTAL KNEE ARTHROPLASTY Right     TOTAL HIP ARTHROPLASTY Right 05/28/2019    Procedure: RIGHT TOTAL HIP ARTHROPLASTY;  Surgeon: Harish Dominguez MD;  Location: [facility] MAIN OR;  Service: Orthopedics    TOTAL KNEE ARTHROPLASTY Right     TOTAL KNEE ARTHROPLASTY Left 04/25/2024    Procedure: TOTAL KNEE ARTHROPLASTY;  Surgeon: Harish Dominguez MD;  Location: [facility] OR OSC;  Service: Orthopedics;  Laterality: Left;    VENOUS ACCESS DEVICE (PORT) INSERTION Right 8/23/2024    Procedure: INSERTION VENOUS ACCESS DEVICE;  Surgeon: Sal Wilhelm MD;  Location: [facility] MAIN OR;  Service: General;  Laterality: Right;         ONCOLOGY HISTORY:  The patient is a 63 y.o. year old male  who is here for initial evaluation on 7/25/2024 for evaluation of newly diagnosed metastatic melanoma.  This patient has end-stage arthritis required bilateral knee replacement, history of left eye melanoma, lumbar stenosis status post discectomy, hepatitis C, hypertension, and psoriasis.    His primary doctor found lung issues first.  Patient reports he was congested with a cough and dyspnea for about a month.  Patient went to see his primary care physician Dr. Joshua Blake on " 4/17/2024 who prescribed doxycycline and had a chest ray examination which reported multiple pulmonary nodules.  He subsequently had a chest CT on 5/152024 which reported a 1.7 cm nodule opacity in the right lower lobe with additional scattered small bilateral pulmonary nodules.  It also reported slightly increased size of mildly asymmetric prominent left axillary lymph nodes.  There is also mild asymmetric elevation of the left hemidiaphragm with a new segmental atelectasis and scarring at the left lung base.    Patient has subsequently had a PET scan examination at the [facility], and the reported the right lower lobe 1.6 cm solid nodule with a maximum SUV 3.6.  Multiple additional bilateral subcentimeter solid nodules too small for accurate PET characterization but mainly had uptake greater than background 11.  There was no enlarged hypermetabolic mediastinal or hilar lymph nodes.  In the abdomen, there were 2 dominant hypermetabolic hypodense right hepatic lesions with reference 2.7 cm with SUV 11.1.  There is at least 3 additional smaller hypermetabolic pedicle foci with the left hepatic lobe SUV 3.7.  The lesion in the caudate lobe had a maximum SUV 5.5.  There are multiple pulm lesions, the T6 lesion had SUV 10.8, and the right ilium lesion maximum SUV 10.7, and the right sacrum lesion SUV 9.7, and the left supra-acetabular ilium lesion with SUV 4.6.    The patient was seen by thoracic surgeon Dr. Riggs on 7/11/2024, Dr. Riggs recommended CT-guided core needle biopsy of the liver lesion.  This was done on 7/19/2024.  Pathology evaluation reported metastatic melanoma.     Patient reports today that he was diagnosed with melanoma in his left eye approximately 12 years ago during a routine eye exam. At that time, his eyesight was unimpaired, but a patch was placed in his eye. He spent about a week in a room following the procedure. He lost approximately 40 percent of his vision due to blockage. He  received radiation therapy in [facility].     He experienced a minor headache a few weeks ago, which is unusual for him. He denies experiencing nausea, vomiting, or abdominal pain. His appetite is normal. He experienced weight loss of 10 pounds about 1 to 2 months ago, but has since regained it and is attempting to lose weight again.    He is scheduled for an MRI of the spine on 08/09/2024. His back pain, which he rates as 6 out of 10 at its worst, is located in the lower back at the sacrum area. He also reports weakness in his legs and is not currently taking any medication for the pain. He has consulted with a neurosurgeon and has informed them of his cancer diagnosis.    Laboratory Studies on 7/25/2024  Hemoglobin 13.7 MCV 92.4, platelets 282,000 WBC 9360 neutrophils 4490 lymphocytes 3170, monocytes 1000 eosinophil 540 basophil 100 and immature granulocytes 60.    Biopsy from the liver confirmed metastatic melanoma.    MEDICATIONS    Current Outpatient Medications:     acetaminophen (TYLENOL) 325 MG tablet, Take 2 tablets by mouth Every 6 (Six) Hours As Needed for Mild Pain or Headache., Disp: , Rfl:     albuterol (PROVENTIL HFA;VENTOLIN HFA) 108 (90 BASE) MCG/ACT inhaler, Inhale 2 puffs every 4 (four) hours as needed for wheezing., Disp: 1 inhaler, Rfl: 3    allopurinol (ZYLOPRIM) 100 MG tablet, Take 1 tablet by mouth 2 (Two) Times a Day., Disp: , Rfl:     amLODIPine (NORVASC) 10 MG tablet, Take 1 tablet by mouth Daily., Disp: , Rfl: 6    Apixaban Starter Pack (Eliquis DVT/PE Starter Pack) tablet therapy pack, Take two 5 mg tablets by mouth every 12 hours for 7 days. Followed by one 5 mg tablet every 12 hours., Disp: 74 tablet, Rfl: 0    cholecalciferol (VITAMIN D3) 25 MCG (1000 UT) tablet, Take 1 tablet by mouth Daily., Disp: , Rfl:     Cyanocobalamin (VITAMIN B-12 PO), Take  by mouth., Disp: , Rfl:     diazePAM (VALIUM) 5 MG tablet, TAKE 1 TO 2 TABLETS BY MOUTH 30 MINUTES PRIOR TO MRI, Disp: , Rfl:      gabapentin (NEURONTIN) 100 MG capsule, Take 1 capsule by mouth 3 (Three) Times a Day., Disp: , Rfl:     HYDROcodone-acetaminophen (NORCO) 5-325 MG per tablet, Take 1 tablet by mouth Every 8 (Eight) Hours As Needed for Moderate Pain., Disp: 60 tablet, Rfl: 0    hydrOXYzine (ATARAX) 25 MG tablet, Take 1 tablet by mouth 3 (Three) Times a Day As Needed for Itching., Disp: 90 tablet, Rfl: 2    lidocaine-prilocaine (EMLA) 2.5-2.5 % cream, Apply nickel-sized amount over Mediport site 30 min prior to access. Do not rub in., Disp: 30 g, Rfl: 2    metoprolol tartrate (LOPRESSOR) 25 MG tablet, Take 1 tablet by mouth Every 12 (Twelve) Hours., Disp: 60 tablet, Rfl: 0    Omega-3 Fatty Acids (fish oil) 1000 MG capsule capsule, Take 1 capsule by mouth Daily With Breakfast. HOLD PER MD INSTR, Disp: , Rfl:     ondansetron (ZOFRAN) 8 MG tablet, Take 1 tablet by mouth., Disp: , Rfl:     predniSONE (DELTASONE) 10 MG tablet, Take 1 tablet by mouth Daily., Disp: 30 tablet, Rfl: 2    tamsulosin (FLOMAX) 0.4 MG capsule 24 hr capsule, Take 1 capsule by mouth 2 (Two) Times a Day., Disp: , Rfl:   No current facility-administered medications for this visit.    Facility-Administered Medications Ordered in Other Visits:     Pembrolizumab (KEYTRUDA) 200 mg in sodium chloride 0.9 % 108 mL chemo IVPB, 200 mg, Intravenous, Once, Oeswein, Lilia Marixa, APRN    sodium chloride 0.9 % infusion, 20 mL/hr, Intravenous, Once, Oeswein, Lilia Marixa, APRN    sodium chloride 0.9 % infusion, 20 mL/hr, Intravenous, Once, Oeswein, Lilia Marixa, APRN    zoledronic acid (ZOMETA) infusion 4 mg/100 mL (premix), 4 mg, Intravenous, Once, Oeswein, Lilia Marixa, APRN    ALLERGIES:   No Known Allergies    SOCIAL HISTORY:       Social History     Socioeconomic History    Marital status: Single   Tobacco Use    Smoking status: Never     Passive exposure: Never    Smokeless tobacco: Never   Vaping Use    Vaping status: Never Used   Substance and Sexual Activity     "Alcohol use: No     Comment: no caffeine     Drug use: No    Sexual activity: Defer   He is a  but has never smoked. He used to drink 2 drinks a day, but he has not drunk in over 20 to 25 years.      FAMILY HISTORY:  Family History   Problem Relation Age of Onset    Depression Mother     COPD Mother     Mental illness Mother     Diabetes Father     Hypertension Father     Heart disease Father     Hyperlipidemia Father     Cancer Father     Liver cancer Father     Drug abuse Neg Hx     Allergies Neg Hx     Asthma Neg Hx     Stroke Neg Hx     Malig Hyperthermia Neg Hx    His father had liver, bone cancer. He  of the cancer.  No details available.         Vitals:    24 0927   BP: 145/83   Pulse: 73   Resp: 16   Temp: 97.5 °F (36.4 °C)   TempSrc: Oral   SpO2: 97%   Weight: 100 kg (221 lb 4.8 oz)   Height: 182.9 cm (72.01\")   PainSc:   4   PainLoc: Back  Comment: pain in lower back and both legs         2024     9:27 AM   Current Status   ECOG score 0       Physical Exam  Constitutional:       General: He is not in acute distress.     Appearance: Normal appearance.   Eyes:      Conjunctiva/sclera: Conjunctivae normal.   Cardiovascular:      Rate and Rhythm: Normal rate and regular rhythm.   Pulmonary:      Effort: Pulmonary effort is normal.      Breath sounds: Normal breath sounds.   Abdominal:      General: Bowel sounds are normal.      Palpations: Abdomen is soft.   Musculoskeletal:      Right wrist: Swelling and laceration present. No tenderness.   Lymphadenopathy:      Cervical: No cervical adenopathy.   Skin:     General: Skin is warm and dry.      Findings: No rash.   Neurological:      Mental Status: He is alert. Mental status is at baseline.   Psychiatric:         Behavior: Behavior normal.         Thought Content: Thought content normal.       RECENT LABS:  Results from last 7 days   Lab Units 24  0925 12/10/24  1026 24  0656 24  1732   WBC 10*3/mm3 12.58* 11.1* 12.50* " 12.95*   NEUTROS ABS 10*3/mm3 10.23*  --   --  8.23*   HEMOGLOBIN g/dL 13.5 13.9 13.5 14.5   HEMATOCRIT % 41.9 42.2 41.6 44.7   PLATELETS 10*3/mm3 278  --  271 307   EXTERNAL PLATELETS x10(3)/uL  --  268  --   --      Results from last 7 days   Lab Units 12/13/24  0925 12/09/24  0656 12/08/24  1732   SODIUM mmol/L 139 140 138   POTASSIUM mmol/L 4.7 4.1 4.3   CHLORIDE mmol/L 108* 109* 106   CO2 mmol/L 19.0* 20.8* 19.8*   BUN mg/dL 21 29* 30*   CREATININE mg/dL 1.01 1.16 1.28*   CALCIUM mg/dL 9.2 8.4* 8.7   ALBUMIN g/dL 3.9  --  3.7   BILIRUBIN mg/dL 0.2  --  0.3   ALK PHOS U/L 71  --  67   ALT (SGPT) U/L 30  --  25   AST (SGOT) U/L 26  --  26   GLUCOSE mg/dL 110* 92 159*   MAGNESIUM mg/dL 2.3  --  2.3     Results from last 7 days   Lab Units 12/08/24  1732   INR  1.06   APTT seconds 28.0     IMAGING:  Adult Transthoracic Echo Complete W/ Cont if Necessary Per Protocol    Left ventricular systolic function is normal. Calculated left   ventricular EF = 52.9% Normal left ventricular cavity size noted. Left   ventricular wall thickness is consistent with moderate concentric   hypertrophy. All left ventricular wall segments contract normally. Left   ventricular diastolic function was indeterminate.    Normal right ventricular cavity size, systolic function and septal   motion noted. Right ventricular wall thickness is consistent with mild   hypertrophy.    The left atrial cavity is mildly dilated.    Trace mitral valve regurgitation is present.    Mild tricuspid valve regurgitation is present. Estimated right   ventricular systolic pressure from tricuspid regurgitation is normal (<35   mmHg). Calculated right ventricular systolic pressure from tricuspid   regurgitation is 28 mmHg.  CT Angiogram Chest  CT ANGIOGRAM OF THE CHEST. MULTIPLE CORONAL, SAGITTAL, AND 3-D  RECONSTRUCTIONS.     HISTORY: 64-year-old male with atrial fibrillation. Recently diagnosed  with COVID. Metastatic uveal melanoma. Lung, liver, and bone  metastases.     TECHNIQUE: Radiation dose reduction techniques were utilized, including  automated exposure control and exposure modulation based on body size.   CT angiogram of the chest was performed with 2mm images following the  administration of IV contrast. Multiple coronal, sagittal, and 3-D  reconstruction images were obtained. Compared with CT sequence of PET/CT  11/12/2024.     FINDINGS:  1. There is a very small nonocclusive pulmonary thromboembolus at a  right lower lobe segmental pulmonary artery extending into a  subsegmental branch. There are no other definitive pulmonary  thromboemboli. RV to LV ratio is less than 1.     2. The 1.3 cm pleural nodule at the right lower lobe is stable. There  are multiple bilateral pulmonary nodules which are without significant  change since the PET/CT given constraints of extensive breathing  artifact on the PET/CT. There are no pleural or pericardial effusions.        This report was finalized on 12/9/2024 2:27 PM by Dr. Anneliese Valdes M.D on  Workstation: [id number]             Assessment & Plan   Assessment & Plan      1. Metastatic melanoma in the liver, bilateral lungs and bones.  Patient has a history of left eye melanoma 12 years ago.  Patient had a cough and x-ray subsequently chest CT scan 7/15/2024 showed left lower lobe pulmonary nodule 1.7 cm, and multiple other smaller pulmonary nodules.  PET scan on 7/31/2024 reported multiple hypermetabolic lesions. The patient also has multiple metastatic hypermetabolic bone lesions including the T6 vertebral body and also the bilateral iliac wings and also the left sacrum close to the SI joint.   Patient had a liver biopsy confirmed to be metastatic melanoma.  I discussed with the patient the on 7/25/2024 that I recommended to test his tumor sample by [employer] NGS study to see if the patient will have BRAF mutation so he would be a candidate for oral TKI treatment. Then, we also would look into whether this patient  would be a candidate for other targeted therapy as well as immunotherapy.   Arrangements will be made for brain MRI with and without contrast.  MRI on 8/5/2024 reported no evidence for intracranial metastatic disease.  [employer] NGS study reported stable MSI and TMB at 2.1 m/MB, negative mutation for BRAF, NRAS and KIT.  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   Brain MRI examination on 8/5/2024 reported no evidence for intracranial metastatic disease.  Discussed with the patient on 8/14/2024, he is not a candidate for targeted therapy using oral TKI due to lack of mutation from BRAF NRAS and KIT.  I discussed with the patient for immunotherapy.  We discussed the single agent pembrolizumab versus doublets using nivolumab plus ipilimumab.  Due to the side effects profile, I recommended using a single agent pembrolizumab every 3 weeks due to its better tolerance.  Patient is agreeable.   Had first cycle immunotherapy pembrolizumab 8/27/2024.  Patient presented for evaluation on 9/17/2024, he reports experiencing some skin pruritus without any visible rashes. He also reports improved energy and appetite from the low-dose prednisone 10 mg daily. He denies any nausea, vomiting, or diarrhea. He has been using his inhaler occasionally for mild shortness of breath, which is not a new symptom. Continue current immunotherapy with pembrolizumab.   10/8/2024: Proceed with Cycle #3 Keytruda. Tolerating well overall.   10/29/2024: Proceed with Cycle #4 Keytruda. Tolerating well.    After 4 cycles of pembrolizumab, the PET scan revealed a mixed response, predominantly showing significant reduction in metastatic liver lesions and resolution of two small liver metastases. Notable improvement was also observed in some bone areas. However, two new small lesions were detected on the right chest wall. The images were reviewed with the patient and his wife, and it was recommended  to continue pembrolizumab for another 4 cycles, followed by PET scan again. If the two areas on the right chest wall continue to grow while other areas respond, local radiation therapy will be considered.   12/13/2024 proceed with cycle 6 pembrolizumab which is continued every 3 weeks    2.  Metastatic cancer to the bone.   PET scan examination showed multiple bone lesions.    Recommended to start the patient on IV Zometa no more than every 4 weeks.  Patient received first dose of Zometa on 7/31/2024.  Had good tolerance.  10/29/2024 Zometa was given and to be given every 6 weeks.   Proceed today, 12/13/2024 with Zometa which is continued every 6 weeks    3.  Back pain.  PET scan examination showed multiple bone lesions including T6 vertebral body, and the sacrum and the pelvic bone..    The patient reports low back pain and I looked at the PET scan images. I do not think necessarily the small left SI joint area bone lesion causing the problem. He reports that he was seen by neurosurgeon and is scheduled to have a spine MRI examination on 08/09/2024.  I certainly would like to see the results of that to make final assessment, to see whether this patient would benefit from some radiation therapy or he has lower back pain from other course. He previously had multiple lumbar spine surgeries for pinched nerve/discectomy.  MRI for T/L spine examination at the [facility] 8/9/2024 for new reported no evidence for metastatic disease.  There were extensive multilevel spondylosis in the T-spine and the moderate narrowing of the spinal canal C7-T1 and T4-T5.  The L-spine MRI examination reported marrow replacing lesion in the left sacral gale 1.2 cm, and also the lesion in the left posterior iliac bone measuring 1.7 cm.  There was also evidence of postsurgical changes.   11/19/2024 3 He continues to experience low back pain on the left side. He reported taking three doses of meloxicam, which may have contributed to his  renal injury. He was advised against further use of meloxicam, and he expressed understanding.      4. Generalized pain.  9/17/2024 he reports pain in his knees, back, and other areas. He has been taking Motrin for pain relief, approximately four times a day. Continue with Motrin as needed for pain management. If pain persists or worsens, consider alternative pain management strategies.  10/8/2024: He takes norco about once a week. Pain very well controlled.       5. Mild leukocytosis.  Laboratory studies on 09/17/2024 show mild leukocytosis with WBC of 11,070. ANC 5660, eosinophils 820, lymphocytes 3440, and monocytes 990. Hemoglobin is normal at 14.2, and platelets are 243,000. CMP, liver function panel, electrolytes, and glucose are all normal. No immediate intervention is required; continue to monitor blood counts.  11/19/2024 lab study showed a slight improvement in leukocytosis, which is currently only marginally elevated at 10,850, with mostly elevated monocytes 1360 and eosinophils 620, but normal neutrophils and lymphocytes.  Ongoing leukocytosis noted today, 12/13/2024 with WBC 12.58    6.  Vascular access.  Patient had a portacatheter placed by Dr. Wilhelm 8/23/2024.    7. Lacerated wrist   10/29/2024: He suffered a skill saw accident at work and lacerated his wrist. He underwent surgery for debridement on 10/18/2024 and has been recovering well. Photo uploaded in chart today as the wound looks dehisced. He is scheduled for post op appt tomorrow for ongoing management and further recommendations.   11/19/2024 he reports improvement in his right wrist. He will continue to follow up with the hand surgeon and keep the scheduled appointment.  This continues to improve    8. Acute renal injury.  11/19/2024 renal function has significantly deteriorated, with a creatinine level of 2.29 and a BUN of 47, likely due to dehydration. He admitted to reduced water intake. Intravenous hydration with 1 L normal saline  was recommended daily, and Keytruda was postponed until the coming Friday, with the hope of improved renal function by then. A lab study will be conducted this Friday prior to Keytruda administration. He was advised to increase water intake to maintain hydration at home.  Creatinine is normal today at 1.1    9.  New pulmonary emboli   Hospitalization 12/8/2024 through 12/9/2024 secondary to new atrial fibrillation  CT angiogram noted a very small nonocclusive pulmonary.  Patient placed on Eliquis starter pack  Currently on Eliquis 10 mg twice daily.  He understands after completing 1 week of Eliquis he would reduce to 5 mg twice daily.  We reviewed signs and symptoms of bleeding for which to monitor for    10.  Atrial fibrillation  Hospitalization 12/8/2024 through 12/9/2024 secondary to new atrial fibrillation  Evaluation by cardiology with patient to begin metoprolol  Rate is currently controlled at 73.    PLAN:   Proceed today with cycle 6 Keytruda which is continued every 3 weeks  Continue anticoagulation and completing starter pack of Eliquis 10 g twice daily x 7 days then reduce to 5 mg twice daily.  I did send a new prescription to patient's pharmacy for 5 mg twice daily which she understands to initiate after completing starter pack  Proceed today with Zometa which is continued every 6 weeks  Return in 3 weeks for CBC, CMP, MD follow-up with Dr. Ellis and cycle 7 Keytruda.  A PET scan will be scheduled after the eighth cycle of Keytruda for reassessment, particularly of the small new hypermetabolic lesion.   Continue to follow with PCP for management of hypertension.   Follow up closely with Dr. Thaddeus Osborn (hand surgeon) for ongoing management of laceration and post op care.   We have reviewed signs and symptoms of bleeding for which to monitor for now continuing on anticoagulation    Patient is on a high risk medication requiring close monitoring for toxicity.    I spent 40 minutes caring for Renzo on  this date of service. This time includes time spent by me in the following activities: preparing for the visit, reviewing tests, obtaining and/or reviewing a separately obtained history, performing a medically appropriate examination and/or evaluation, counseling and educating the patient/family/caregiver, ordering medications, tests, or procedures, documenting information in the medical record, and care coordination.     Lilia Hunt, APRN  12/13/2024

## 2024-12-27 ENCOUNTER — TELEPHONE (OUTPATIENT)
Dept: ONCOLOGY | Facility: CLINIC | Age: 64
End: 2024-12-27
Payer: MEDICARE

## 2024-12-27 NOTE — TELEPHONE ENCOUNTER
I HAVE CALLED AND LVM OF APPT WITH DR BERMUDEZ ON MONDAY 12/30/[address] BERMUDEZ BEING OUT OF THE OFFICE ON 1/3/25 - ASKED THAT THE PATIENT CALL [facility] BACK AT THE [place] OFFICE TO CONFIRM

## 2024-12-30 ENCOUNTER — INFUSION (OUTPATIENT)
Dept: ONCOLOGY | Facility: HOSPITAL | Age: 64
End: 2024-12-30
Payer: MEDICARE

## 2024-12-30 ENCOUNTER — OFFICE VISIT (OUTPATIENT)
Dept: ONCOLOGY | Facility: CLINIC | Age: 64
End: 2024-12-30
Payer: MEDICARE

## 2024-12-30 VITALS
WEIGHT: 221 LBS | DIASTOLIC BLOOD PRESSURE: 92 MMHG | HEART RATE: 54 BPM | HEIGHT: 72 IN | TEMPERATURE: 98.2 F | RESPIRATION RATE: 16 BRPM | SYSTOLIC BLOOD PRESSURE: 154 MMHG | BODY MASS INDEX: 29.93 KG/M2 | OXYGEN SATURATION: 94 %

## 2024-12-30 DIAGNOSIS — C78.7 MELANOMA OF UVEA METASTATIC TO LIVER: ICD-10-CM

## 2024-12-30 DIAGNOSIS — Z79.899 ENCOUNTER FOR LONG-TERM (CURRENT) USE OF HIGH-RISK MEDICATION: ICD-10-CM

## 2024-12-30 DIAGNOSIS — C78.7 MELANOMA OF UVEA METASTATIC TO LIVER: Primary | ICD-10-CM

## 2024-12-30 DIAGNOSIS — M54.50 CHRONIC LOW BACK PAIN, UNSPECIFIED BACK PAIN LATERALITY, UNSPECIFIED WHETHER SCIATICA PRESENT: ICD-10-CM

## 2024-12-30 DIAGNOSIS — C69.32 MALIGNANT NEOPLASM OF LEFT CHOROID: ICD-10-CM

## 2024-12-30 DIAGNOSIS — C69.40 MELANOMA OF UVEA METASTATIC TO LIVER: ICD-10-CM

## 2024-12-30 DIAGNOSIS — G89.3 CANCER RELATED PAIN: ICD-10-CM

## 2024-12-30 DIAGNOSIS — I26.93 SINGLE SUBSEGMENTAL PULMONARY EMBOLISM WITHOUT ACUTE COR PULMONALE: ICD-10-CM

## 2024-12-30 DIAGNOSIS — D72.823 LEUKEMOID REACTION: ICD-10-CM

## 2024-12-30 DIAGNOSIS — I48.91 ATRIAL FIBRILLATION WITH RAPID VENTRICULAR RESPONSE: ICD-10-CM

## 2024-12-30 DIAGNOSIS — C78.7 CANCER, METASTATIC TO LIVER: ICD-10-CM

## 2024-12-30 DIAGNOSIS — C69.40 MELANOMA OF UVEA METASTATIC TO LIVER: Primary | ICD-10-CM

## 2024-12-30 DIAGNOSIS — G89.29 CHRONIC LOW BACK PAIN, UNSPECIFIED BACK PAIN LATERALITY, UNSPECIFIED WHETHER SCIATICA PRESENT: ICD-10-CM

## 2024-12-30 DIAGNOSIS — C79.51 SECONDARY CANCER OF BONE: ICD-10-CM

## 2024-12-30 PROBLEM — Z79.01 ANTICOAGULATION ADEQUATE: Status: ACTIVE | Noted: 2024-12-30

## 2024-12-30 LAB
ALBUMIN SERPL-MCNC: 3.9 G/DL (ref 3.5–5.2)
ALBUMIN/GLOB SERPL: 1.3 G/DL
ALP SERPL-CCNC: 74 U/L (ref 39–117)
ALT SERPL W P-5'-P-CCNC: 21 U/L (ref 1–41)
ANION GAP SERPL CALCULATED.3IONS-SCNC: 12 MMOL/L (ref 5–15)
AST SERPL-CCNC: 19 U/L (ref 1–40)
BASOPHILS # BLD AUTO: 0.1 10*3/MM3 (ref 0–0.2)
BASOPHILS NFR BLD AUTO: 0.7 % (ref 0–1.5)
BILIRUB SERPL-MCNC: 0.4 MG/DL (ref 0–1.2)
BUN SERPL-MCNC: 21 MG/DL (ref 8–23)
BUN/CREAT SERPL: 21.6 (ref 7–25)
CALCIUM SPEC-SCNC: 8.8 MG/DL (ref 8.6–10.5)
CHLORIDE SERPL-SCNC: 106 MMOL/L (ref 98–107)
CO2 SERPL-SCNC: 23 MMOL/L (ref 22–29)
CREAT SERPL-MCNC: 0.97 MG/DL (ref 0.76–1.27)
DEPRECATED RDW RBC AUTO: 50.8 FL (ref 37–54)
EGFRCR SERPLBLD CKD-EPI 2021: 86.6 ML/MIN/1.73
EOSINOPHIL # BLD AUTO: 0.69 10*3/MM3 (ref 0–0.4)
EOSINOPHIL NFR BLD AUTO: 5.1 % (ref 0.3–6.2)
ERYTHROCYTE [DISTWIDTH] IN BLOOD BY AUTOMATED COUNT: 14.9 % (ref 12.3–15.4)
GLOBULIN UR ELPH-MCNC: 2.9 GM/DL
GLUCOSE SERPL-MCNC: 100 MG/DL (ref 65–99)
HCT VFR BLD AUTO: 44.6 % (ref 37.5–51)
HGB BLD-MCNC: 14.4 G/DL (ref 13–17.7)
IMM GRANULOCYTES # BLD AUTO: 0.07 10*3/MM3 (ref 0–0.05)
IMM GRANULOCYTES NFR BLD AUTO: 0.5 % (ref 0–0.5)
LYMPHOCYTES # BLD AUTO: 2.58 10*3/MM3 (ref 0.7–3.1)
LYMPHOCYTES NFR BLD AUTO: 19.1 % (ref 19.6–45.3)
MCH RBC QN AUTO: 29.6 PG (ref 26.6–33)
MCHC RBC AUTO-ENTMCNC: 32.3 G/DL (ref 31.5–35.7)
MCV RBC AUTO: 91.8 FL (ref 79–97)
MONOCYTES # BLD AUTO: 1.34 10*3/MM3 (ref 0.1–0.9)
MONOCYTES NFR BLD AUTO: 9.9 % (ref 5–12)
NEUTROPHILS NFR BLD AUTO: 64.7 % (ref 42.7–76)
NEUTROPHILS NFR BLD AUTO: 8.73 10*3/MM3 (ref 1.7–7)
NRBC BLD AUTO-RTO: 0 /100 WBC (ref 0–0.2)
PLATELET # BLD AUTO: 292 10*3/MM3 (ref 140–450)
PMV BLD AUTO: 8.7 FL (ref 6–12)
POTASSIUM SERPL-SCNC: 4.3 MMOL/L (ref 3.5–5.2)
PROT SERPL-MCNC: 6.8 G/DL (ref 6–8.5)
RBC # BLD AUTO: 4.86 10*6/MM3 (ref 4.14–5.8)
SODIUM SERPL-SCNC: 141 MMOL/L (ref 136–145)
T4 FREE SERPL-MCNC: 1.4 NG/DL (ref 0.92–1.68)
TSH SERPL DL<=0.05 MIU/L-ACNC: 2.83 UIU/ML (ref 0.27–4.2)
WBC NRBC COR # BLD AUTO: 13.51 10*3/MM3 (ref 3.4–10.8)

## 2024-12-30 PROCEDURE — 84439 ASSAY OF FREE THYROXINE: CPT | Performed by: INTERNAL MEDICINE

## 2024-12-30 PROCEDURE — 85025 COMPLETE CBC W/AUTO DIFF WBC: CPT

## 2024-12-30 PROCEDURE — 36591 DRAW BLOOD OFF VENOUS DEVICE: CPT

## 2024-12-30 PROCEDURE — 84443 ASSAY THYROID STIM HORMONE: CPT | Performed by: INTERNAL MEDICINE

## 2024-12-30 PROCEDURE — 80053 COMPREHEN METABOLIC PANEL: CPT

## 2024-12-30 RX ORDER — ALBUTEROL SULFATE 0.83 MG/ML
2.5 SOLUTION RESPIRATORY (INHALATION)
COMMUNITY
Start: 2024-12-19 | End: 2025-12-19

## 2024-12-30 RX ORDER — METHOCARBAMOL 750 MG/1
1 TABLET, FILM COATED ORAL 3 TIMES DAILY
COMMUNITY

## 2024-12-30 RX ORDER — PEMBROLIZUMAB 25 MG/ML
200 INJECTION, SOLUTION INTRAVENOUS
COMMUNITY

## 2024-12-30 RX ORDER — FLUTICASONE FUROATE, UMECLIDINIUM BROMIDE AND VILANTEROL TRIFENATATE 200; 62.5; 25 UG/1; UG/1; UG/1
1 POWDER RESPIRATORY (INHALATION) DAILY
COMMUNITY
Start: 2024-12-19

## 2024-12-30 RX ORDER — VITAMIN A 3000 MCG
CAPSULE ORAL
COMMUNITY
Start: 2024-10-14

## 2024-12-30 RX ORDER — ERGOCALCIFEROL 1.25 MG/1
50000 CAPSULE, LIQUID FILLED ORAL WEEKLY
COMMUNITY
Start: 2024-12-11

## 2024-12-30 NOTE — PROGRESS NOTES
REASON FOR FOLLOW-UP:     Provide an opinion on any further workup or treatment of metastatic melanoma.                             HISTORY OF PRESENT ILLNESS:   History of Present Illness  The patient is returning on 12/30/2024 for reevaluation prior to his 7th cycle of palliative pembrolizumab treatment.    He reports overall good health, with no significant pain except for mild back discomfort. He received epidural injections in his back last week, which provided some relief, although they caused a slight burning sensation in the nerve. His respiratory function is satisfactory. He experienced an episode of atrial fibrillation approximately 3 weeks ago, but his cardiac function has since improved. He also had small blood clots in his lungs. He notes that his white blood cell count has been elevated for some time, but he continues to feel well. He has been on prednisone for about 3 to 4 weeks and has tolerated it well, so he will try to avoid taking it if possible.    He occasionally experiences depressive episodes, which he attributes to prolonged bed rest. He is not currently on any medication for depression.        Results  Laboratory Studies 12/30/2024  Mild leukocytosis, WBC 13,510 including neutrophils 8730, lymphocytes 2580, monocytes 1340, and eosinophil 690. Normal hemoglobin of 14.4 and platelets 292,000. Chemistry lab reported glucose 100, unremarkable CMP.  Normal TSH 2.83 and free T4 at 1.4 ng/dL.      Past Medical History:   Diagnosis Date    Acute renal failure (ARF) 09/2018    RESOLVED AFTER DEHYDRATION RESOLVED    Anemia     Arthritis     OA AND RA    Asthma     Atrial fibrillation with rapid ventricular response     Bone cancer     BPH (benign prostatic hyperplasia)     Chronic back pain     CKD (chronic kidney disease)     Stage 3    COPD (chronic obstructive pulmonary disease)     Coronary artery calcification seen on CAT scan     Eye cancer, left     TREATMENT WITH RADIATION. SOME VISON LOSS  "LEFT EYE    Gout     Hepatitis C     IN REMISSION. BEEN LONGER THAN 5 YRS    Hiatal hernia     History of atrial fibrillation     SEPT 2018. WITH SEVERE DEHYDRATION    History of blood transfusion 1978    in [place], no known reaction mild shortness of breath    Hypertension     Liver cancer     Lung cancer     MRSA infection     HX OF    Pneumonia     Psoriasis     Right hip pain     Short of breath on exertion     Sleep apnea     \"MILD\". DID NOT FOLLOW THUR GETTING MACHINE RELATED TO COST OF MACHINE    Syncope 09/2018    secondary to atrial fibrillation with rapid ventricular response. SEVERELY DEHYDRATED AT THIS TIME.    Viral hepatitis     Vision loss of left eye     EYE CANCER    Vitamin D deficiency      Past Surgical History:   Procedure Laterality Date    BACK SURGERY      LUMBAR DISC X2. NO HARDWARE    CARPAL TUNNEL RELEASE WITH CUBITAL TUNNEL RELEASE Right     COLONOSCOPY      COLONOSCOPY N/A 07/30/2021    Procedure: COLONOSCOPY;  Surgeon: Flor Maciel MD;  Location: [facility] MAIN OR;  Service: Gastroenterology;  Laterality: N/A;  Diverticulosis    EYE SURGERY Left     Left Eye    HERNIA REPAIR  2016    LUMBAR SPINE SURGERY      x3 in 2000. 2010, 2011    REVISION TOTAL KNEE ARTHROPLASTY Right     TOTAL HIP ARTHROPLASTY Right 05/28/2019    Procedure: RIGHT TOTAL HIP ARTHROPLASTY;  Surgeon: Harish Dominguez MD;  Location: [facility] MAIN OR;  Service: Orthopedics    TOTAL KNEE ARTHROPLASTY Right     TOTAL KNEE ARTHROPLASTY Left 04/25/2024    Procedure: TOTAL KNEE ARTHROPLASTY;  Surgeon: Harish Dominguez MD;  [place] [facility] [place] [facility] [place]  Service: Orthopedics;  Laterality: Left;    VENOUS ACCESS DEVICE (PORT) INSERTION Right 8/23/2024    Procedure: INSERTION VENOUS ACCESS DEVICE;  Surgeon: Sal Wilhelm MD;  Location: [facility] MAIN OR;  Service: General;  Laterality: Right;         ONCOLOGY HISTORY:  The patient is a 63 y.o. year old male  who is here for initial evaluation on 7/25/2024 for " evaluation of newly diagnosed metastatic melanoma.  This patient has end-stage arthritis required bilateral knee replacement, history of left eye melanoma, lumbar stenosis status post discectomy, hepatitis C, hypertension, and psoriasis.    His primary doctor found lung issues first.  Patient reports he was congested with a cough and dyspnea for about a month.  Patient went to see his primary care physician Dr. Joshua Blake on 4/17/2024 who prescribed doxycycline and had a chest ray examination which reported multiple pulmonary nodules.  He subsequently had a chest CT on 5/152024 which reported a 1.7 cm nodule opacity in the right lower lobe with additional scattered small bilateral pulmonary nodules.  It also reported slightly increased size of mildly asymmetric prominent left axillary lymph nodes.  There is also mild asymmetric elevation of the left hemidiaphragm with a new segmental atelectasis and scarring at the left lung base.    Patient has subsequently had a PET scan examination at the [facility], and the reported the right lower lobe 1.6 cm solid nodule with a maximum SUV 3.6.  Multiple additional bilateral subcentimeter solid nodules too small for accurate PET characterization but mainly had uptake greater than background 11.  There was no enlarged hypermetabolic mediastinal or hilar lymph nodes.  In the abdomen, there were 2 dominant hypermetabolic hypodense right hepatic lesions with reference 2.7 cm with SUV 11.1.  There is at least 3 additional smaller hypermetabolic pedicle foci with the left hepatic lobe SUV 3.7.  The lesion in the caudate lobe had a maximum SUV 5.5.  There are multiple pulm lesions, the T6 lesion had SUV 10.8, and the right ilium lesion maximum SUV 10.7, and the right sacrum lesion SUV 9.7, and the left supra-acetabular ilium lesion with SUV 4.6.    The patient was seen by thoracic surgeon Dr. Riggs on 7/11/2024, Dr. Riggs recommended CT-guided core needle biopsy of the  liver lesion.  This was done on 7/19/2024.  Pathology evaluation reported metastatic melanoma.     Patient reports today that he was diagnosed with melanoma in his left eye approximately 12 years ago during a routine eye exam. At that time, his eyesight was unimpaired, but a patch was placed in his eye. He spent about a week in a room following the procedure. He lost approximately 40 percent of his vision due to blockage. He received radiation therapy in [facility].     He experienced a minor headache a few weeks ago, which is unusual for him. He denies experiencing nausea, vomiting, or abdominal pain. His appetite is normal. He experienced weight loss of 10 pounds about 1 to 2 months ago, but has since regained it and is attempting to lose weight again.    He is scheduled for an MRI of the spine on 08/09/2024. His back pain, which he rates as 6 out of 10 at its worst, is located in the lower back at the sacrum area. He also reports weakness in his legs and is not currently taking any medication for the pain. He has consulted with a neurosurgeon and has informed them of his cancer diagnosis.    Laboratory Studies on 7/25/2024  Hemoglobin 13.7 MCV 92.4, platelets 282,000 WBC 9360 neutrophils 4490 lymphocytes 3170, monocytes 1000 eosinophil 540 basophil 100 and immature granulocytes 60.    Biopsy from the liver confirmed metastatic melanoma.    MEDICATIONS    Current Outpatient Medications:     albuterol (PROVENTIL HFA;VENTOLIN HFA) 108 (90 BASE) MCG/ACT inhaler, Inhale 2 puffs every 4 (four) hours as needed for wheezing., Disp: 1 inhaler, Rfl: 3    albuterol (PROVENTIL) (2.5 MG/3ML) 0.083% nebulizer solution, Inhale 2.5 mg., Disp: , Rfl:     allopurinol (ZYLOPRIM) 100 MG tablet, Take 1 tablet by mouth 2 (Two) Times a Day., Disp: , Rfl:     amLODIPine (NORVASC) 10 MG tablet, Take 1 tablet by mouth Daily., Disp: , Rfl: 6    apixaban (ELIQUIS) 5 MG tablet tablet, Take 1 tablet by mouth 2 (Two) Times a Day., Disp:  60 tablet, Rfl: 3    Apixaban Starter Pack (Eliquis DVT/PE Starter Pack) tablet therapy pack, Take two 5 mg tablets by mouth every 12 hours for 7 days. Followed by one 5 mg tablet every 12 hours., Disp: 74 tablet, Rfl: 0    Cyanocobalamin (VITAMIN B-12 PO), Take  by mouth., Disp: , Rfl:     gabapentin (NEURONTIN) 100 MG capsule, Take 1 capsule by mouth 3 (Three) Times a Day., Disp: , Rfl:     HYDROcodone-acetaminophen (NORCO) 5-325 MG per tablet, Take 1 tablet by mouth Every 8 (Eight) Hours As Needed for Moderate Pain., Disp: 60 tablet, Rfl: 0    hydrOXYzine (ATARAX) 25 MG tablet, Take 1 tablet by mouth 3 (Three) Times a Day As Needed for Itching., Disp: 90 tablet, Rfl: 2    lidocaine-prilocaine (EMLA) 2.5-2.5 % cream, Apply nickel-sized amount over Mediport site 30 min prior to access. Do not rub in., Disp: 30 g, Rfl: 2    methocarbamol (ROBAXIN) 750 MG tablet, Take 1 tablet by mouth 3 (Three) Times a Day., Disp: , Rfl:     metoprolol tartrate (LOPRESSOR) 25 MG tablet, Take 1 tablet by mouth Every 12 (Twelve) Hours., Disp: 60 tablet, Rfl: 0    Omega-3 Fatty Acids (fish oil) 1000 MG capsule capsule, Take 1 capsule by mouth Daily With Breakfast. HOLD PER MD INSTR, Disp: , Rfl:     ondansetron (ZOFRAN) 8 MG tablet, Take 1 tablet by mouth., Disp: , Rfl:     Pembrolizumab (Keytruda) 100 MG/4ML solution, Infuse 8 mL into a venous catheter., Disp: , Rfl:     predniSONE (DELTASONE) 10 MG tablet, Take 1 tablet by mouth Daily., Disp: 30 tablet, Rfl: 2    tamsulosin (FLOMAX) 0.4 MG capsule 24 hr capsule, Take 1 capsule by mouth 2 (Two) Times a Day., Disp: , Rfl:     Trelegy Ellipta 200-62.5-25 MCG/ACT inhaler, Inhale 1 puff Daily., Disp: , Rfl:     Vitamin A 7.5 MG (10152 UT) capsule, 1 Cap, Oral, Cap, Daily, # 30 Cap, 0 Refill(s), Pharmacy: [id number], 182.88 cm, 10/14/24 18:58:00 EDT, CLINICALHEIGHT, 96.36, kg, 10/14/24 18:58:00 EDT, CLINICALWEIGHT, Disp: , Rfl:     vitamin D (ERGOCALCIFEROL) 1.25 MG (04899  "UT) capsule capsule, Take 1 capsule by mouth 1 (One) Time Per Week., Disp: , Rfl:     acetaminophen (TYLENOL) 325 MG tablet, Take 2 tablets by mouth Every 6 (Six) Hours As Needed for Mild Pain or Headache. (Patient not taking: Reported on 2024), Disp: , Rfl:     cholecalciferol (VITAMIN D3) 25 MCG (1000 UT) tablet, Take 1 tablet by mouth Daily. (Patient not taking: Reported on 2024), Disp: , Rfl:     diazePAM (VALIUM) 5 MG tablet, TAKE 1 TO 2 TABLETS BY MOUTH 30 MINUTES PRIOR TO MRI (Patient not taking: Reported on 2024), Disp: , Rfl:     ALLERGIES:   No Known Allergies    SOCIAL HISTORY:       Social History     Socioeconomic History    Marital status: Single   Tobacco Use    Smoking status: Never     Passive exposure: Never    Smokeless tobacco: Never   Vaping Use    Vaping status: Never Used   Substance and Sexual Activity    Alcohol use: No     Comment: no caffeine     Drug use: No    Sexual activity: Defer   He is a  but has never smoked. He used to drink 2 drinks a day, but he has not drunk in over 20 to 25 years.      FAMILY HISTORY:  Family History   Problem Relation Age of Onset    Depression Mother     COPD Mother     Mental illness Mother     Diabetes Father     Hypertension Father     Heart disease Father     Hyperlipidemia Father     Cancer Father     Liver cancer Father     Drug abuse Neg Hx     Allergies Neg Hx     Asthma Neg Hx     Stroke Neg Hx     Malig Hyperthermia Neg Hx    His father had liver, bone cancer. He  of the cancer.  No details available.         Vitals:    24 1238   BP: 154/92   Pulse: 54   Resp: 16   Temp: 98.2 °F (36.8 °C)   TempSrc: Oral   SpO2: 94%   Weight: 100 kg (221 lb)   Height: 182.9 cm (72.01\")   PainSc:   2   PainLoc: Back         2024    12:51 PM   Current Status   ECOG score 0       Physical Exam  Constitutional:       General: He is not in acute distress.     Appearance: Normal appearance.   Eyes:      Conjunctiva/sclera: " Conjunctivae normal.   Cardiovascular:      Rate and Rhythm: Normal rate and regular rhythm.      Heart sounds: Normal heart sounds.   Pulmonary:      Effort: Pulmonary effort is normal.      Breath sounds: Normal breath sounds.   Abdominal:      General: Bowel sounds are normal.      Palpations: Abdomen is soft.      Tenderness: There is no abdominal tenderness.   Musculoskeletal:      Right lower leg: No edema.      Left lower leg: No edema.   Lymphadenopathy:      Cervical: No cervical adenopathy.   Skin:     General: Skin is warm and dry.      Findings: No rash.   Neurological:      Mental Status: He is alert. Mental status is at baseline.   Psychiatric:         Behavior: Behavior normal.         Thought Content: Thought content normal.       RECENT LABS:  Results from last 7 days   Lab Units 12/30/24  1219   WBC 10*3/mm3 13.51*   NEUTROS ABS 10*3/mm3 8.73*   HEMOGLOBIN g/dL 14.4   HEMATOCRIT % 44.6   PLATELETS 10*3/mm3 292       Results from last 7 days   Lab Units 12/30/24  1219   SODIUM mmol/L 141   POTASSIUM mmol/L 4.3   CHLORIDE mmol/L 106   CO2 mmol/L 23.0   BUN mg/dL 21   CREATININE mg/dL 0.97   CALCIUM mg/dL 8.8   ALBUMIN g/dL 3.9   BILIRUBIN mg/dL 0.4   ALK PHOS U/L 74   ALT (SGPT) U/L 21   AST (SGOT) U/L 19   GLUCOSE mg/dL 100*           IMAGING:  Adult Transthoracic Echo Complete W/ Cont if Necessary Per Protocol    Left ventricular systolic function is normal. Calculated left   ventricular EF = 52.9% Normal left ventricular cavity size noted. Left   ventricular wall thickness is consistent with moderate concentric   hypertrophy. All left ventricular wall segments contract normally. Left   ventricular diastolic function was indeterminate.    Normal right ventricular cavity size, systolic function and septal   motion noted. Right ventricular wall thickness is consistent with mild   hypertrophy.    The left atrial cavity is mildly dilated.    Trace mitral valve regurgitation is present.    Mild  tricuspid valve regurgitation is present. Estimated right   ventricular systolic pressure from tricuspid regurgitation is normal (<35   mmHg). Calculated right ventricular systolic pressure from tricuspid   regurgitation is 28 mmHg.  CT Angiogram Chest  CT ANGIOGRAM OF THE CHEST. MULTIPLE CORONAL, SAGITTAL, AND 3-D  RECONSTRUCTIONS.     HISTORY: 64-year-old male with atrial fibrillation. Recently diagnosed  with COVID. Metastatic uveal melanoma. Lung, liver, and bone metastases.     TECHNIQUE: Radiation dose reduction techniques were utilized, including  automated exposure control and exposure modulation based on body size.   CT angiogram of the chest was performed with 2mm images following the  administration of IV contrast. Multiple coronal, sagittal, and 3-D  reconstruction images were obtained. Compared with CT sequence of PET/CT  11/12/2024.     FINDINGS:  1. There is a very small nonocclusive pulmonary thromboembolus at a  right lower lobe segmental pulmonary artery extending into a  subsegmental branch. There are no other definitive pulmonary  thromboemboli. RV to LV ratio is less than 1.     2. The 1.3 cm pleural nodule at the right lower lobe is stable. There  are multiple bilateral pulmonary nodules which are without significant  change since the PET/CT given constraints of extensive breathing  artifact on the PET/CT. There are no pleural or pericardial effusions.        This report was finalized on 12/9/2024 2:27 PM by Dr. Anneliese Valdes M.D on  Workstation: [id number]             Assessment & Plan   Assessment & Plan       1. Metastatic melanoma in the liver, bilateral lungs and bones.  Patient has a history of left eye melanoma 12 years ago.  Patient had a cough and x-ray subsequently chest CT scan 7/15/2024 showed left lower lobe pulmonary nodule 1.7 cm, and multiple other smaller pulmonary nodules.  PET scan on 7/31/2024 reported multiple hypermetabolic lesions. The patient also has multiple  metastatic hypermetabolic bone lesions including the T6 vertebral body and also the bilateral iliac wings and also the left sacrum close to the SI joint.   Patient had a liver biopsy confirmed to be metastatic melanoma.  I discussed with the patient the on 7/25/2024 that I recommended to test his tumor sample by [employer] NGS study to see if the patient will have BRAF mutation so he would be a candidate for oral TKI treatment. Then, we also would look into whether this patient would be a candidate for other targeted therapy as well as immunotherapy.   Arrangements will be made for brain MRI with and without contrast.  MRI on 8/5/2024 reported no evidence for intracranial metastatic disease.  [employer] NGS study reported stable MSI and TMB at 2.1 m/MB, negative mutation for BRAF, NRAS and KIT.  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   Brain MRI examination on 8/5/2024 reported no evidence for intracranial metastatic disease.  Discussed with the patient on 8/14/2024, he is not a candidate for targeted therapy using oral TKI due to lack of mutation from BRAF NRAS and KIT.  I discussed with the patient for immunotherapy.  We discussed the single agent pembrolizumab versus doublets using nivolumab plus ipilimumab.  Due to the side effects profile, I recommended using a single agent pembrolizumab every 3 weeks due to its better tolerance.  Patient is agreeable.   Had first cycle immunotherapy pembrolizumab 8/27/2024.  Patient presented for evaluation on 9/17/2024, he reports experiencing some skin pruritus without any visible rashes. He also reports improved energy and appetite from the low-dose prednisone 10 mg daily. He denies any nausea, vomiting, or diarrhea. He has been using his inhaler occasionally for mild shortness of breath, which is not a new symptom. Continue current immunotherapy with pembrolizumab.   10/8/2024: Proceed with Cycle #3 Keytruda. Tolerating  well overall.   10/29/2024: Proceed with Cycle #4 Keytruda. Tolerating well.    After 4 cycles of pembrolizumab, the PET scan revealed a mixed response, predominantly showing significant reduction in metastatic liver lesions and resolution of two small liver metastases. Notable improvement was also observed in some bone areas. However, two new small lesions were detected on the right chest wall. The images were reviewed with the patient and his wife, and it was recommended to continue pembrolizumab for another 4 cycles, followed by PET scan again. If the two areas on the right chest wall continue to grow while other areas respond, local radiation therapy will be considered.   12/13/2024 proceed with cycle 6 pembrolizumab which is continued every 3 weeks      2.  Metastatic cancer to the bone.   PET scan examination showed multiple bone lesions.    Recommended to start the patient on IV Zometa no more than every 4 weeks.  Patient received first dose of Zometa on 7/31/2024.  Had good tolerance.  10/29/2024 Zometa was given and to be given every 6 weeks.   On 12/13/2024 with Zometa which is continued every 6 weeks.     3.  Back pain.  PET scan examination showed multiple bone lesions including T6 vertebral body, and the sacrum and the pelvic bone..    The patient reports low back pain and I looked at the PET scan images. I do not think necessarily the small left SI joint area bone lesion causing the problem. He reports that he was seen by neurosurgeon and is scheduled to have a spine MRI examination on 08/09/2024.  I certainly would like to see the results of that to make final assessment, to see whether this patient would benefit from some radiation therapy or he has lower back pain from other course. He previously had multiple lumbar spine surgeries for pinched nerve/discectomy.  MRI for T/L spine examination at the [facility] 8/9/2024 for new reported no evidence for metastatic disease.  There were extensive  multilevel spondylosis in the T-spine and the moderate narrowing of the spinal canal C7-T1 and T4-T5.  The L-spine MRI examination reported marrow replacing lesion in the left sacral gale 1.2 cm, and also the lesion in the left posterior iliac bone measuring 1.7 cm.  There was also evidence of postsurgical changes.   11/19/2024 3 He continues to experience low back pain on the left side. He reported taking three doses of meloxicam, which may have contributed to his renal injury. He was advised against further use of meloxicam, and he expressed understanding.  Today Renzo Streeter reports a pain score of 2.  Given his pain assessment as noted, treatment options were discussed and the following options were decided upon as a follow-up plan to address the patient's pain: continuation of current treatment plan for pain    4. Generalized pain.  9/17/2024 he reports pain in his knees, back, and other areas. He has been taking Motrin for pain relief, approximately four times a day. Continue with Motrin as needed for pain management. If pain persists or worsens, consider alternative pain management strategies.  10/8/2024: He takes norco about once a week. Pain very well controlled.   Renzo Streeter reports a pain score of 2.  Given his pain assessment as noted, treatment options were discussed and the following options were decided upon as a follow-up plan to address the patient's pain: continuation of current treatment plan for pain.      5. Mild leukocytosis.  Laboratory studies on 09/17/2024 show mild leukocytosis with WBC of 11,070. ANC 5660, eosinophils 820, lymphocytes 3440, and monocytes 990. Hemoglobin is normal at 14.2, and platelets are 243,000. CMP, liver function panel, electrolytes, and glucose are all normal. No immediate intervention is required; continue to monitor blood counts.  11/19/2024 lab study showed a slight improvement in leukocytosis, which is currently only marginally elevated at 10,850, with  mostly elevated monocytes 1360 and eosinophils 620, but normal neutrophils and lymphocytes.  Ongoing leukocytosis noted today, 12/13/2024 with WBC 12.58.   12/30/2024 WBC 13,500, including ANC 8730.  This is probably related to steroids intra-articular injection.  Patient is afebrile.  No signs for infection. with an increase in eosinophils to 690, which could be due to seasonal allergies or a reaction to medications.     6.  Vascular access.  Patient had a portacatheter placed by Dr. Wilhelm 8/23/2024.    7. Lacerated wrist   10/29/2024: He suffered a skill saw accident at work and lacerated his wrist. He underwent surgery for debridement on 10/18/2024 and has been recovering well. Photo uploaded in chart today as the wound looks dehisced. He is scheduled for post op appt tomorrow for ongoing management and further recommendations.   11/19/2024 he reports improvement in his right wrist. He will continue to follow up with the hand surgeon and keep the scheduled appointment.  This continues to improve.    No active problem today.    8. Acute renal injury.  11/19/2024 renal function has significantly deteriorated, with a creatinine level of 2.29 and a BUN of 47, likely due to dehydration. He admitted to reduced water intake. Intravenous hydration with 1 L normal saline was recommended daily, and Keytruda was postponed until the coming Friday, with the hope of improved renal function by then. A lab study will be conducted this Friday prior to Keytruda administration. He was advised to increase water intake to maintain hydration at home.  Creatinine is normal today at 0.97.    9.  New pulmonary emboli   Hospitalization 12/8/2024 through 12/9/2024 secondary to new atrial fibrillation  CT angiogram noted a very small nonocclusive pulmonary.  Patient placed on Eliquis starter pack  Currently on Eliquis 10 mg twice daily.  He understands after completing 1 week of Eliquis he would reduce to 5 mg twice daily.  We reviewed  signs and symptoms of bleeding for which to monitor for.   12/30/2024 patient reports tolerating Eliquis denies bleeding or bruising.    10.  Atrial fibrillation  Hospitalization 12/8/2024 through 12/9/2024 secondary to new atrial fibrillation  Evaluation by cardiology with patient to begin metoprolol  Patient is on Eliquis anticoagulation.    11. Depression.  Today 12/30/2024 he reports feeling depressed occasionally, especially when he stays in bed for extended periods. He is not currently on any medication for depression. He is encouraged to engage in physical activity and exercise to help alleviate symptoms.   Patient screened positive for depression based on a PHQ-9 score of  on . Follow-up recommendations include: Suicide Risk Assessment performed.       PLAN:   He will continue with his immunotherapy sessions, with the 7th cycle scheduled for this Friday.   Continue Eliquis 5 mg twice a day for anticoagulation.  Continue Norco every 8 hours as needed for pain control.  Continue management of hypertension and followed by primary physician.  He will see APRN in 3 weeks with routine lab studies CBC CMP before the 8th treatment.    Patient also will be due for Zometa in 3 weeks, with labs per protocol.  Zometa be continued every 6 weeks.  In 5 weeks a PET scan will be conducted after the 8th cycle to assess the response to immunotherapy treatment.     I will see him in 6 weeks for evaluation to discuss the results of PET scan examination, he will be booked for treatment unless the PET scan indicates a need to change medication.  Continue to follow with PCP for management of hypertension.   We have reviewed signs and symptoms of bleeding for which to monitor for now continuing on anticoagulation    Patient is on a high risk medication requiring close monitoring for toxicity.    I spent 43 minutes caring for Renzo on this date of service. This time includes time spent by me in the following activities: preparing  for the visit, reviewing tests, obtaining and/or reviewing a separately obtained history, performing a medically appropriate examination and/or evaluation, counseling and educating the patient/family/caregiver, ordering medications, tests, or procedures, referring and communicating with other health care professionals, documenting information in the medical record, independently interpreting results and communicating that information with the patient/family/caregiver and care coordination         Jasmine Ellis MD PhD  12/30/2024

## 2025-01-03 ENCOUNTER — APPOINTMENT (OUTPATIENT)
Dept: ONCOLOGY | Facility: HOSPITAL | Age: 65
End: 2025-01-03
Payer: MEDICARE

## 2025-01-03 ENCOUNTER — INFUSION (OUTPATIENT)
Dept: ONCOLOGY | Facility: HOSPITAL | Age: 65
End: 2025-01-03
Payer: MEDICARE

## 2025-01-03 VITALS
OXYGEN SATURATION: 95 % | BODY MASS INDEX: 29.94 KG/M2 | TEMPERATURE: 97.5 F | SYSTOLIC BLOOD PRESSURE: 120 MMHG | DIASTOLIC BLOOD PRESSURE: 67 MMHG | HEART RATE: 57 BPM | RESPIRATION RATE: 16 BRPM | WEIGHT: 220.8 LBS

## 2025-01-03 DIAGNOSIS — C69.40 MELANOMA OF UVEA METASTATIC TO LIVER: Primary | ICD-10-CM

## 2025-01-03 DIAGNOSIS — Z79.899 ENCOUNTER FOR LONG-TERM (CURRENT) USE OF HIGH-RISK MEDICATION: ICD-10-CM

## 2025-01-03 DIAGNOSIS — Z45.2 FITTING AND ADJUSTMENT OF VASCULAR CATHETER: ICD-10-CM

## 2025-01-03 DIAGNOSIS — C78.7 MELANOMA OF UVEA METASTATIC TO LIVER: Primary | ICD-10-CM

## 2025-01-03 PROCEDURE — 96413 CHEMO IV INFUSION 1 HR: CPT

## 2025-01-03 PROCEDURE — 25010000002 PEMBROLIZUMAB 100 MG/4ML SOLUTION 4 ML VIAL: Performed by: NURSE PRACTITIONER

## 2025-01-03 PROCEDURE — 25010000002 HEPARIN LOCK FLUSH PER 10 UNITS: Performed by: INTERNAL MEDICINE

## 2025-01-03 RX ORDER — SODIUM CHLORIDE 0.9 % (FLUSH) 0.9 %
10 SYRINGE (ML) INJECTION AS NEEDED
Status: DISCONTINUED | OUTPATIENT
Start: 2025-01-03 | End: 2025-01-03 | Stop reason: HOSPADM

## 2025-01-03 RX ORDER — SODIUM CHLORIDE 0.9 % (FLUSH) 0.9 %
10 SYRINGE (ML) INJECTION AS NEEDED
OUTPATIENT
Start: 2025-01-03

## 2025-01-03 RX ORDER — SODIUM CHLORIDE 9 MG/ML
20 INJECTION, SOLUTION INTRAVENOUS ONCE
Status: CANCELLED | OUTPATIENT
Start: 2025-01-03

## 2025-01-03 RX ORDER — HEPARIN SODIUM (PORCINE) LOCK FLUSH IV SOLN 100 UNIT/ML 100 UNIT/ML
500 SOLUTION INTRAVENOUS AS NEEDED
Status: DISCONTINUED | OUTPATIENT
Start: 2025-01-03 | End: 2025-01-03 | Stop reason: HOSPADM

## 2025-01-03 RX ORDER — HEPARIN SODIUM (PORCINE) LOCK FLUSH IV SOLN 100 UNIT/ML 100 UNIT/ML
500 SOLUTION INTRAVENOUS AS NEEDED
OUTPATIENT
Start: 2025-01-03

## 2025-01-03 RX ADMIN — SODIUM CHLORIDE 200 MG: 900 INJECTION, SOLUTION INTRAVENOUS at 10:59

## 2025-01-03 RX ADMIN — Medication 300 UNITS: at 11:32

## 2025-01-03 RX ADMIN — Medication 10 ML: at 11:32

## 2025-01-07 ENCOUNTER — PATIENT OUTREACH (OUTPATIENT)
Dept: OTHER | Facility: HOSPITAL | Age: 65
End: 2025-01-07
Payer: MEDICARE

## 2025-01-07 NOTE — PROGRESS NOTES
Reviewed chart.  Reviewed chart. Patient with metastatic melanoma. Rec'd C7 Keytruda 1/3/25, next dose due 1/24/25. Pet scheduled 2/7/25, Sees Dr. Ellis 2/14/25    Called patient. Left message that I was just touching base to see how he was doing with treatment. Wanted to know if he had any questions/concerns or resource/supportive care needs; requested cb

## 2025-01-10 ENCOUNTER — OFFICE VISIT (OUTPATIENT)
Age: 65
End: 2025-01-10
Payer: MEDICARE

## 2025-01-10 VITALS
HEART RATE: 68 BPM | BODY MASS INDEX: 29.66 KG/M2 | DIASTOLIC BLOOD PRESSURE: 80 MMHG | SYSTOLIC BLOOD PRESSURE: 130 MMHG | WEIGHT: 219 LBS | OXYGEN SATURATION: 97 % | HEIGHT: 72 IN

## 2025-01-10 DIAGNOSIS — I26.93 SINGLE SUBSEGMENTAL PULMONARY EMBOLISM WITHOUT ACUTE COR PULMONALE: ICD-10-CM

## 2025-01-10 DIAGNOSIS — I48.0 PAF (PAROXYSMAL ATRIAL FIBRILLATION): Primary | ICD-10-CM

## 2025-01-10 RX ORDER — METOPROLOL TARTRATE 25 MG/1
25 TABLET, FILM COATED ORAL EVERY 12 HOURS SCHEDULED
Qty: 180 TABLET | Refills: 3 | Status: SHIPPED | OUTPATIENT
Start: 2025-01-10

## 2025-01-10 NOTE — PROGRESS NOTES
Subjective:     Encounter Date:01/10/2025      Patient ID: Renzo Streeter is a 64 y.o. male.    Chief Complaint:  Pulmonary embolism, AF    HPI:   64 y.o. male with a history of A-fib, hypertension, metastatic melanoma, recent diagnosis of small nonocclusive PE in the right lower lobe segmental artery in early December during presentation for tachycardia who presents for follow-up.  At that time, patient was found to be in A-fib.  He underwent echocardiogram which revealed EF 53%, normal RV size and function, trace MR, mild TR. he was hemodynamically stable, started on Eliquis and discharged home.  He presents for follow-up.  He has been feeling well, has not had any issues with his Eliquis including bleeding.  He denies any shortness of breath or [place] exertion but does state that he feels a bit congested.        The following portions of the patient's history were reviewed and updated as appropriate: allergies, current medications, past family history, past medical history, past social history, past surgical history and problem list.     REVIEW OF SYSTEMS:   All systems reviewed.  Pertinent positives identified in HPI.  All other systems are negative.    Past Medical History:   Diagnosis Date    Acute renal failure (ARF) 09/2018    RESOLVED AFTER DEHYDRATION RESOLVED    Anemia     Arthritis     OA AND RA    Asthma     Atrial fibrillation with rapid ventricular response     Bone cancer     BPH (benign prostatic hyperplasia)     Chronic back pain     CKD (chronic kidney disease)     Stage 3    COPD (chronic obstructive pulmonary disease)     Coronary artery calcification seen on CAT scan     Eye cancer, left     TREATMENT WITH RADIATION. SOME VISON LOSS LEFT EYE    Gout     Hepatitis C     IN REMISSION. BEEN LONGER THAN 5 YRS    Hiatal hernia     History of atrial fibrillation     SEPT 2018. WITH SEVERE DEHYDRATION    History of blood transfusion 1978    in [place], no known reaction mild shortness of  "breath    Hypertension     Liver cancer     Lung cancer     MRSA infection     HX OF    Pneumonia     Psoriasis     Right hip pain     Short of breath on exertion     Sleep apnea     \"MILD\". DID NOT FOLLOW THUR GETTING MACHINE RELATED TO COST OF MACHINE    Syncope 09/2018    secondary to atrial fibrillation with rapid ventricular response. SEVERELY DEHYDRATED AT THIS TIME.    Viral hepatitis     Vision loss of left eye     EYE CANCER    Vitamin D deficiency        Family History   Problem Relation Age of Onset    Depression Mother     COPD Mother     Mental illness Mother     Diabetes Father     Hypertension Father     Heart disease Father     Hyperlipidemia Father     Cancer Father     Liver cancer Father     Drug abuse Neg Hx     Allergies Neg Hx     Asthma Neg Hx     Stroke Neg Hx     Malig Hyperthermia Neg Hx        Social History     Socioeconomic History    Marital status: Single   Tobacco Use    Smoking status: Never     Passive exposure: Never    Smokeless tobacco: Never   Vaping Use    Vaping status: Never Used   Substance and Sexual Activity    Alcohol use: No     Comment: no caffeine     Drug use: No    Sexual activity: Defer       No Known Allergies    Past Surgical History:   Procedure Laterality Date    BACK SURGERY      LUMBAR DISC X2. NO HARDWARE    CARPAL TUNNEL RELEASE WITH CUBITAL TUNNEL RELEASE Right     COLONOSCOPY      COLONOSCOPY N/A 07/30/2021    Procedure: COLONOSCOPY;  Surgeon: Flor Maciel MD;  [place] [facility] [place];  Service: Gastroenterology;  Laterality: N/A;  Diverticulosis    EYE SURGERY Left     Left Eye    HERNIA REPAIR  2016    LUMBAR SPINE SURGERY      x3 in 2000. 2010, 2011    REVISION TOTAL KNEE ARTHROPLASTY Right     TOTAL HIP ARTHROPLASTY Right 05/28/2019    Procedure: RIGHT TOTAL HIP ARTHROPLASTY;  Surgeon: Harish Dominguez MD;  Location: [facility] MAIN OR;  Service: Orthopedics    TOTAL KNEE ARTHROPLASTY Right     TOTAL KNEE ARTHROPLASTY Left 04/25/2024    Procedure: " TOTAL KNEE ARTHROPLASTY;  Surgeon: Harish Dominguez MD;  [place]  [facility] [place]  Service: Orthopedics;  Laterality: Left;    VENOUS ACCESS DEVICE (PORT) INSERTION Right 8/23/2024    Procedure: INSERTION VENOUS ACCESS DEVICE;  Surgeon: Sal Wilhelm MD;  Location: [facility] MAIN OR;  Service: General;  Laterality: Right;       Procedures       Objective:         Vitals:    01/10/25 0958   BP: 130/80   Pulse: 68   SpO2: 97%       PHYSICAL EXAM:  GEN: well appearing, in NAD   HEENT: NCAT, EOMI, moist mucus membranes   Respiratory: CTAB, no wheezes, rales or rhonchi  CV: normal rate, regular rhythm, normal S1, S2, no murmurs, rubs, gallops, +2 radial pulses b/l  GI: soft, nontender, nondistended  MSK: no edema  Skin: no rash, warm, dry  Heme/Lymph: no bruising or bleeding  Neuro: Alert and Oriented x 3, grossly normal motor function        Assessment:         (I48.0) PAF (paroxysmal atrial fibrillation)    (I26.93) Single subsegmental pulmonary embolism without acute cor pulmonale    64 y.o. male with a history of A-fib, hypertension, metastatic melanoma, recent diagnosis of small nonocclusive PE in the right lower lobe segmental artery in early December during presentation for tachycardia who presents for follow-up.       Plan:       #Small PE  Seen on CT chest in early December.  -Continue Eliquis 5 mg twice daily    #A-fib  [id number] is 2.  -Continue metoprolol tartrate 25 mg twice daily, apixaban 5 mg twice daily    Eliud Blake MD, thank you very much for referring this kind patient to me. Please call me with any questions or concerns. I will see the patient again in the office in 6 months or earlier as needed.         Aguilar Lopez MD, [facility], [facility]  01/10/25  [facility]    Outpatient Encounter Medications as of 1/10/2025   Medication Sig Dispense Refill    acetaminophen (TYLENOL) 325 MG tablet Take 2 tablets by mouth Every 6 (Six) Hours As Needed for Mild Pain or Headache.       albuterol (PROVENTIL HFA;VENTOLIN HFA) 108 (90 BASE) MCG/ACT inhaler Inhale 2 puffs every 4 (four) hours as needed for wheezing. 1 inhaler 3    albuterol (PROVENTIL) (2.5 MG/3ML) 0.083% nebulizer solution Inhale 2.5 mg.      allopurinol (ZYLOPRIM) 100 MG tablet Take 1 tablet by mouth 2 (Two) Times a Day.      amLODIPine (NORVASC) 10 MG tablet Take 1 tablet by mouth Daily.  6    apixaban (ELIQUIS) 5 MG tablet tablet Take 1 tablet by mouth 2 (Two) Times a Day. 60 tablet 3    cholecalciferol (VITAMIN D3) 25 MCG (1000 UT) tablet Take 1 tablet by mouth Daily.      Cyanocobalamin (VITAMIN B-12 PO) Take  by mouth.      diazePAM (VALIUM) 5 MG tablet       gabapentin (NEURONTIN) 100 MG capsule Take 1 capsule by mouth 3 (Three) Times a Day.      HYDROcodone-acetaminophen (NORCO) 5-325 MG per tablet Take 1 tablet by mouth Every 8 (Eight) Hours As Needed for Moderate Pain. 60 tablet 0    hydrOXYzine (ATARAX) 25 MG tablet Take 1 tablet by mouth 3 (Three) Times a Day As Needed for Itching. 90 tablet 2    lidocaine-prilocaine (EMLA) 2.5-2.5 % cream Apply nickel-sized amount over Mediport site 30 min prior to access. Do not rub in. 30 g 2    methocarbamol (ROBAXIN) 750 MG tablet Take 1 tablet by mouth 3 (Three) Times a Day.      metoprolol tartrate (LOPRESSOR) 25 MG tablet Take 1 tablet by mouth Every 12 (Twelve) Hours. 180 tablet 3    Omega-3 Fatty Acids (fish oil) 1000 MG capsule capsule Take 1 capsule by mouth Daily With Breakfast. HOLD PER MD TRIPLETT      ondansetron (ZOFRAN) 8 MG tablet Take 1 tablet by mouth.      Pembrolizumab (Keytruda) 100 MG/4ML solution Infuse 8 mL into a venous catheter.      predniSONE (DELTASONE) 10 MG tablet Take 1 tablet by mouth Daily. 30 tablet 2    tamsulosin (FLOMAX) 0.4 MG capsule 24 hr capsule Take 1 capsule by mouth 2 (Two) Times a Day.      Trelegy Ellipta 200-62.5-25 MCG/ACT inhaler Inhale 1 puff Daily.      Vitamin A 7.5 MG (01510 UT) capsule 1 Cap, Oral, Cap, Daily, # 30 Cap, 0 Refill(s),  Pharmacy: [id number], 182.88 cm, 10/14/24 18:58:00 EDT, CLINICALHEIGHT, 96.36, kg, 10/14/24 18:58:00 EDT, CLINICALWEIGHT      vitamin D (ERGOCALCIFEROL) 1.25 MG (80372 UT) capsule capsule Take 1 capsule by mouth 1 (One) Time Per Week.      [DISCONTINUED] metoprolol tartrate (LOPRESSOR) 25 MG tablet Take 1 tablet by mouth Every 12 (Twelve) Hours. 60 tablet 0     No facility-administered encounter medications on file as of 1/10/2025.

## 2025-01-24 ENCOUNTER — INFUSION (OUTPATIENT)
Dept: ONCOLOGY | Facility: HOSPITAL | Age: 65
End: 2025-01-24
Payer: MEDICARE

## 2025-01-24 ENCOUNTER — OFFICE VISIT (OUTPATIENT)
Dept: ONCOLOGY | Facility: CLINIC | Age: 65
End: 2025-01-24
Payer: MEDICARE

## 2025-01-24 VITALS
SYSTOLIC BLOOD PRESSURE: 125 MMHG | RESPIRATION RATE: 17 BRPM | TEMPERATURE: 98.2 F | BODY MASS INDEX: 30.1 KG/M2 | HEIGHT: 72 IN | OXYGEN SATURATION: 96 % | WEIGHT: 222.2 LBS | HEART RATE: 69 BPM | DIASTOLIC BLOOD PRESSURE: 80 MMHG

## 2025-01-24 DIAGNOSIS — C79.51 SECONDARY CANCER OF BONE: ICD-10-CM

## 2025-01-24 DIAGNOSIS — C69.92 MALIGNANT NEOPLASM OF LEFT EYE: ICD-10-CM

## 2025-01-24 DIAGNOSIS — C69.40 MELANOMA OF UVEA METASTATIC TO LIVER: Primary | ICD-10-CM

## 2025-01-24 DIAGNOSIS — C78.7 MELANOMA OF UVEA METASTATIC TO LIVER: ICD-10-CM

## 2025-01-24 DIAGNOSIS — G89.3 CANCER RELATED PAIN: ICD-10-CM

## 2025-01-24 DIAGNOSIS — C69.32 MALIGNANT NEOPLASM OF LEFT CHOROID: ICD-10-CM

## 2025-01-24 DIAGNOSIS — Z79.899 ENCOUNTER FOR LONG-TERM (CURRENT) USE OF HIGH-RISK MEDICATION: ICD-10-CM

## 2025-01-24 DIAGNOSIS — C78.7 MELANOMA OF UVEA METASTATIC TO LIVER: Primary | ICD-10-CM

## 2025-01-24 DIAGNOSIS — C69.40 MELANOMA OF UVEA METASTATIC TO LIVER: ICD-10-CM

## 2025-01-24 DIAGNOSIS — C78.7 CANCER, METASTATIC TO LIVER: ICD-10-CM

## 2025-01-24 DIAGNOSIS — C78.7 CANCER, METASTATIC TO LIVER: Primary | ICD-10-CM

## 2025-01-24 LAB
ALBUMIN SERPL-MCNC: 3.5 G/DL (ref 3.5–5.2)
ALBUMIN/GLOB SERPL: 1.3 G/DL
ALP SERPL-CCNC: 74 U/L (ref 39–117)
ALT SERPL W P-5'-P-CCNC: 17 U/L (ref 1–41)
ANION GAP SERPL CALCULATED.3IONS-SCNC: 11.2 MMOL/L (ref 5–15)
AST SERPL-CCNC: 21 U/L (ref 1–40)
BASOPHILS # BLD AUTO: 0.1 10*3/MM3 (ref 0–0.2)
BASOPHILS NFR BLD AUTO: 0.8 % (ref 0–1.5)
BILIRUB SERPL-MCNC: 0.2 MG/DL (ref 0–1.2)
BUN SERPL-MCNC: 21 MG/DL (ref 8–23)
BUN/CREAT SERPL: 18.6 (ref 7–25)
CALCIUM SPEC-SCNC: 8.5 MG/DL (ref 8.6–10.5)
CHLORIDE SERPL-SCNC: 106 MMOL/L (ref 98–107)
CO2 SERPL-SCNC: 21.8 MMOL/L (ref 22–29)
CREAT SERPL-MCNC: 1.13 MG/DL (ref 0.76–1.27)
DEPRECATED RDW RBC AUTO: 49.5 FL (ref 37–54)
EGFRCR SERPLBLD CKD-EPI 2021: 72.1 ML/MIN/1.73
EOSINOPHIL # BLD AUTO: 0.62 10*3/MM3 (ref 0–0.4)
EOSINOPHIL NFR BLD AUTO: 5.3 % (ref 0.3–6.2)
ERYTHROCYTE [DISTWIDTH] IN BLOOD BY AUTOMATED COUNT: 14.5 % (ref 12.3–15.4)
GLOBULIN UR ELPH-MCNC: 2.8 GM/DL
GLUCOSE SERPL-MCNC: 129 MG/DL (ref 65–99)
HCT VFR BLD AUTO: 43.3 % (ref 37.5–51)
HGB BLD-MCNC: 13.6 G/DL (ref 13–17.7)
IMM GRANULOCYTES # BLD AUTO: 0.04 10*3/MM3 (ref 0–0.05)
IMM GRANULOCYTES NFR BLD AUTO: 0.3 % (ref 0–0.5)
LYMPHOCYTES # BLD AUTO: 3.04 10*3/MM3 (ref 0.7–3.1)
LYMPHOCYTES NFR BLD AUTO: 25.8 % (ref 19.6–45.3)
MAGNESIUM SERPL-MCNC: 2.3 MG/DL (ref 1.6–2.4)
MCH RBC QN AUTO: 29.1 PG (ref 26.6–33)
MCHC RBC AUTO-ENTMCNC: 31.4 G/DL (ref 31.5–35.7)
MCV RBC AUTO: 92.5 FL (ref 79–97)
MONOCYTES # BLD AUTO: 1.1 10*3/MM3 (ref 0.1–0.9)
MONOCYTES NFR BLD AUTO: 9.3 % (ref 5–12)
NEUTROPHILS NFR BLD AUTO: 58.5 % (ref 42.7–76)
NEUTROPHILS NFR BLD AUTO: 6.9 10*3/MM3 (ref 1.7–7)
NRBC BLD AUTO-RTO: 0 /100 WBC (ref 0–0.2)
PHOSPHATE SERPL-MCNC: 3.5 MG/DL (ref 2.5–4.5)
PLATELET # BLD AUTO: 306 10*3/MM3 (ref 140–450)
PMV BLD AUTO: 9.1 FL (ref 6–12)
POTASSIUM SERPL-SCNC: 4.3 MMOL/L (ref 3.5–5.2)
PROT SERPL-MCNC: 6.3 G/DL (ref 6–8.5)
RBC # BLD AUTO: 4.68 10*6/MM3 (ref 4.14–5.8)
SODIUM SERPL-SCNC: 139 MMOL/L (ref 136–145)
WBC NRBC COR # BLD AUTO: 11.8 10*3/MM3 (ref 3.4–10.8)

## 2025-01-24 PROCEDURE — 96413 CHEMO IV INFUSION 1 HR: CPT

## 2025-01-24 PROCEDURE — 84100 ASSAY OF PHOSPHORUS: CPT

## 2025-01-24 PROCEDURE — 96375 TX/PRO/DX INJ NEW DRUG ADDON: CPT

## 2025-01-24 PROCEDURE — 83735 ASSAY OF MAGNESIUM: CPT

## 2025-01-24 PROCEDURE — 25010000002 PEMBROLIZUMAB 100 MG/4ML SOLUTION 4 ML VIAL: Performed by: NURSE PRACTITIONER

## 2025-01-24 PROCEDURE — 25010000002 ZOLEDRONIC ACID 4 MG/100ML SOLUTION: Performed by: NURSE PRACTITIONER

## 2025-01-24 PROCEDURE — 36415 COLL VENOUS BLD VENIPUNCTURE: CPT

## 2025-01-24 PROCEDURE — 80053 COMPREHEN METABOLIC PANEL: CPT

## 2025-01-24 PROCEDURE — 85025 COMPLETE CBC W/AUTO DIFF WBC: CPT

## 2025-01-24 RX ORDER — PREDNISONE 10 MG/1
10 TABLET ORAL DAILY
Qty: 90 TABLET | Refills: 0 | Status: SHIPPED | OUTPATIENT
Start: 2025-01-24

## 2025-01-24 RX ORDER — HYDROCODONE BITARTRATE AND ACETAMINOPHEN 5; 325 MG/1; MG/1
1 TABLET ORAL EVERY 8 HOURS PRN
Qty: 60 TABLET | Refills: 0 | Status: SHIPPED | OUTPATIENT
Start: 2025-01-24

## 2025-01-24 RX ORDER — SODIUM CHLORIDE 9 MG/ML
20 INJECTION, SOLUTION INTRAVENOUS ONCE
Status: CANCELLED | OUTPATIENT
Start: 2025-01-24

## 2025-01-24 RX ORDER — ZOLEDRONIC ACID 0.04 MG/ML
4 INJECTION, SOLUTION INTRAVENOUS ONCE
Status: COMPLETED | OUTPATIENT
Start: 2025-01-24 | End: 2025-01-24

## 2025-01-24 RX ADMIN — SODIUM CHLORIDE 200 MG: 900 INJECTION, SOLUTION INTRAVENOUS at 12:11

## 2025-01-24 RX ADMIN — ZOLEDRONIC ACID 4 MG: 0.04 INJECTION, SOLUTION INTRAVENOUS at 12:41

## 2025-01-24 NOTE — PROGRESS NOTES
"REASON FOR FOLLOW-UP:     Provide an opinion on any further workup or treatment of metastatic melanoma.                             HISTORY OF PRESENT ILLNESS:   History of Present Illness  Patient returns today for his eighth cycle of pembrolizumab.    He experienced an episode of atrial fibrillation approximately 6 weeks ago, but his cardiac function has since improved. He also had small blood clots in his lungs. He notes that his white blood cell count has been elevated for some time, but he continues to feel well.  Continues to take prednisone 10 mg as needed.    Denies difficulty with his bowels.        Results        Past Medical History:   Diagnosis Date    Acute renal failure (ARF) 09/2018    RESOLVED AFTER DEHYDRATION RESOLVED    Anemia     Arthritis     OA AND RA    Asthma     Atrial fibrillation with rapid ventricular response     Bone cancer     BPH (benign prostatic hyperplasia)     Chronic back pain     CKD (chronic kidney disease)     Stage 3    COPD (chronic obstructive pulmonary disease)     Coronary artery calcification seen on CAT scan     Eye cancer, left     TREATMENT WITH RADIATION. SOME VISON LOSS LEFT EYE    Gout     Hepatitis C     IN REMISSION. BEEN LONGER THAN 5 YRS    Hiatal hernia     History of atrial fibrillation     SEPT 2018. WITH SEVERE DEHYDRATION    History of blood transfusion 1978    in [place], no known reaction mild shortness of breath    Hypertension     Liver cancer     Lung cancer     MRSA infection     HX OF    Pneumonia     Psoriasis     Right hip pain     Short of breath on exertion     Sleep apnea     \"MILD\". DID NOT FOLLOW THUR GETTING MACHINE RELATED TO COST OF MACHINE    Syncope 09/2018    secondary to atrial fibrillation with rapid ventricular response. SEVERELY DEHYDRATED AT THIS TIME.    Viral hepatitis     Vision loss of left eye     EYE CANCER    Vitamin D deficiency      Past Surgical History:   Procedure Laterality Date    BACK SURGERY      LUMBAR DISC X2. " NO HARDWARE    CARPAL TUNNEL RELEASE WITH CUBITAL TUNNEL RELEASE Right     COLONOSCOPY      COLONOSCOPY N/A 07/30/2021    Procedure: COLONOSCOPY;  Surgeon: Flor Maciel MD;  [place] [facility] [place]  Service: Gastroenterology;  Laterality: N/A;  Diverticulosis    EYE SURGERY Left     Left Eye    HERNIA REPAIR  2016    LUMBAR SPINE SURGERY      x3 in 2000. 2010, 2011    REVISION TOTAL KNEE ARTHROPLASTY Right     TOTAL HIP ARTHROPLASTY Right 05/28/2019    Procedure: RIGHT TOTAL HIP ARTHROPLASTY;  Surgeon: Harish Dominguez MD;  Location: [facility] MAIN OR;  Service: Orthopedics    TOTAL KNEE ARTHROPLASTY Right     TOTAL KNEE ARTHROPLASTY Left 04/25/2024    Procedure: TOTAL KNEE ARTHROPLASTY;  Surgeon: Harish Dominguez MD;  Location: [facility] OR [facility];  Service: Orthopedics;  Laterality: Left;    VENOUS ACCESS DEVICE (PORT) INSERTION Right 8/23/2024    Procedure: INSERTION VENOUS ACCESS DEVICE;  Surgeon: Sal Wilhelm MD;  Location: [facility] MAIN OR;  Service: General;  Laterality: Right;         ONCOLOGY HISTORY:  The patient is a 63 y.o. year old male  who is here for initial evaluation on 7/25/2024 for evaluation of newly diagnosed metastatic melanoma.  This patient has end-stage arthritis required bilateral knee replacement, history of left eye melanoma, lumbar stenosis status post discectomy, hepatitis C, hypertension, and psoriasis.    His primary doctor found lung issues first.  Patient reports he was congested with a cough and dyspnea for about a month.  Patient went to see his primary care physician Dr. Joshua Blake on 4/17/2024 who prescribed doxycycline and had a chest ray examination which reported multiple pulmonary nodules.  He subsequently had a chest CT on 5/152024 which reported a 1.7 cm nodule opacity in the right lower lobe with additional scattered small bilateral pulmonary nodules.  It also reported slightly increased size of mildly asymmetric prominent left axillary lymph nodes.  There  is also mild asymmetric elevation of the left hemidiaphragm with a new segmental atelectasis and scarring at the left lung base.    Patient has subsequently had a PET scan examination at the [facility], and the reported the right lower lobe 1.6 cm solid nodule with a maximum SUV 3.6.  Multiple additional bilateral subcentimeter solid nodules too small for accurate PET characterization but mainly had uptake greater than background 11.  There was no enlarged hypermetabolic mediastinal or hilar lymph nodes.  In the abdomen, there were 2 dominant hypermetabolic hypodense right hepatic lesions with reference 2.7 cm with SUV 11.1.  There is at least 3 additional smaller hypermetabolic pedicle foci with the left hepatic lobe SUV 3.7.  The lesion in the caudate lobe had a maximum SUV 5.5.  There are multiple pulm lesions, the T6 lesion had SUV 10.8, and the right ilium lesion maximum SUV 10.7, and the right sacrum lesion SUV 9.7, and the left supra-acetabular ilium lesion with SUV 4.6.    The patient was seen by thoracic surgeon Dr. Riggs on 7/11/2024, Dr. Riggs recommended CT-guided core needle biopsy of the liver lesion.  This was done on 7/19/2024.  Pathology evaluation reported metastatic melanoma.     Patient reports today that he was diagnosed with melanoma in his left eye approximately 12 years ago during a routine eye exam. At that time, his eyesight was unimpaired, but a patch was placed in his eye. He spent about a week in a room following the procedure. He lost approximately 40 percent of his vision due to blockage. He received radiation therapy in [facility].     He experienced a minor headache a few weeks ago, which is unusual for him. He denies experiencing nausea, vomiting, or abdominal pain. His appetite is normal. He experienced weight loss of 10 pounds about 1 to 2 months ago, but has since regained it and is attempting to lose weight again.    He is scheduled for an MRI of the spine on  08/09/2024. His back pain, which he rates as 6 out of 10 at its worst, is located in the lower back at the sacrum area. He also reports weakness in his legs and is not currently taking any medication for the pain. He has consulted with a neurosurgeon and has informed them of his cancer diagnosis.    Laboratory Studies on 7/25/2024  Hemoglobin 13.7 MCV 92.4, platelets 282,000 WBC 9360 neutrophils 4490 lymphocytes 3170, monocytes 1000 eosinophil 540 basophil 100 and immature granulocytes 60.    Biopsy from the liver confirmed metastatic melanoma.    MEDICATIONS    Current Outpatient Medications:     acetaminophen (TYLENOL) 325 MG tablet, Take 2 tablets by mouth Every 6 (Six) Hours As Needed for Mild Pain or Headache., Disp: , Rfl:     albuterol (PROVENTIL HFA;VENTOLIN HFA) 108 (90 BASE) MCG/ACT inhaler, Inhale 2 puffs every 4 (four) hours as needed for wheezing., Disp: 1 inhaler, Rfl: 3    albuterol (PROVENTIL) (2.5 MG/3ML) 0.083% nebulizer solution, Inhale 2.5 mg., Disp: , Rfl:     allopurinol (ZYLOPRIM) 100 MG tablet, Take 1 tablet by mouth 2 (Two) Times a Day., Disp: , Rfl:     amLODIPine (NORVASC) 10 MG tablet, Take 1 tablet by mouth Daily., Disp: , Rfl: 6    apixaban (ELIQUIS) 5 MG tablet tablet, Take 1 tablet by mouth 2 (Two) Times a Day., Disp: 60 tablet, Rfl: 3    cholecalciferol (VITAMIN D3) 25 MCG (1000 UT) tablet, Take 1 tablet by mouth Daily., Disp: , Rfl:     Cyanocobalamin (VITAMIN B-12 PO), Take  by mouth., Disp: , Rfl:     diazePAM (VALIUM) 5 MG tablet, , Disp: , Rfl:     gabapentin (NEURONTIN) 100 MG capsule, Take 1 capsule by mouth 3 (Three) Times a Day., Disp: , Rfl:     HYDROcodone-acetaminophen (NORCO) 5-325 MG per tablet, Take 1 tablet by mouth Every 8 (Eight) Hours As Needed for Moderate Pain., Disp: 60 tablet, Rfl: 0    hydrOXYzine (ATARAX) 25 MG tablet, Take 1 tablet by mouth 3 (Three) Times a Day As Needed for Itching., Disp: 90 tablet, Rfl: 2    lidocaine-prilocaine (EMLA) 2.5-2.5 % cream,  Apply nickel-sized amount over Mediport site 30 min prior to access. Do not rub in., Disp: 30 g, Rfl: 2    methocarbamol (ROBAXIN) 750 MG tablet, Take 1 tablet by mouth 3 (Three) Times a Day., Disp: , Rfl:     metoprolol tartrate (LOPRESSOR) 25 MG tablet, Take 1 tablet by mouth Every 12 (Twelve) Hours., Disp: 180 tablet, Rfl: 3    Omega-3 Fatty Acids (fish oil) 1000 MG capsule capsule, Take 1 capsule by mouth Daily With Breakfast. HOLD PER MD INSTR, Disp: , Rfl:     ondansetron (ZOFRAN) 8 MG tablet, Take 1 tablet by mouth., Disp: , Rfl:     Pembrolizumab (Keytruda) 100 MG/4ML solution, Infuse 8 mL into a venous catheter., Disp: , Rfl:     predniSONE (DELTASONE) 10 MG tablet, Take 1 tablet by mouth Daily., Disp: 30 tablet, Rfl: 2    tamsulosin (FLOMAX) 0.4 MG capsule 24 hr capsule, Take 1 capsule by mouth 2 (Two) Times a Day., Disp: , Rfl:     Trelegy Ellipta 200-62.5-25 MCG/ACT inhaler, Inhale 1 puff Daily., Disp: , Rfl:     Vitamin A 7.5 MG (60095 UT) capsule, 1 Cap, Oral, Cap, Daily, # 30 Cap, 0 Refill(s), Pharmacy: [id number], 182.88 cm, 10/14/24 18:58:00 EDT, CLINICALHEIGHT, 96.36, kg, 10/14/24 18:58:00 EDT, CLINICALWEIGHT, Disp: , Rfl:     vitamin D (ERGOCALCIFEROL) 1.25 MG (51654 UT) capsule capsule, Take 1 capsule by mouth 1 (One) Time Per Week., Disp: , Rfl:     ALLERGIES:   No Known Allergies    SOCIAL HISTORY:       Social History     Socioeconomic History    Marital status: Single   Tobacco Use    Smoking status: Never     Passive exposure: Never    Smokeless tobacco: Never   Vaping Use    Vaping status: Never Used   Substance and Sexual Activity    Alcohol use: No     Comment: no caffeine     Drug use: No    Sexual activity: Defer   He is a  but has never smoked. He used to drink 2 drinks a day, but he has not drunk in over 20 to 25 years.      FAMILY HISTORY:  Family History   Problem Relation Age of Onset    Depression Mother     COPD Mother     Mental illness Mother      "Diabetes Father     Hypertension Father     Heart disease Father     Hyperlipidemia Father     Cancer Father     Liver cancer Father     Drug abuse Neg Hx     Allergies Neg Hx     Asthma Neg Hx     Stroke Neg Hx     Malig Hyperthermia Neg Hx    His father had liver, bone cancer. He  of the cancer.  No details available.         Vitals:    25 1122   BP: 125/80   Pulse: 69   Resp: 17   Temp: 98.2 °F (36.8 °C)   TempSrc: Oral   SpO2: 96%   Weight: 101 kg (222 lb 3.2 oz)   Height: 182.9 cm (72.01\")   PainSc:   4   PainLoc: Back         2025    11:23 AM   Current Status   ECOG score 0       Physical Exam  Constitutional:       General: He is not in acute distress.     Appearance: Normal appearance.   Eyes:      Extraocular Movements: Extraocular movements intact.      Conjunctiva/sclera: Conjunctivae normal.   Cardiovascular:      Rate and Rhythm: Normal rate and regular rhythm.      Heart sounds: Normal heart sounds.   Pulmonary:      Effort: Pulmonary effort is normal.      Breath sounds: Normal breath sounds.   Abdominal:      General: Bowel sounds are normal.      Palpations: Abdomen is soft.      Tenderness: There is no abdominal tenderness.   Musculoskeletal:      Right lower leg: No edema.      Left lower leg: No edema.      Comments: Uses cane for ambulation   Lymphadenopathy:      Cervical: No cervical adenopathy.   Skin:     General: Skin is warm and dry.      Findings: No rash.   Neurological:      Mental Status: He is alert. Mental status is at baseline.   Psychiatric:         Behavior: Behavior normal.         Thought Content: Thought content normal.       RECENT LABS:  Results from last 7 days   Lab Units 25  1101   WBC 10*3/mm3 11.80*   NEUTROS ABS 10*3/mm3 6.90   HEMOGLOBIN g/dL 13.6   HEMATOCRIT % 43.3   PLATELETS 10*3/mm3 306       Results from last 7 days   Lab Units 25  1101   SODIUM mmol/L 139   POTASSIUM mmol/L 4.3   CHLORIDE mmol/L 106   CO2 mmol/L 21.8*   BUN mg/dL 21 "   CREATININE mg/dL 1.13   CALCIUM mg/dL 8.5*   ALBUMIN g/dL 3.5   BILIRUBIN mg/dL 0.2   ALK PHOS U/L 74   ALT (SGPT) U/L 17   AST (SGOT) U/L 21   GLUCOSE mg/dL 129*           IMAGING:  Adult Transthoracic Echo Complete W/ Cont if Necessary Per Protocol    Left ventricular systolic function is normal. Calculated left   ventricular EF = 52.9% Normal left ventricular cavity size noted. Left   ventricular wall thickness is consistent with moderate concentric   hypertrophy. All left ventricular wall segments contract normally. Left   ventricular diastolic function was indeterminate.    Normal right ventricular cavity size, systolic function and septal   motion noted. Right ventricular wall thickness is consistent with mild   hypertrophy.    The left atrial cavity is mildly dilated.    Trace mitral valve regurgitation is present.    Mild tricuspid valve regurgitation is present. Estimated right   ventricular systolic pressure from tricuspid regurgitation is normal (<35   mmHg). Calculated right ventricular systolic pressure from tricuspid   regurgitation is 28 mmHg.  CT Angiogram Chest  CT ANGIOGRAM OF THE CHEST. MULTIPLE CORONAL, SAGITTAL, AND 3-D  RECONSTRUCTIONS.     HISTORY: 64-year-old male with atrial fibrillation. Recently diagnosed  with COVID. Metastatic uveal melanoma. Lung, liver, and bone metastases.     TECHNIQUE: Radiation dose reduction techniques were utilized, including  automated exposure control and exposure modulation based on body size.   CT angiogram of the chest was performed with 2mm images following the  administration of IV contrast. Multiple coronal, sagittal, and 3-D  reconstruction images were obtained. Compared with CT sequence of PET/CT  11/12/2024.     FINDINGS:  1. There is a very small nonocclusive pulmonary thromboembolus at a  right lower lobe segmental pulmonary artery extending into a  subsegmental branch. There are no other definitive pulmonary  thromboemboli. RV to LV ratio is less  than 1.     2. The 1.3 cm pleural nodule at the right lower lobe is stable. There  are multiple bilateral pulmonary nodules which are without significant  change since the PET/CT given constraints of extensive breathing  artifact on the PET/CT. There are no pleural or pericardial effusions.        This report was finalized on 12/9/2024 2:27 PM by Dr. Anneliese Valdes M.D on  Workstation: [id number]       Assessment & Plan   Assessment & Plan       1. Metastatic melanoma in the liver, bilateral lungs and bones.  Patient has a history of left eye melanoma 12 years ago.  Patient had a cough and x-ray subsequently chest CT scan 7/15/2024 showed left lower lobe pulmonary nodule 1.7 cm, and multiple other smaller pulmonary nodules.  PET scan on 7/31/2024 reported multiple hypermetabolic lesions. The patient also has multiple metastatic hypermetabolic bone lesions including the T6 vertebral body and also the bilateral iliac wings and also the left sacrum close to the SI joint.   Patient had a liver biopsy confirmed to be metastatic melanoma.  I discussed with the patient the on 7/25/2024 that I recommended to test his tumor sample by [employer] NGS study to see if the patient will have BRAF mutation so he would be a candidate for oral TKI treatment. Then, we also would look into whether this patient would be a candidate for other targeted therapy as well as immunotherapy.   Arrangements will be made for brain MRI with and without contrast.  MRI on 8/5/2024 reported no evidence for intracranial metastatic disease.  [employer] NGS study reported stable MSI and TMB at 2.1 m/MB, negative mutation for BRAF, NRAS and KIT.  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   Brain MRI examination on 8/5/2024 reported no evidence for intracranial metastatic disease.  Discussed with the patient on 8/14/2024, he is not a candidate for targeted therapy using oral TKI due to lack of mutation  from BRAF NRAS and KIT.  I discussed with the patient for immunotherapy.  We discussed the single agent pembrolizumab versus doublets using nivolumab plus ipilimumab.  Due to the side effects profile, I recommended using a single agent pembrolizumab every 3 weeks due to its better tolerance.  Patient is agreeable.   Had first cycle immunotherapy pembrolizumab 8/27/2024.  Patient presented for evaluation on 9/17/2024, he reports experiencing some skin pruritus without any visible rashes. He also reports improved energy and appetite from the low-dose prednisone 10 mg daily. He denies any nausea, vomiting, or diarrhea. He has been using his inhaler occasionally for mild shortness of breath, which is not a new symptom. Continue current immunotherapy with pembrolizumab.   10/8/2024: Proceed with Cycle #3 Keytruda. Tolerating well overall.   10/29/2024: Proceed with Cycle #4 Keytruda. Tolerating well.    After 4 cycles of pembrolizumab, the PET scan revealed a mixed response, predominantly showing significant reduction in metastatic liver lesions and resolution of two small liver metastases. Notable improvement was also observed in some bone areas. However, two new small lesions were detected on the right chest wall. The images were reviewed with the patient and his wife, and it was recommended to continue pembrolizumab for another 4 cycles, followed by PET scan again. If the two areas on the right chest wall continue to grow while other areas respond, local radiation therapy will be considered.   1/24/2025 patient returns for cycle 8 pembrolizumab which is continued every 3 weeks.  He will be due for scans following this cycle and then follow-up with Dr. Vanegas.      2.  Metastatic cancer to the bone.   PET scan examination showed multiple bone lesions.    Recommended to start the patient on IV Zometa no more than every 4 weeks.  Patient received first dose of Zometa on 7/31/2024.  Had good tolerance.  10/29/2024 Zometa  was given and to be given every 6 weeks.   1/24/2025 patient continue Zometa which is continued every 6 weeks and due today    3.  Back pain.  PET scan examination showed multiple bone lesions including T6 vertebral body, and the sacrum and the pelvic bone..    The patient reports low back pain and I looked at the PET scan images. I do not think necessarily the small left SI joint area bone lesion causing the problem. He reports that he was seen by neurosurgeon and is scheduled to have a spine MRI examination on 08/09/2024.  I certainly would like to see the results of that to make final assessment, to see whether this patient would benefit from some radiation therapy or he has lower back pain from other course. He previously had multiple lumbar spine surgeries for pinched nerve/discectomy.  MRI for T/L spine examination at the [facility] 8/9/2024 for new reported no evidence for metastatic disease.  There were extensive multilevel spondylosis in the T-spine and the moderate narrowing of the spinal canal C7-T1 and T4-T5.  The L-spine MRI examination reported marrow replacing lesion in the left sacral gale 1.2 cm, and also the lesion in the left posterior iliac bone measuring 1.7 cm.  There was also evidence of postsurgical changes.   11/19/2024 3 He continues to experience low back pain on the left side. He reported taking three doses of meloxicam, which may have contributed to his renal injury. He was advised against further use of meloxicam, and he expressed understanding.  Today Renzo Streeter reports a pain score of 4.  Given his pain assessment as noted, treatment options were discussed and the following options were decided upon as a follow-up plan to address the patient's pain: continuation of current treatment plan for pain    4. Generalized pain.  9/17/2024 he reports pain in his knees, back, and other areas. He has been taking Motrin for pain relief, approximately four times a day. Continue with  Motrin as needed for pain management. If pain persists or worsens, consider alternative pain management strategies.  10/8/2024: He takes norco about once a week. Pain very well controlled.   Renzo Streeter reports a pain score of 4.  Given his pain assessment as noted, treatment options were discussed and the following options were decided upon as a follow-up plan to address the patient's pain: continuation of current treatment plan for pain.      5. Mild leukocytosis.  Laboratory studies on 09/17/2024 show mild leukocytosis with WBC of 11,070. ANC 5660, eosinophils 820, lymphocytes 3440, and monocytes 990. Hemoglobin is normal at 14.2, and platelets are 243,000. CMP, liver function panel, electrolytes, and glucose are all normal. No immediate intervention is required; continue to monitor blood counts.  11/19/2024 lab study showed a slight improvement in leukocytosis, which is currently only marginally elevated at 10,850, with mostly elevated monocytes 1360 and eosinophils 620, but normal neutrophils and lymphocytes.  Ongoing leukocytosis noted today, 12/13/2024 with WBC 12.58.   12/30/2024 WBC 13,500, including ANC 8730.  This is probably related to steroids intra-articular injection.  Patient is afebrile.  No signs for infection. with an increase in eosinophils to 690, which could be due to seasonal allergies or a reaction to medications.   24 2025 WBC 11.8, ANC 6.9    6.  Vascular access.  Patient had a portacatheter placed by Dr. Wilhelm 8/23/2024.    7. Lacerated wrist   10/29/2024: He suffered a skill saw accident at work and lacerated his wrist. He underwent surgery for debridement on 10/18/2024 and has been recovering well. Photo uploaded in chart today as the wound looks dehisced. He is scheduled for post op appt tomorrow for ongoing management and further recommendations.   11/19/2024 he reports improvement in his right wrist. He will continue to follow up with the hand surgeon and keep the scheduled  appointment.  This continues to improve.    No active problem today.    8. Acute renal injury.  11/19/2024 renal function has significantly deteriorated, with a creatinine level of 2.29 and a BUN of 47, likely due to dehydration. He admitted to reduced water intake. Intravenous hydration with 1 L normal saline was recommended daily, and Keytruda was postponed until the coming Friday, with the hope of improved renal function by then. A lab study will be conducted this Friday prior to Keytruda administration. He was advised to increase water intake to maintain hydration at home.  Creatinine is normal today at 1.13    9.  New pulmonary emboli   Hospitalization 12/8/2024 through 12/9/2024 secondary to new atrial fibrillation  CT angiogram noted a very small nonocclusive pulmonary.  Patient placed on Eliquis starter pack  Currently on Eliquis 10 mg twice daily.  He understands after completing 1 week of Eliquis he would reduce to 5 mg twice daily.  We reviewed signs and symptoms of bleeding for which to monitor for.   24 2025 patient continues Eliquis and denies bleeding or bruising.    10.  Atrial fibrillation  Hospitalization 12/8/2024 through 12/9/2024 secondary to new atrial fibrillation  Evaluation by cardiology with patient to begin metoprolol  Patient is on Eliquis anticoagulation.    11. Depression.  Today 12/30/2024 he reports feeling depressed occasionally, especially when he stays in bed for extended periods. He is not currently on any medication for depression. He is encouraged to engage in physical activity and exercise to help alleviate symptoms.   Patient screened positive for depression based on a PHQ-9 score of  on . Follow-up recommendations include: Suicide Risk Assessment performed.       PLAN:   Proceed with pembrolizumab today  Continue Eliquis 5 mg twice a day for anticoagulation.  Continue Norco every 8 hours as needed for pain control.  Continue management of hypertension and followed by primary  physician.  There was admitted today  PET scan in 1 week   Dr. Ellis in 3 weeks for evaluation to discuss the results of PET scan examination, he will be booked for treatment unless the PET scan indicates a need to change medication.    Patient is on a high risk medication requiring close monitoring for toxicity.             Mady Anaya, APRN  01/24/2025

## 2025-01-28 ENCOUNTER — PATIENT OUTREACH (OUTPATIENT)
Dept: OTHER | Facility: HOSPITAL | Age: 65
End: 2025-01-28
Payer: MEDICARE

## 2025-01-28 NOTE — PROGRESS NOTES
Reviewed chart. Patient with metastatic melanoma.  Saw onc APRN 1/24/25, Rec'd cycle 8 pembrolizumab which is continued every 3 weeks.  PET 2/7, sees Dr. Ellis 2/14    Called patient. Left message that I was just touching base to see how he was doing with treatment. Wanted to know if he had any questions/concerns or resource/supportive care needs; requested cb

## 2025-01-31 ENCOUNTER — TELEPHONE (OUTPATIENT)
Dept: ONCOLOGY | Facility: CLINIC | Age: 65
End: 2025-01-31
Payer: MEDICARE

## 2025-01-31 NOTE — TELEPHONE ENCOUNTER
Spoke to [facility], they states they needed the patient's SSN. I called patient to inform him of this. NO answer message left via VM.

## 2025-01-31 NOTE — TELEPHONE ENCOUNTER
Caller: Jamaal Streeter    Relationship: Self    Best call back number: [phone]    What is the best time to reach you: ANY    Who are you requesting to speak with (clinical staff, provider,  specific staff member): CLINICAL     What was the call regarding: JAMAAL SPOKE TO [facility] THEY TOLD HIM THAT THE OFFICE NEEDED TO CALL THEM TO OKAY THE MEDICATION HYDROcodone-acetaminophen (NORCO) 5-325 MG per tablet  BEFORE THEY SEND IT  HERE IS THE NUMBER THEY PROVIDED # [phone]      PLEASE ADVISE

## 2025-02-07 ENCOUNTER — HOSPITAL ENCOUNTER (OUTPATIENT)
Dept: PET IMAGING | Facility: HOSPITAL | Age: 65
Discharge: HOME | End: 2025-02-07
Payer: MEDICARE

## 2025-02-07 DIAGNOSIS — C78.7 MELANOMA OF UVEA METASTATIC TO LIVER: ICD-10-CM

## 2025-02-07 DIAGNOSIS — C69.40 MELANOMA OF UVEA METASTATIC TO LIVER: ICD-10-CM

## 2025-02-07 LAB — GLUCOSE BLDC GLUCOMTR-MCNC: 83 MG/DL (ref 70–130)

## 2025-02-07 PROCEDURE — A9552 F18 FDG: HCPCS | Performed by: INTERNAL MEDICINE

## 2025-02-07 PROCEDURE — 78816 PET IMAGE W/CT FULL BODY: CPT

## 2025-02-07 PROCEDURE — 34310000005 FLUDEOXYGLUCOSE F18 SOLUTION: Performed by: INTERNAL MEDICINE

## 2025-02-07 PROCEDURE — 82948 REAGENT STRIP/BLOOD GLUCOSE: CPT

## 2025-02-07 RX ADMIN — FLUDEOXYGLUCOSE F 18 1 DOSE: 200 INJECTION, SOLUTION INTRAVENOUS at 11:04

## 2025-02-14 ENCOUNTER — INFUSION (OUTPATIENT)
Dept: ONCOLOGY | Facility: HOSPITAL | Age: 65
End: 2025-02-14
Payer: MEDICARE

## 2025-02-14 ENCOUNTER — OFFICE VISIT (OUTPATIENT)
Dept: ONCOLOGY | Facility: CLINIC | Age: 65
End: 2025-02-14
Payer: MEDICARE

## 2025-02-14 VITALS
HEIGHT: 72 IN | TEMPERATURE: 97.5 F | OXYGEN SATURATION: 95 % | WEIGHT: 221.6 LBS | SYSTOLIC BLOOD PRESSURE: 104 MMHG | HEART RATE: 46 BPM | DIASTOLIC BLOOD PRESSURE: 52 MMHG | BODY MASS INDEX: 30.02 KG/M2

## 2025-02-14 DIAGNOSIS — D72.823 LEUKEMOID REACTION: ICD-10-CM

## 2025-02-14 DIAGNOSIS — C78.7 MELANOMA OF UVEA METASTATIC TO LIVER: Primary | ICD-10-CM

## 2025-02-14 DIAGNOSIS — Z45.2 FITTING AND ADJUSTMENT OF VASCULAR CATHETER: ICD-10-CM

## 2025-02-14 DIAGNOSIS — C69.40 MELANOMA OF UVEA METASTATIC TO LIVER: Primary | ICD-10-CM

## 2025-02-14 DIAGNOSIS — Z79.899 ENCOUNTER FOR LONG-TERM (CURRENT) USE OF HIGH-RISK MEDICATION: ICD-10-CM

## 2025-02-14 DIAGNOSIS — C78.7 MELANOMA OF UVEA METASTATIC TO LIVER: ICD-10-CM

## 2025-02-14 DIAGNOSIS — G89.3 CANCER RELATED PAIN: ICD-10-CM

## 2025-02-14 DIAGNOSIS — C78.7 CANCER, METASTATIC TO LIVER: ICD-10-CM

## 2025-02-14 DIAGNOSIS — C69.40 MELANOMA OF UVEA METASTATIC TO LIVER: ICD-10-CM

## 2025-02-14 DIAGNOSIS — C79.51 SECONDARY CANCER OF BONE: ICD-10-CM

## 2025-02-14 DIAGNOSIS — C69.92 MALIGNANT NEOPLASM OF LEFT EYE: ICD-10-CM

## 2025-02-14 LAB
ALBUMIN SERPL-MCNC: 3.8 G/DL (ref 3.5–5.2)
ALBUMIN/GLOB SERPL: 1.5 G/DL
ALP SERPL-CCNC: 68 U/L (ref 39–117)
ALT SERPL W P-5'-P-CCNC: 17 U/L (ref 1–41)
ANION GAP SERPL CALCULATED.3IONS-SCNC: 11.5 MMOL/L (ref 5–15)
AST SERPL-CCNC: 22 U/L (ref 1–40)
BASOPHILS # BLD AUTO: 0.08 10*3/MM3 (ref 0–0.2)
BASOPHILS NFR BLD AUTO: 0.7 % (ref 0–1.5)
BILIRUB SERPL-MCNC: 0.4 MG/DL (ref 0–1.2)
BUN SERPL-MCNC: 24 MG/DL (ref 8–23)
BUN/CREAT SERPL: 22 (ref 7–25)
CALCIUM SPEC-SCNC: 9.2 MG/DL (ref 8.6–10.5)
CHLORIDE SERPL-SCNC: 104 MMOL/L (ref 98–107)
CO2 SERPL-SCNC: 22.5 MMOL/L (ref 22–29)
CREAT SERPL-MCNC: 1.09 MG/DL (ref 0.76–1.27)
DEPRECATED RDW RBC AUTO: 47.1 FL (ref 37–54)
EGFRCR SERPLBLD CKD-EPI 2021: 75.3 ML/MIN/1.73
EOSINOPHIL # BLD AUTO: 0.51 10*3/MM3 (ref 0–0.4)
EOSINOPHIL NFR BLD AUTO: 4.6 % (ref 0.3–6.2)
ERYTHROCYTE [DISTWIDTH] IN BLOOD BY AUTOMATED COUNT: 14.4 % (ref 12.3–15.4)
GLOBULIN UR ELPH-MCNC: 2.5 GM/DL
GLUCOSE SERPL-MCNC: 109 MG/DL (ref 65–99)
HCT VFR BLD AUTO: 42.8 % (ref 37.5–51)
HGB BLD-MCNC: 13.6 G/DL (ref 13–17.7)
IMM GRANULOCYTES # BLD AUTO: 0.05 10*3/MM3 (ref 0–0.05)
IMM GRANULOCYTES NFR BLD AUTO: 0.4 % (ref 0–0.5)
LYMPHOCYTES # BLD AUTO: 1.97 10*3/MM3 (ref 0.7–3.1)
LYMPHOCYTES NFR BLD AUTO: 17.6 % (ref 19.6–45.3)
MCH RBC QN AUTO: 28.4 PG (ref 26.6–33)
MCHC RBC AUTO-ENTMCNC: 31.8 G/DL (ref 31.5–35.7)
MCV RBC AUTO: 89.4 FL (ref 79–97)
MONOCYTES # BLD AUTO: 0.98 10*3/MM3 (ref 0.1–0.9)
MONOCYTES NFR BLD AUTO: 8.8 % (ref 5–12)
NEUTROPHILS NFR BLD AUTO: 67.9 % (ref 42.7–76)
NEUTROPHILS NFR BLD AUTO: 7.58 10*3/MM3 (ref 1.7–7)
NRBC BLD AUTO-RTO: 0 /100 WBC (ref 0–0.2)
PLATELET # BLD AUTO: 289 10*3/MM3 (ref 140–450)
PMV BLD AUTO: 8.9 FL (ref 6–12)
POTASSIUM SERPL-SCNC: 4.1 MMOL/L (ref 3.5–5.2)
PROT SERPL-MCNC: 6.3 G/DL (ref 6–8.5)
RBC # BLD AUTO: 4.79 10*6/MM3 (ref 4.14–5.8)
SODIUM SERPL-SCNC: 138 MMOL/L (ref 136–145)
T4 FREE SERPL-MCNC: 1.16 NG/DL (ref 0.92–1.68)
TSH SERPL DL<=0.05 MIU/L-ACNC: 1.81 UIU/ML (ref 0.27–4.2)
WBC NRBC COR # BLD AUTO: 11.17 10*3/MM3 (ref 3.4–10.8)

## 2025-02-14 PROCEDURE — 85025 COMPLETE CBC W/AUTO DIFF WBC: CPT

## 2025-02-14 PROCEDURE — 96413 CHEMO IV INFUSION 1 HR: CPT

## 2025-02-14 PROCEDURE — 25010000002 PEMBROLIZUMAB 100 MG/4ML SOLUTION 4 ML VIAL: Performed by: INTERNAL MEDICINE

## 2025-02-14 PROCEDURE — 25010000002 HEPARIN LOCK FLUSH PER 10 UNITS: Performed by: INTERNAL MEDICINE

## 2025-02-14 PROCEDURE — 80053 COMPREHEN METABOLIC PANEL: CPT

## 2025-02-14 PROCEDURE — 84439 ASSAY OF FREE THYROXINE: CPT | Performed by: INTERNAL MEDICINE

## 2025-02-14 PROCEDURE — 84443 ASSAY THYROID STIM HORMONE: CPT | Performed by: INTERNAL MEDICINE

## 2025-02-14 RX ORDER — SODIUM CHLORIDE 9 MG/ML
20 INJECTION, SOLUTION INTRAVENOUS ONCE
Status: CANCELLED | OUTPATIENT
Start: 2025-02-14

## 2025-02-14 RX ORDER — SODIUM CHLORIDE 9 MG/ML
20 INJECTION, SOLUTION INTRAVENOUS ONCE
OUTPATIENT
Start: 2025-03-07

## 2025-02-14 RX ORDER — HEPARIN SODIUM (PORCINE) LOCK FLUSH IV SOLN 100 UNIT/ML 100 UNIT/ML
500 SOLUTION INTRAVENOUS AS NEEDED
Status: DISCONTINUED | OUTPATIENT
Start: 2025-02-14 | End: 2025-02-14 | Stop reason: HOSPADM

## 2025-02-14 RX ORDER — SODIUM CHLORIDE 0.9 % (FLUSH) 0.9 %
10 SYRINGE (ML) INJECTION AS NEEDED
Status: DISCONTINUED | OUTPATIENT
Start: 2025-02-14 | End: 2025-02-14 | Stop reason: HOSPADM

## 2025-02-14 RX ORDER — SODIUM CHLORIDE 0.9 % (FLUSH) 0.9 %
10 SYRINGE (ML) INJECTION AS NEEDED
OUTPATIENT
Start: 2025-02-14

## 2025-02-14 RX ORDER — HEPARIN SODIUM (PORCINE) LOCK FLUSH IV SOLN 100 UNIT/ML 100 UNIT/ML
500 SOLUTION INTRAVENOUS AS NEEDED
OUTPATIENT
Start: 2025-02-14

## 2025-02-14 RX ORDER — ZOLEDRONIC ACID 0.04 MG/ML
4 INJECTION, SOLUTION INTRAVENOUS ONCE
OUTPATIENT
Start: 2025-03-07

## 2025-02-14 RX ADMIN — SODIUM CHLORIDE 200 MG: 900 INJECTION, SOLUTION INTRAVENOUS at 13:09

## 2025-02-14 RX ADMIN — Medication 500 UNITS: at 13:36

## 2025-02-14 RX ADMIN — Medication 10 ML: at 13:36

## 2025-02-14 NOTE — PROGRESS NOTES
REASON FOR FOLLOW-UP:     Provide an opinion on any further workup or treatment of metastatic melanoma.                             HISTORY OF PRESENT ILLNESS: Patient is 64 y.o. male presenting today for reevaluation.    History of Present Illness  The patient presents today 2/14/2025 for evaluation of metastatic melanoma undergoing active treatment with Keytruda to discuss the results of PET scan examination obtained last week on 2/7/2025.      He reports a persistent decrease in his heart rate, which has been intermittently in the 40s for several years. He is not experiencing any associated symptoms such as lightheadedness or dizziness. He also notes a concurrent decrease in his blood pressure. His current medication regimen includes metoprolol, administered twice daily, and Norvasc for blood pressure management.  He has a history of atrial fibrillation but has not experienced any episodes since starting his current medication.    He reports experiencing pain in his shoulders and arms, with the right shoulder being more severely affected due to a previous separation injury. He also experiences intermittent pain in his legs and back.    He has been experiencing shortness of breath and difficulty breathing, which he attributes to residual effects from a previous COVID-19 infection. He is currently using Symbicort inhaler, taking 2 puffs in the morning and 2 at night, and is seeking a prescription for a generic version due to insurance coverage issues.    Otherwise patient has been tolerating immunotherapy with pembrolizumab every 3 weeks.      He is due for cycle #9 of treatment today.    Results  Imaging  PET scan shows mixed results with some areas improving and others worsening. In the lungs, one nodule in the right lower lobe has an SUV of 1.8, down from 3.6, and is 1.6 cm in size. A few small lung nodules are present, with one measuring 8 mm. In the liver, activity is slightly higher with an SUV of 5.5, up  "from 5.1. The largest liver lesion measures 9 mm with SUV 4.7 down from previous 12 mm and SUV 5.2.  A separate nodule had SUV 5.5, up from previous SUV 5.1.  A portacaval lymph nodes 1 cm with SUV 5.8 from previous 1 cm with SUV 3.5.  The T6 lytic lesion with SUV 3.6, down from previous SUV 8.7.  Activity in the left sacrum has increased from SUV 9 to SUV 10. Activity in another area in the posterior left ilium has increased from SUV 4.9 to SUV 6.2.  A 1 cm soft tissue nodule between the right fourth and the fifth rib had SUV 9.2, up from 7 mm, and SUV 6.8. Activity in another area near the right 6th rib has increased from SUV 5.4 to SUV 5.9. Activity in the left leg muscles has increased from 3.6 to 5.3.      Past Medical History:   Diagnosis Date    Acute renal failure (ARF) 09/2018    RESOLVED AFTER DEHYDRATION RESOLVED    Anemia     Arthritis     OA AND RA    Asthma     Atrial fibrillation with rapid ventricular response     Bone cancer     BPH (benign prostatic hyperplasia)     Chronic back pain     CKD (chronic kidney disease)     Stage 3    COPD (chronic obstructive pulmonary disease)     Coronary artery calcification seen on CAT scan     Eye cancer, left     TREATMENT WITH RADIATION. SOME VISON LOSS LEFT EYE    Gout     Hepatitis C     IN REMISSION. BEEN LONGER THAN 5 YRS    Hiatal hernia     History of atrial fibrillation     SEPT 2018. WITH SEVERE DEHYDRATION    History of blood transfusion 1978    in [place], no known reaction mild shortness of breath    Hypertension     Liver cancer     Lung cancer     MRSA infection     HX OF    Pneumonia     Psoriasis     Right hip pain     Short of breath on exertion     Sleep apnea     \"MILD\". DID NOT FOLLOW THUR GETTING MACHINE RELATED TO COST OF MACHINE    Syncope 09/2018    secondary to atrial fibrillation with rapid ventricular response. SEVERELY DEHYDRATED AT THIS TIME.    Viral hepatitis     Vision loss of left eye     EYE CANCER    Vitamin D deficiency  "     Past Surgical History:   Procedure Laterality Date    BACK SURGERY      LUMBAR DISC X2. NO HARDWARE    CARPAL TUNNEL RELEASE WITH CUBITAL TUNNEL RELEASE Right     COLONOSCOPY      COLONOSCOPY N/A 07/30/2021    Procedure: COLONOSCOPY;  Surgeon: Flor Maciel MD;  Location: [facility] MAIN OR;  Service: Gastroenterology;  Laterality: N/A;  Diverticulosis    EYE SURGERY Left     Left Eye    HERNIA REPAIR  2016    LUMBAR SPINE SURGERY      x3 in 2000. 2010, 2011    REVISION TOTAL KNEE ARTHROPLASTY Right     TOTAL HIP ARTHROPLASTY Right 05/28/2019    Procedure: RIGHT TOTAL HIP ARTHROPLASTY;  Surgeon: Harish Dominguez MD;  Location: [facility] MAIN OR;  Service: Orthopedics    TOTAL KNEE ARTHROPLASTY Right     TOTAL KNEE ARTHROPLASTY Left 04/25/2024    Procedure: TOTAL KNEE ARTHROPLASTY;  Surgeon: Harish Dominguez MD;  Location: [facility] OR [facility] [place] [facility];  Service: Orthopedics;  Laterality: Left;    VENOUS ACCESS DEVICE (PORT) INSERTION Right 8/23/2024    Procedure: INSERTION VENOUS ACCESS DEVICE;  Surgeon: Sal Wilhelm MD;  Location: [facility] MAIN OR;  Service: General;  Laterality: Right;         ONCOLOGY HISTORY:  The patient is a 63 y.o. year old male  who is here for initial evaluation on 7/25/2024 for evaluation of newly diagnosed metastatic melanoma.  This patient has end-stage arthritis required bilateral knee replacement, history of left eye melanoma, lumbar stenosis status post discectomy, hepatitis C, hypertension, and psoriasis.    His primary doctor found lung issues first.  Patient reports he was congested with a cough and dyspnea for about a month.  Patient went to see his primary care physician Dr. Joshua Blake on 4/17/2024 who prescribed doxycycline and had a chest ray examination which reported multiple pulmonary nodules.  He subsequently had a chest CT on 5/152024 which reported a 1.7 cm nodule opacity in the right lower lobe with additional scattered small bilateral pulmonary nodules.  It also  reported slightly increased size of mildly asymmetric prominent left axillary lymph nodes.  There is also mild asymmetric elevation of the left hemidiaphragm with a new segmental atelectasis and scarring at the left lung base.    Patient has subsequently had a PET scan examination at the [facility], and the reported the right lower lobe 1.6 cm solid nodule with a maximum SUV 3.6.  Multiple additional bilateral subcentimeter solid nodules too small for accurate PET characterization but mainly had uptake greater than background 11.  There was no enlarged hypermetabolic mediastinal or hilar lymph nodes.  In the abdomen, there were 2 dominant hypermetabolic hypodense right hepatic lesions with reference 2.7 cm with SUV 11.1.  There is at least 3 additional smaller hypermetabolic pedicle foci with the left hepatic lobe SUV 3.7.  The lesion in the caudate lobe had a maximum SUV 5.5.  There are multiple pulm lesions, the T6 lesion had SUV 10.8, and the right ilium lesion maximum SUV 10.7, and the right sacrum lesion SUV 9.7, and the left supra-acetabular ilium lesion with SUV 4.6.    The patient was seen by thoracic surgeon Dr. Riggs on 7/11/2024, Dr. Riggs recommended CT-guided core needle biopsy of the liver lesion.  This was done on 7/19/2024.  Pathology evaluation reported metastatic melanoma.     Patient reports today that he was diagnosed with melanoma in his left eye approximately 12 years ago during a routine eye exam. At that time, his eyesight was unimpaired, but a patch was placed in his eye. He spent about a week in a room following the procedure. He lost approximately 40 percent of his vision due to blockage. He received radiation therapy in [facility].     He experienced a minor headache a few weeks ago, which is unusual for him. He denies experiencing nausea, vomiting, or abdominal pain. His appetite is normal. He experienced weight loss of 10 pounds about 1 to 2 months ago, but has since  regained it and is attempting to lose weight again.    He is scheduled for an MRI of the spine on 08/09/2024. His back pain, which he rates as 6 out of 10 at its worst, is located in the lower back at the sacrum area. He also reports weakness in his legs and is not currently taking any medication for the pain. He has consulted with a neurosurgeon and has informed them of his cancer diagnosis.    Laboratory Studies on 7/25/2024  Hemoglobin 13.7 MCV 92.4, platelets 282,000 WBC 9360 neutrophils 4490 lymphocytes 3170, monocytes 1000 eosinophil 540 basophil 100 and immature granulocytes 60.    Biopsy from the liver confirmed metastatic melanoma.    MEDICATIONS    Current Outpatient Medications:     acetaminophen (TYLENOL) 325 MG tablet, Take 2 tablets by mouth Every 6 (Six) Hours As Needed for Mild Pain or Headache., Disp: , Rfl:     albuterol (PROVENTIL HFA;VENTOLIN HFA) 108 (90 BASE) MCG/ACT inhaler, Inhale 2 puffs every 4 (four) hours as needed for wheezing., Disp: 1 inhaler, Rfl: 3    albuterol (PROVENTIL) (2.5 MG/3ML) 0.083% nebulizer solution, Inhale 2.5 mg., Disp: , Rfl:     allopurinol (ZYLOPRIM) 100 MG tablet, Take 1 tablet by mouth 2 (Two) Times a Day., Disp: , Rfl:     amLODIPine (NORVASC) 10 MG tablet, Take 1 tablet by mouth Daily., Disp: , Rfl: 6    apixaban (ELIQUIS) 5 MG tablet tablet, Take 1 tablet by mouth 2 (Two) Times a Day., Disp: 60 tablet, Rfl: 3    cholecalciferol (VITAMIN D3) 25 MCG (1000 UT) tablet, Take 1 tablet by mouth Daily., Disp: , Rfl:     Cyanocobalamin (VITAMIN B-12 PO), Take  by mouth., Disp: , Rfl:     diazePAM (VALIUM) 5 MG tablet, , Disp: , Rfl:     gabapentin (NEURONTIN) 100 MG capsule, Take 1 capsule by mouth 3 (Three) Times a Day., Disp: , Rfl:     HYDROcodone-acetaminophen (NORCO) 5-325 MG per tablet, Take 1 tablet by mouth Every 8 (Eight) Hours As Needed for Moderate Pain., Disp: 60 tablet, Rfl: 0    hydrOXYzine (ATARAX) 25 MG tablet, Take 1 tablet by mouth 3 (Three) Times a Day  As Needed for Itching., Disp: 90 tablet, Rfl: 2    lidocaine-prilocaine (EMLA) 2.5-2.5 % cream, Apply nickel-sized amount over Mediport site 30 min prior to access. Do not rub in., Disp: 30 g, Rfl: 2    methocarbamol (ROBAXIN) 750 MG tablet, Take 1 tablet by mouth 3 (Three) Times a Day., Disp: , Rfl:     metoprolol tartrate (LOPRESSOR) 25 MG tablet, Take 1 tablet by mouth Every 12 (Twelve) Hours., Disp: 180 tablet, Rfl: 3    Omega-3 Fatty Acids (fish oil) 1000 MG capsule capsule, Take 1 capsule by mouth Daily With Breakfast. HOLD PER MD INSTR, Disp: , Rfl:     ondansetron (ZOFRAN) 8 MG tablet, Take 1 tablet by mouth., Disp: , Rfl:     Pembrolizumab (Keytruda) 100 MG/4ML solution, Infuse 8 mL into a venous catheter., Disp: , Rfl:     predniSONE (DELTASONE) 10 MG tablet, Take 1 tablet by mouth Daily., Disp: 90 tablet, Rfl: 0    tamsulosin (FLOMAX) 0.4 MG capsule 24 hr capsule, Take 1 capsule by mouth 2 (Two) Times a Day., Disp: , Rfl:     Trelegy Ellipta 200-62.5-25 MCG/ACT inhaler, Inhale 1 puff Daily., Disp: , Rfl:     Vitamin A 7.5 MG (13461 UT) capsule, 1 Cap, Oral, Cap, Daily, # 30 Cap, 0 Refill(s), Pharmacy: [id number], 182.88 cm, 10/14/24 18:58:00 EDT, CLINICALHEIGHT, 96.36, kg, 10/14/24 18:58:00 EDT, CLINICALWEIGHT, Disp: , Rfl:     vitamin D (ERGOCALCIFEROL) 1.25 MG (77649 UT) capsule capsule, Take 1 capsule by mouth 1 (One) Time Per Week., Disp: , Rfl:     ALLERGIES:   No Known Allergies    SOCIAL HISTORY:       Social History     Socioeconomic History    Marital status: Single   Tobacco Use    Smoking status: Never     Passive exposure: Never    Smokeless tobacco: Never   Vaping Use    Vaping status: Never Used   Substance and Sexual Activity    Alcohol use: No     Comment: no caffeine     Drug use: No    Sexual activity: Defer   He is a  but has never smoked. He used to drink 2 drinks a day, but he has not drunk in over 20 to 25 years.      FAMILY HISTORY:  Family History   Problem  "Relation Age of Onset    Depression Mother     COPD Mother     Mental illness Mother     Diabetes Father     Hypertension Father     Heart disease Father     Hyperlipidemia Father     Cancer Father     Liver cancer Father     Drug abuse Neg Hx     Allergies Neg Hx     Asthma Neg Hx     Stroke Neg Hx     Malig Hyperthermia Neg Hx    His father had liver, bone cancer. He  of the cancer.  No details available.         Vitals:    25 1135   BP: 104/52   Pulse: (!) 46  Comment: repeat manual 44   Temp: 97.5 °F (36.4 °C)   TempSrc: Oral   SpO2: 95%   Weight: 101 kg (221 lb 9.6 oz)   Height: 182.9 cm (72.01\")   PainSc: 5  Comment: widespread   PainLoc: Leg         2025    11:36 AM   Current Status   ECOG score 0       Physical Exam  Constitutional:       General: He is not in acute distress.     Appearance: Normal appearance.   Eyes:      Extraocular Movements: Extraocular movements intact.      Conjunctiva/sclera: Conjunctivae normal.   Cardiovascular:      Rate and Rhythm: Normal rate and regular rhythm.      Heart sounds: Normal heart sounds.   Pulmonary:      Effort: Pulmonary effort is normal.      Breath sounds: Normal breath sounds.   Abdominal:      General: Bowel sounds are normal.      Palpations: Abdomen is soft.      Tenderness: There is no abdominal tenderness.   Musculoskeletal:      Right lower leg: No edema.      Left lower leg: No edema.      Comments: Uses cane for ambulation   Lymphadenopathy:      Cervical: No cervical adenopathy.   Skin:     General: Skin is warm and dry.      Findings: No rash.   Neurological:      Mental Status: He is alert. Mental status is at baseline.   Psychiatric:         Behavior: Behavior normal.         Thought Content: Thought content normal.       RECENT LABS:  Results from last 7 days   Lab Units 25  1036   WBC 10*3/mm3 11.17*   NEUTROS ABS 10*3/mm3 7.58*   HEMOGLOBIN g/dL 13.6   HEMATOCRIT % 42.8   PLATELETS 10*3/mm3 289       Results from last 7 days "   Lab Units 02/14/25  1036   SODIUM mmol/L 138   POTASSIUM mmol/L 4.1   CHLORIDE mmol/L 104   CO2 mmol/L 22.5   BUN mg/dL 24*   CREATININE mg/dL 1.09   CALCIUM mg/dL 9.2   ALBUMIN g/dL 3.8   BILIRUBIN mg/dL 0.4   ALK PHOS U/L 68   ALT (SGPT) U/L 17   AST (SGOT) U/L 22   GLUCOSE mg/dL 109*           IMAGING:  NM PET/CT Whole Body  Narrative: F-18 FDG PET WHOLE BODY WITH PET CT FUSION.     HISTORY: Metastatic melanoma of the left thigh with pathologically  confirmed hepatic metastatic disease, on pembrolizumab. Subsequent  treatment strategy.     TECHNIQUE: Radiation dose reduction techniques were utilized, including  automated exposure control and exposure modulation based on body size.   Blood glucose level at time of injection was 83 mg/dL. 6.5 mCi of F-18  FDG were injected and PET was performed of the whole body. CT was  obtained for localization and attenuation correction. Normalization  method: patient weight. Time at injection 1104. PET start time 101.     Comparison: FDG PET/CT 11/12/2024 and 5/31/2024..     FINDINGS:     Head/neck: No suspicious uptake.     Chest: Misregistered focal mild hypermetabolism along the right  diaphragm with max SUV of 2.9 (series 4/image 275) previously 1.9.     Subcentimeter right lower lobe solid pulmonary nodule abutting the  costal pleura with max SUV of 1.8 (series 4/image 238) previously 1.6 cm  and 3.6 with remeasurement.     Few otherwise unchanged bilateral subcentimeter solid pulmonary nodules  which are at the lower limits of size or too small for accurate PET  characterization. Reference 8 mm right lower lobe and 8 mm left upper  lobe (series 4/images 278 and 242).     Abdomen/pelvis: Few hypodense hypermetabolic hepatic lesions. Reference  lesion with max SUV of 5.5 (series 4/image 312) previously 5.1. Lack of  intravenous contrast limits accurate anatomic measurement. Additional  reference 0.9 cm lesion with max SUV of 4.7 (series 4/image 330)  previously 1.2 cm and  5.2 with remeasurement.     A 1 cm portacaval lymph node with max SUV of 5.8 (series 4/image 338)  previously 1 cm and 3.5.     Bones/soft tissues:      T6 subtle lytic lesion with max SUV of 3.6 (series 4/image 243)  previously 8.7.     Hypermetabolic faintly sclerotic lesion in the left sacrum with max SUV  of 10 (series 4/image 424) previously 9. Hypermetabolic faintly  sclerotic lesion in the posterior left ilium with max SUV of 6.2 (series  4/image 419) previously 4.9.     Hypermetabolic 1 cm soft tissue nodule between the right fourth and  fifth ribs with max SUV of 9.2 (series 4/image 223) previously 0.7 cm  and 6.8 with remeasurement. Focal hypermetabolism just superficial to  the right posterior lateral sixth rib with max SUV of 5.9 (series  4/image 235) previously 5.4.     Focal hypermetabolism in the left vastus intermedius muscle with max SUV  of 5.3 (series 4/image 533) previously 3.6 with remeasurement.     Increased uptake throughout the left shoulder and arm musculature  without focality likely representing muscle activation.              Impression: Dominant right lower lobe solid pulmonary nodule and  hypermetabolic T6 lesion have decreased in size/hypermetabolism and  hypermetabolism respectively. Remainder of the presumed soft tissue  (right chest wall/intramuscular, pulmonary parenchymal, hepatic,  abdominal megan) and osseous metastatic disease is very similar as  detailed above. No FDG PET/CT evidence of new metastatic disease.        This report was finalized on 2/11/2025 6:56 AM by Dr. Betito Morales M.D on Workstation: [id number]       Assessment & Plan   Assessment & Plan      1. Metastatic melanoma in the liver, bilateral lungs and bones.  Patient has a history of left eye melanoma 12 years ago.  Patient had a cough and x-ray subsequently chest CT scan 7/15/2024 showed left lower lobe pulmonary nodule 1.7 cm, and multiple other smaller pulmonary nodules.  PET scan on 7/31/2024  reported multiple hypermetabolic lesions. The patient also has multiple metastatic hypermetabolic bone lesions including the T6 vertebral body and also the bilateral iliac wings and also the left sacrum close to the SI joint.   Patient had a liver biopsy confirmed to be metastatic melanoma.  I discussed with the patient the on 7/25/2024 that I recommended to test his tumor sample by [employer] NGS study to see if the patient will have BRAF mutation so he would be a candidate for oral TKI treatment. Then, we also would look into whether this patient would be a candidate for other targeted therapy as well as immunotherapy.   Arrangements will be made for brain MRI with and without contrast.  MRI on 8/5/2024 reported no evidence for intracranial metastatic disease.  [employer] N[facility] study reported stable MSI and TMB at 2.1 m/MB, negative mutation for BRAF, NRAS and KIT.  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   Brain MRI examination on 8/5/2024 reported no evidence for intracranial metastatic disease.  Discussed with the patient on 8/14/2024, he is not a candidate for targeted therapy using oral TKI due to lack of mutation from BRAF NRAS and KIT.  I discussed with the patient for immunotherapy.  We discussed the single agent pembrolizumab versus doublets using nivolumab plus ipilimumab.  Due to the side effects profile, I recommended using a single agent pembrolizumab every 3 weeks due to its better tolerance.  Patient is agreeable.   Had first cycle immunotherapy pembrolizumab 8/27/2024.  Patient presented for evaluation on 9/17/2024, he reports experiencing some skin pruritus without any visible rashes. He also reports improved energy and appetite from the low-dose prednisone 10 mg daily. He denies any nausea, vomiting, or diarrhea. He has been using his inhaler occasionally for mild shortness of breath, which is not a new symptom. Continue current immunotherapy  with pembrolizumab.   10/8/2024: Proceed with Cycle #3 Keytruda. Tolerating well overall.   10/29/2024: Proceed with Cycle #4 Keytruda. Tolerating well.    After 4 cycles of pembrolizumab, the PET scan revealed a mixed response, predominantly showing significant reduction in metastatic liver lesions and resolution of two small liver metastases. Notable improvement was also observed in some bone areas. However, two new small lesions were detected on the right chest wall. The images were reviewed with the patient and his wife, and it was recommended to continue pembrolizumab for another 4 cycles, followed by PET scan again. If the two areas on the right chest wall continue to grow while other areas respond, local radiation therapy will be considered.   1/24/2025 patient returns for cycle 8 pembrolizumab which is continued every 3 weeks.  He will be due for scans following this cycle and then follow-up with Dr. Ellis.   The PET scan on 2/7/2024 showed mixed response to Keytruda treatment, with some areas showing improvement and others worsening. The disease is currently in a small tumor burden, but there are signs of progression in multiple areas.  I discussed with the patient, recommended to continue immunotherapy with Keytruda.  However at the meantime we will refer patient to Dr. Wallace at the [facility] for potential clinical trials. The patient will be contacted by either this office or the cancer center to set up an appointment.      2.  Metastatic cancer to the bone.   PET scan examination showed multiple bone lesions.    Recommended to start the patient on IV Zometa no more than every 4 weeks.  Patient received first dose of Zometa on 7/31/2024.  Had good tolerance.  10/29/2024 Zometa was given and to be given every 6 weeks.   1/24/2025 patient continue Zometa which is continued every 6 weeks and due today  PET scan on 2/7/2025 showed mixed response with the bone mets,  significant improvement at the T6 lytic lesion, however worsening in the left ilium.    3.  Back pain.  PET scan examination showed multiple bone lesions including T6 vertebral body, and the sacrum and the pelvic bone..    The patient reports low back pain and I looked at the PET scan images. I do not think necessarily the small left SI joint area bone lesion causing the problem. He reports that he was seen by neurosurgeon and is scheduled to have a spine MRI examination on 08/09/2024.  I certainly would like to see the results of that to make final assessment, to see whether this patient would benefit from some radiation therapy or he has lower back pain from other course. He previously had multiple lumbar spine surgeries for pinched nerve/discectomy.  MRI for T/L spine examination at the [facility] 8/9/2024 for new reported no evidence for metastatic disease.  There were extensive multilevel spondylosis in the T-spine and the moderate narrowing of the spinal canal C7-T1 and T4-T5.  The L-spine MRI examination reported marrow replacing lesion in the left sacral gale 1.2 cm, and also the lesion in the left posterior iliac bone measuring 1.7 cm.  There was also evidence of postsurgical changes.   11/19/2024 3 He continues to experience low back pain on the left side. He reported taking three doses of meloxicam, which may have contributed to his renal injury. He was advised against further use of meloxicam, and he expressed understanding.  Today Renzo Streeter reports a pain score of 5.  Given his pain assessment as noted, treatment options were discussed and the following options were decided upon as a follow-up plan to address the patient's pain: continuation of current treatment plan for pain    4. Generalized pain.  9/17/2024 he reports pain in his knees, back, and other areas. He has been taking Motrin for pain relief, approximately four times a day. Continue with Motrin as needed for pain management. If  pain persists or worsens, consider alternative pain management strategies.  10/8/2024: He takes norco about once a week. Pain very well controlled.   Renzo Streeter reports a pain score of 5.  Given his pain assessment as noted, treatment options were discussed and the following options were decided upon as a follow-up plan to address the patient's pain: continuation of current treatment plan for pain.  2/14/2025 he reports pain in his shoulders and arms, with the right shoulder being more affected due to a past separation injury. The left shoulder also has pain but is less severe.      5. Mild leukocytosis.  Laboratory studies on 09/17/2024 show mild leukocytosis with WBC of 11,070. ANC 5660, eosinophils 820, lymphocytes 3440, and monocytes 990. Hemoglobin is normal at 14.2, and platelets are 243,000. CMP, liver function panel, electrolytes, and glucose are all normal. No immediate intervention is required; continue to monitor blood counts.  11/19/2024 lab study showed a slight improvement in leukocytosis, which is currently only marginally elevated at 10,850, with mostly elevated monocytes 1360 and eosinophils 620, but normal neutrophils and lymphocytes.  Ongoing leukocytosis noted today, 12/13/2024 with WBC 12.58.   12/30/2024 WBC 13,500, including ANC 8730.  This is probably related to steroids intra-articular injection.  Patient is afebrile.  No signs for infection. with an increase in eosinophils to 690, which could be due to seasonal allergies or a reaction to medications.   1/24/2025 WBC 11.8, ANC 6.9   Today 2/14/2025 WBC 11.17 and ANC 7.58.      6.  Vascular access.  Patient had a portacatheter placed by Dr. Wilhelm 8/23/2024.    7. Lacerated wrist   10/29/2024: He suffered a skill saw accident at work and lacerated his wrist. He underwent surgery for debridement on 10/18/2024 and has been recovering well. Photo uploaded in chart today as the wound looks dehisced. He is scheduled for post op appt  tomorrow for ongoing management and further recommendations.   11/19/2024 he reports improvement in his right wrist. He will continue to follow up with the hand surgeon and keep the scheduled appointment.  This continues to improve.    No active problem today.    8. Acute renal injury.  11/19/2024 renal function has significantly deteriorated, with a creatinine level of 2.29 and a BUN of 47, likely due to dehydration. He admitted to reduced water intake. Intravenous hydration with 1 L normal saline was recommended daily, and Keytruda was postponed until the coming Friday, with the hope of improved renal function by then. A lab study will be conducted this Friday prior to Keytruda administration. He was advised to increase water intake to maintain hydration at home.  Creatinine is normal today at 1.13  2/14/2025 creatinine 1.09, BUN 24.    9.  New pulmonary emboli   Hospitalization 12/8/2024 through 12/9/2024 secondary to new atrial fibrillation  CT angiogram noted a very small nonocclusive pulmonary.  Patient placed on Eliquis starter pack  Currently on Eliquis 10 mg twice daily.  He understands after completing 1 week of Eliquis he would reduce to 5 mg twice daily.  We reviewed signs and symptoms of bleeding for which to monitor for.   On 1/24/2025 patient continues Eliquis and denies bleeding or bruising.  On 2/14/2025 patient reports tolerating Eliquis anticoagulation.      10. Depression.  On 12/30/2024 he reports feeling depressed occasionally, especially when he stays in bed for extended periods. He is not currently on any medication for depression. He is encouraged to engage in physical activity and exercise to help alleviate symptoms.   2/14/2025 patient reports no significant depression.      11.  Atrial fibrillation  Hospitalization 12/8/2024 through 12/9/2024 secondary to new atrial fibrillation  Evaluation by cardiology with patient to begin metoprolol  Patient is on Eliquis anticoagulation.  He is  currently on metoprolol and Eliquis for atrial fibrillation. He reports no episodes of atrial fibrillation since starting the medication. He will continue his current medication regimen with the adjusted metoprolol dosage.    12. Bradycardia.  Today 2/14/2025 his heart rate is recorded at 46 bpm today, which is below the normal range. He reports that his heart rate has been intermittently in the 40s for years, particularly at night. He is not experiencing any lightheadedness or dizziness. He is advised to reduce his metoprolol dosage to half a pill, taken twice daily, due to its short-acting nature.    13. Dyspnea.  Today 2/14/2025 he reports difficulty breathing and getting out of breath. He attributes this to residual effects from a past COVID-19 infection. A prescription for generic Symbicort has been provided, to be used as 2 puffs in the morning and 2 puffs at night. The prescription will be sent to [facility].      PLAN:   Proceed with cycle #9 palliative pembrolizumab today.  Refer patient to Dr. Wallace at the [facility] for evaluation and possible clinical trial.  Decrease metoprolol by 50%.  Continue Eliquis 5 mg twice a day for anticoagulation.  Continue Norco every 8 hours as needed for pain control.  Continue management of hypertension and followed by primary physician.  Continue IV Zometa every 6 weeks and in line of his pembrolizumab schedule.  I will see patient in 3 weeks for reevaluation with laboratory studies and possible Keytruda, depending on his evaluation at the [facility] by Dr. Wallace.    Patient is on a high risk medication requiring close monitoring for toxicity.    I spent 46 minutes caring for Renzo on this date of service. This time includes time spent by me in the following activities: preparing for the visit, reviewing tests, obtaining and/or reviewing a separately obtained history, performing a medically appropriate examination and/or evaluation, counseling and  educating the patient/family/caregiver, ordering medications, tests, or procedures, referring and communicating with other health care professionals, documenting information in the medical record, independently interpreting results and communicating that information with the patient/family/caregiver and care coordination           Jasmine Ellis MD PhD  02/14/2025        CC:  Eliud Blake MD Jason Chesney, MD, PhD.  [facility] [place], [facility].

## 2025-02-17 ENCOUNTER — PATIENT OUTREACH (OUTPATIENT)
Dept: OTHER | Facility: HOSPITAL | Age: 65
End: 2025-02-17
Payer: MEDICARE

## 2025-02-17 NOTE — PROGRESS NOTES
Reviewed chart. Patient with metastatic melanoma, continues Keytruda every 3 weeks. PET scan 2/7, saw Dr. Ellis 2/14    Called patient. Left message that I was just touching base to see how he was doing with treatment. Wanted to know if he had any questions/concerns or resource/supportive care needs; requested cb

## 2025-02-18 ENCOUNTER — PATIENT OUTREACH (OUTPATIENT)
Dept: OTHER | Facility: HOSPITAL | Age: 65
End: 2025-02-18
Payer: MEDICARE

## 2025-02-18 NOTE — PROGRESS NOTES
Call from patient.  He is asking about his referral to [facility].  He states his treatment is not working any longer and Dr. Ellis referred him. The patient states he has been in a lot of pain and has been resting more; he is afraid that he missed a call from [facility].  I will check with Dr. Ellis's office on the status of that referral.    The patient states he has oral pain medications prescribed by Dr Ellis.  He also sees pain management, who has given him Gabapentin.       Message to Bella Angeles in [facility] office regarding referral

## 2025-02-19 ENCOUNTER — PATIENT OUTREACH (OUTPATIENT)
Dept: OTHER | Facility: HOSPITAL | Age: 65
End: 2025-02-19
Payer: MEDICARE

## 2025-02-19 NOTE — PROGRESS NOTES
Per Bella in Dr. Bone's office, Dr. Wallace's office has the referral. Usually takes about 1 week to process.  This office will call the patient to schedule. The office number is [phone]    Called patient. Notified of above.

## 2025-02-20 ENCOUNTER — PATIENT OUTREACH (OUTPATIENT)
Dept: OTHER | Facility: HOSPITAL | Age: 65
End: 2025-02-20
Payer: MEDICARE

## 2025-02-20 NOTE — PROGRESS NOTES
Call from patient. He is asking about a generic Symbicort prescription; he states Dr. Ellis was going to order it.  I am not seeing it in the chart.  The patient states his PCP usually orders this medication. He will reach out to that provider.    The patient is a 28y Male complaining of

## 2025-02-26 ENCOUNTER — SPECIALTY PHARMACY (OUTPATIENT)
Dept: PHARMACY | Facility: HOSPITAL | Age: 65
End: 2025-02-26
Payer: MEDICARE

## 2025-02-26 NOTE — PROGRESS NOTES
LATE ENTRY FROM 12/13/2024:    The following STAFF message was forwarded to my attention:     Lilia Hunt APRN P [facility] Care Coordinator Pool  Patient was recently hospitalized and diagnosed with new PEs.  He was placed on Eliquis. Can we please investigate the cost of his Eliquis 5 mg twice daily after he completes the starter pack?  I have sent the prescription to pharmacy.  Thank you so much.  Gill     I was not able to obtain the cost of the medication because the pharmacy filled the prescription as soon as it was sent in.     A test claim in [facility] today, shows that the patient last filled this medication on 2/21/25.    Yael Lee - Care Coordinator   2/26/2025  12:43 EST

## 2025-03-03 ENCOUNTER — PATIENT OUTREACH (OUTPATIENT)
Dept: OTHER | Facility: HOSPITAL | Age: 65
End: 2025-03-03
Payer: MEDICARE

## 2025-03-07 ENCOUNTER — INFUSION (OUTPATIENT)
Dept: ONCOLOGY | Facility: HOSPITAL | Age: 65
End: 2025-03-07
Payer: MEDICARE

## 2025-03-07 ENCOUNTER — OFFICE VISIT (OUTPATIENT)
Dept: ONCOLOGY | Facility: CLINIC | Age: 65
End: 2025-03-07
Payer: MEDICARE

## 2025-03-07 VITALS
OXYGEN SATURATION: 96 % | RESPIRATION RATE: 16 BRPM | HEART RATE: 56 BPM | BODY MASS INDEX: 29.7 KG/M2 | SYSTOLIC BLOOD PRESSURE: 159 MMHG | WEIGHT: 219.3 LBS | HEIGHT: 72 IN | TEMPERATURE: 97.5 F | DIASTOLIC BLOOD PRESSURE: 90 MMHG

## 2025-03-07 DIAGNOSIS — Z79.899 ENCOUNTER FOR LONG-TERM (CURRENT) USE OF HIGH-RISK MEDICATION: ICD-10-CM

## 2025-03-07 DIAGNOSIS — C69.92 MALIGNANT NEOPLASM OF LEFT EYE: ICD-10-CM

## 2025-03-07 DIAGNOSIS — C69.40 MELANOMA OF UVEA METASTATIC TO LIVER: Primary | ICD-10-CM

## 2025-03-07 DIAGNOSIS — C78.7 CANCER, METASTATIC TO LIVER: ICD-10-CM

## 2025-03-07 DIAGNOSIS — D64.9 ANEMIA, UNSPECIFIED TYPE: ICD-10-CM

## 2025-03-07 DIAGNOSIS — C69.40 MELANOMA OF UVEA METASTATIC TO LIVER: ICD-10-CM

## 2025-03-07 DIAGNOSIS — C78.7 MELANOMA OF UVEA METASTATIC TO LIVER: ICD-10-CM

## 2025-03-07 DIAGNOSIS — C78.7 MELANOMA OF UVEA METASTATIC TO LIVER: Primary | ICD-10-CM

## 2025-03-07 DIAGNOSIS — G89.3 CANCER RELATED PAIN: ICD-10-CM

## 2025-03-07 DIAGNOSIS — C79.51 SECONDARY CANCER OF BONE: ICD-10-CM

## 2025-03-07 DIAGNOSIS — C69.32 MALIGNANT NEOPLASM OF LEFT CHOROID: ICD-10-CM

## 2025-03-07 DIAGNOSIS — D50.9 IRON DEFICIENCY ANEMIA, UNSPECIFIED IRON DEFICIENCY ANEMIA TYPE: ICD-10-CM

## 2025-03-07 LAB
ALBUMIN SERPL-MCNC: 3.7 G/DL (ref 3.5–5.2)
ALBUMIN/GLOB SERPL: 1.4 G/DL
ALP SERPL-CCNC: 63 U/L (ref 39–117)
ALT SERPL W P-5'-P-CCNC: 17 U/L (ref 1–41)
ANION GAP SERPL CALCULATED.3IONS-SCNC: 10.1 MMOL/L (ref 5–15)
AST SERPL-CCNC: 19 U/L (ref 1–40)
BASOPHILS # BLD AUTO: 0.07 10*3/MM3 (ref 0–0.2)
BASOPHILS NFR BLD AUTO: 0.6 % (ref 0–1.5)
BILIRUB SERPL-MCNC: 0.2 MG/DL (ref 0–1.2)
BUN SERPL-MCNC: 18 MG/DL (ref 8–23)
BUN/CREAT SERPL: 20 (ref 7–25)
CALCIUM SPEC-SCNC: 8.9 MG/DL (ref 8.6–10.5)
CHLORIDE SERPL-SCNC: 107 MMOL/L (ref 98–107)
CO2 SERPL-SCNC: 21.9 MMOL/L (ref 22–29)
CREAT SERPL-MCNC: 0.9 MG/DL (ref 0.76–1.27)
DEPRECATED RDW RBC AUTO: 47.7 FL (ref 37–54)
EGFRCR SERPLBLD CKD-EPI 2021: 94.8 ML/MIN/1.73
EOSINOPHIL # BLD AUTO: 0.4 10*3/MM3 (ref 0–0.4)
EOSINOPHIL NFR BLD AUTO: 3.5 % (ref 0.3–6.2)
ERYTHROCYTE [DISTWIDTH] IN BLOOD BY AUTOMATED COUNT: 14.6 % (ref 12.3–15.4)
FERRITIN SERPL-MCNC: 58.6 NG/ML (ref 30–400)
FOLATE SERPL-MCNC: 10 NG/ML (ref 4.78–24.2)
GLOBULIN UR ELPH-MCNC: 2.7 GM/DL
GLUCOSE SERPL-MCNC: 78 MG/DL (ref 65–99)
HCT VFR BLD AUTO: 42 % (ref 37.5–51)
HGB BLD-MCNC: 13.1 G/DL (ref 13–17.7)
IMM GRANULOCYTES # BLD AUTO: 0.04 10*3/MM3 (ref 0–0.05)
IMM GRANULOCYTES NFR BLD AUTO: 0.4 % (ref 0–0.5)
IRON 24H UR-MRATE: 28 MCG/DL (ref 59–158)
IRON SATN MFR SERPL: 10 % (ref 20–50)
LYMPHOCYTES # BLD AUTO: 3.35 10*3/MM3 (ref 0.7–3.1)
LYMPHOCYTES NFR BLD AUTO: 29.6 % (ref 19.6–45.3)
MAGNESIUM SERPL-MCNC: 2.1 MG/DL (ref 1.6–2.4)
MCH RBC QN AUTO: 27.8 PG (ref 26.6–33)
MCHC RBC AUTO-ENTMCNC: 31.2 G/DL (ref 31.5–35.7)
MCV RBC AUTO: 89 FL (ref 79–97)
MONOCYTES # BLD AUTO: 1.13 10*3/MM3 (ref 0.1–0.9)
MONOCYTES NFR BLD AUTO: 10 % (ref 5–12)
NEUTROPHILS NFR BLD AUTO: 55.9 % (ref 42.7–76)
NEUTROPHILS NFR BLD AUTO: 6.33 10*3/MM3 (ref 1.7–7)
NRBC BLD AUTO-RTO: 0 /100 WBC (ref 0–0.2)
PHOSPHATE SERPL-MCNC: 3.9 MG/DL (ref 2.5–4.5)
PLATELET # BLD AUTO: 312 10*3/MM3 (ref 140–450)
PMV BLD AUTO: 9.1 FL (ref 6–12)
POTASSIUM SERPL-SCNC: 4.1 MMOL/L (ref 3.5–5.2)
PROT SERPL-MCNC: 6.4 G/DL (ref 6–8.5)
RBC # BLD AUTO: 4.72 10*6/MM3 (ref 4.14–5.8)
SODIUM SERPL-SCNC: 139 MMOL/L (ref 136–145)
TIBC SERPL-MCNC: 285 MCG/DL (ref 298–536)
TRANSFERRIN SERPL-MCNC: 191 MG/DL (ref 200–360)
VIT B12 BLD-MCNC: 297 PG/ML (ref 211–946)
WBC NRBC COR # BLD AUTO: 11.32 10*3/MM3 (ref 3.4–10.8)

## 2025-03-07 PROCEDURE — 96375 TX/PRO/DX INJ NEW DRUG ADDON: CPT

## 2025-03-07 PROCEDURE — 82728 ASSAY OF FERRITIN: CPT | Performed by: INTERNAL MEDICINE

## 2025-03-07 PROCEDURE — 82746 ASSAY OF FOLIC ACID SERUM: CPT | Performed by: INTERNAL MEDICINE

## 2025-03-07 PROCEDURE — 82607 VITAMIN B-12: CPT | Performed by: INTERNAL MEDICINE

## 2025-03-07 PROCEDURE — 25010000002 PEMBROLIZUMAB 100 MG/4ML SOLUTION 4 ML VIAL: Performed by: INTERNAL MEDICINE

## 2025-03-07 PROCEDURE — 25010000002 ZOLEDRONIC ACID 4 MG/100ML SOLUTION: Performed by: INTERNAL MEDICINE

## 2025-03-07 PROCEDURE — 83540 ASSAY OF IRON: CPT | Performed by: INTERNAL MEDICINE

## 2025-03-07 PROCEDURE — 36415 COLL VENOUS BLD VENIPUNCTURE: CPT | Performed by: INTERNAL MEDICINE

## 2025-03-07 PROCEDURE — 80053 COMPREHEN METABOLIC PANEL: CPT

## 2025-03-07 PROCEDURE — 84466 ASSAY OF TRANSFERRIN: CPT | Performed by: INTERNAL MEDICINE

## 2025-03-07 PROCEDURE — 96413 CHEMO IV INFUSION 1 HR: CPT

## 2025-03-07 PROCEDURE — 85025 COMPLETE CBC W/AUTO DIFF WBC: CPT

## 2025-03-07 PROCEDURE — 83735 ASSAY OF MAGNESIUM: CPT

## 2025-03-07 PROCEDURE — 84100 ASSAY OF PHOSPHORUS: CPT

## 2025-03-07 RX ORDER — ZOLEDRONIC ACID 0.04 MG/ML
4 INJECTION, SOLUTION INTRAVENOUS ONCE
Status: COMPLETED | OUTPATIENT
Start: 2025-03-07 | End: 2025-03-07

## 2025-03-07 RX ORDER — SODIUM CHLORIDE 9 MG/ML
20 INJECTION, SOLUTION INTRAVENOUS ONCE
Status: CANCELLED | OUTPATIENT
Start: 2025-03-07

## 2025-03-07 RX ORDER — FERROUS SULFATE 325(65) MG
325 TABLET ORAL 2 TIMES DAILY WITH MEALS
Qty: 60 TABLET | Refills: 3 | Status: SHIPPED | OUTPATIENT
Start: 2025-03-07

## 2025-03-07 RX ORDER — HYDROCODONE BITARTRATE AND ACETAMINOPHEN 5; 325 MG/1; MG/1
1 TABLET ORAL EVERY 8 HOURS PRN
Qty: 120 TABLET | Refills: 0 | Status: SHIPPED | OUTPATIENT
Start: 2025-03-07

## 2025-03-07 RX ADMIN — ZOLEDRONIC ACID 4 MG: 0.04 INJECTION, SOLUTION INTRAVENOUS at 09:30

## 2025-03-07 RX ADMIN — SODIUM CHLORIDE 400 MG: 900 INJECTION, SOLUTION INTRAVENOUS at 09:47

## 2025-03-07 NOTE — PROGRESS NOTES
REASON FOR FOLLOW-UP:     Provide an opinion on any further workup or treatment of metastatic melanoma.                             HISTORY OF PRESENT ILLNESS: Patient is 64 y.o. male presenting today for reevaluation.    History of Present Illness      The patient presented today on 03/07/2025 for a 3-week follow-up evaluation.  He is due for cycle 10 today.      When he was seen last time 3 weeks ago on 02/14/2025, a referral to Dr. Wallace at the [facility] was recommended for evaluation and potential clinical trial participation due to the mixed response of his metastatic melanoma to immunotherapy with pembrolizumab.     He reports an episode of severe pain approximately 3 weeks ago, which was generalized but predominantly affected his arms, shoulders, and neck, rendering him unable to use his hands. He has not attempted any over-the-counter analgesics due to concerns about potential renal or hepatic complications. He expresses a need for more potent analgesics than his current regimen of hydrocodone 5 mg, which he takes every 8 hours, occasionally doubling the dose when necessary. He continues his anticoagulant therapy with Eliquis and reports no bleeding episodes. He is also on a daily dose of prednisone 10 mg, which he has attempted to taper off but experienced increased pain as a result. He reports no gastrointestinal symptoms such as diarrhea or constipation.    He has a known diagnosis of psoriasis, for which he was advised to use Selsun Blue, but reports no significant improvement. He has an upcoming appointment with his rheumatologist in a few weeks and anticipates a potential change in his psoriasis management plan. He notes that his psoriasis has worsened since starting immunotherapy. He describes the condition as manageable, with occasional flaking but no severe itching.    He expresses concern about his hemoglobin levels, which have been consistently low for several years, and wonders if  "there are any interventions to elevate them. He believes that addressing this issue may alleviate his fatigue.    MEDICATIONS  Current: Pembrolizumab, hydrocodone, Eliquis, prednisone  Results    Laboratory Studies 3/7/2025  Mild persistence of leukocytosis, WBC 11,320, normal neutrophils 6310, lymphocytes 3350, and mildly elevated monocytes 1130. Hemoglobin is 13.1, slightly trending down and normal platelets 312,000. Normal liver function panel, normal renal function, creatinine 0.9, normal electrolytes and  glucose 78.      Past Medical History:   Diagnosis Date    Acute renal failure (ARF) 09/2018    RESOLVED AFTER DEHYDRATION RESOLVED    Anemia     Arthritis     OA AND RA    Asthma     Atrial fibrillation with rapid ventricular response     Bone cancer     BPH (benign prostatic hyperplasia)     Chronic back pain     CKD (chronic kidney disease)     Stage 3    COPD (chronic obstructive pulmonary disease)     Coronary artery calcification seen on CAT scan     Eye cancer, left     TREATMENT WITH RADIATION. SOME VISON LOSS LEFT EYE    Gout     Hepatitis C     IN REMISSION. BEEN LONGER THAN 5 YRS    Hiatal hernia     History of atrial fibrillation     SEPT 2018. WITH SEVERE DEHYDRATION    History of blood transfusion 1978    in [place], no known reaction mild shortness of breath    Hypertension     Liver cancer     Lung cancer     MRSA infection     HX OF    Pneumonia     Psoriasis     Right hip pain     Short of breath on exertion     Sleep apnea     \"MILD\". DID NOT FOLLOW THUR GETTING MACHINE RELATED TO COST OF MACHINE    Syncope 09/2018    secondary to atrial fibrillation with rapid ventricular response. SEVERELY DEHYDRATED AT THIS TIME.    Viral hepatitis     Vision loss of left eye     EYE CANCER    Vitamin D deficiency      Past Surgical History:   Procedure Laterality Date    BACK SURGERY      LUMBAR DISC X2. NO HARDWARE    CARPAL TUNNEL RELEASE WITH CUBITAL TUNNEL RELEASE Right     COLONOSCOPY      " COLONOSCOPY N/A 07/30/2021    Procedure: COLONOSCOPY;  Surgeon: Flor Maciel MD;  [place] [facility] [place]  Service: Gastroenterology;  Laterality: N/A;  Diverticulosis    EYE SURGERY Left     Left Eye    HERNIA REPAIR  2016    LUMBAR SPINE SURGERY      x3 in 2000. 2010, 2011    REVISION TOTAL KNEE ARTHROPLASTY Right     TOTAL HIP ARTHROPLASTY Right 05/28/2019    Procedure: RIGHT TOTAL HIP ARTHROPLASTY;  Surgeon: Harish Dominguez MD;  Location: [facility] MAIN OR;  Service: Orthopedics    TOTAL KNEE ARTHROPLASTY Right     TOTAL KNEE ARTHROPLASTY Left 04/25/2024    Procedure: TOTAL KNEE ARTHROPLASTY;  Surgeon: Harish Dominguez MD;  Location:  [facility];  Service: Orthopedics;  Laterality: Left;    VENOUS ACCESS DEVICE (PORT) INSERTION Right 8/23/2024    Procedure: INSERTION VENOUS ACCESS DEVICE;  Surgeon: Sal Wilhelm MD;  Location: [facility] MAIN OR;  Service: General;  Laterality: Right;         ONCOLOGY HISTORY:  The patient is a 63 y.o. year old male who is here for initial evaluation on 7/25/2024 for evaluation of newly diagnosed metastatic melanoma.  This patient has end-stage arthritis required bilateral knee replacement, history of left eye melanoma, lumbar stenosis status post discectomy, hepatitis C, hypertension, and psoriasis.    His primary doctor found lung issues first.  Patient reports he was congested with a cough and dyspnea for about a month.  Patient went to see his primary care physician Dr. Joshua Blake on 4/17/2024 who prescribed doxycycline and had a chest ray examination which reported multiple pulmonary nodules.  He subsequently had a chest CT on 5/152024 which reported a 1.7 cm nodule opacity in the right lower lobe with additional scattered small bilateral pulmonary nodules.  It also reported slightly increased size of mildly asymmetric prominent left axillary lymph nodes.  There is also mild asymmetric elevation of the left hemidiaphragm with a new segmental atelectasis  and scarring at the left lung base.    Patient has subsequently had a PET scan examination at the [facility], and the reported the right lower lobe 1.6 cm solid nodule with a maximum SUV 3.6.  Multiple additional bilateral subcentimeter solid nodules too small for accurate PET characterization but mainly had uptake greater than background 11.  There was no enlarged hypermetabolic mediastinal or hilar lymph nodes.  In the abdomen, there were 2 dominant hypermetabolic hypodense right hepatic lesions with reference 2.7 cm with SUV 11.1.  There is at least 3 additional smaller hypermetabolic pedicle foci with the left hepatic lobe SUV 3.7.  The lesion in the caudate lobe had a maximum SUV 5.5.  There are multiple pulm lesions, the T6 lesion had SUV 10.8, and the right ilium lesion maximum SUV 10.7, and the right sacrum lesion SUV 9.7, and the left supra-acetabular ilium lesion with SUV 4.6.    The patient was seen by thoracic surgeon Dr. Riggs on 7/11/2024, Dr. Riggs recommended CT-guided core needle biopsy of the liver lesion.  This was done on 7/19/2024.  Pathology evaluation reported metastatic melanoma.     Patient reports today that he was diagnosed with melanoma in his left eye approximately 12 years ago during a routine eye exam. At that time, his eyesight was unimpaired, but a patch was placed in his eye. He spent about a week in a room following the procedure. He lost approximately 40 percent of his vision due to blockage. He received radiation therapy in [facility].     He experienced a minor headache a few weeks ago, which is unusual for him. He denies experiencing nausea, vomiting, or abdominal pain. His appetite is normal. He experienced weight loss of 10 pounds about 1 to 2 months ago, but has since regained it and is attempting to lose weight again.    He is scheduled for an MRI of the spine on 08/09/2024. His back pain, which he rates as 6 out of 10 at its worst, is located in the lower  back at the sacrum area. He also reports weakness in his legs and is not currently taking any medication for the pain. He has consulted with a neurosurgeon and has informed them of his cancer diagnosis.    Laboratory Studies on 7/25/2024  Hemoglobin 13.7 MCV 92.4, platelets 282,000 WBC 9360 neutrophils 4490 lymphocytes 3170, monocytes 1000 eosinophil 540 basophil 100 and immature granulocytes 60.    Biopsy from the liver confirmed metastatic melanoma.      PET scan shows mixed results with some areas improving and others worsening. In the lungs, one nodule in the right lower lobe has an SUV of 1.8, down from 3.6, and is 1.6 cm in size. A few small lung nodules are present, with one measuring 8 mm. In the liver, activity is slightly higher with an SUV of 5.5, up from 5.1. The largest liver lesion measures 9 mm with SUV 4.7 down from previous 12 mm and SUV 5.2.  A separate nodule had SUV 5.5, up from previous SUV 5.1.  A portacaval lymph nodes 1 cm with SUV 5.8 from previous 1 cm with SUV 3.5.  The T6 lytic lesion with SUV 3.6, down from previous SUV 8.7.  Activity in the left sacrum has increased from SUV 9 to SUV 10. Activity in another area in the posterior left ilium has increased from SUV 4.9 to SUV 6.2.  A 1 cm soft tissue nodule between the right fourth and the fifth rib had SUV 9.2, up from 7 mm, and SUV 6.8. Activity in another area near the right 6th rib has increased from SUV 5.4 to SUV 5.9. Activity in the left leg muscles has increased from 3.6 to 5.3.    MEDICATIONS    Current Outpatient Medications:     acetaminophen (TYLENOL) 325 MG tablet, Take 2 tablets by mouth Every 6 (Six) Hours As Needed for Mild Pain or Headache., Disp: , Rfl:     albuterol (PROVENTIL HFA;VENTOLIN HFA) 108 (90 BASE) MCG/ACT inhaler, Inhale 2 puffs every 4 (four) hours as needed for wheezing., Disp: 1 inhaler, Rfl: 3    albuterol (PROVENTIL) (2.5 MG/3ML) 0.083% nebulizer solution, Inhale 2.5 mg., Disp: , Rfl:     allopurinol  (ZYLOPRIM) 100 MG tablet, Take 1 tablet by mouth 2 (Two) Times a Day., Disp: , Rfl:     amLODIPine (NORVASC) 10 MG tablet, Take 1 tablet by mouth Daily., Disp: , Rfl: 6    apixaban (ELIQUIS) 5 MG tablet tablet, Take 1 tablet by mouth 2 (Two) Times a Day., Disp: 60 tablet, Rfl: 3    cholecalciferol (VITAMIN D3) 25 MCG (1000 UT) tablet, Take 1 tablet by mouth Daily., Disp: , Rfl:     Cyanocobalamin (VITAMIN B-12 PO), Take  by mouth., Disp: , Rfl:     diazePAM (VALIUM) 5 MG tablet, , Disp: , Rfl:     gabapentin (NEURONTIN) 100 MG capsule, Take 1 capsule by mouth 3 (Three) Times a Day., Disp: , Rfl:     HYDROcodone-acetaminophen (NORCO) 5-325 MG per tablet, Take 1 tablet by mouth Every 8 (Eight) Hours As Needed for Moderate Pain., Disp: 60 tablet, Rfl: 0    hydrOXYzine (ATARAX) 25 MG tablet, Take 1 tablet by mouth 3 (Three) Times a Day As Needed for Itching., Disp: 90 tablet, Rfl: 2    lidocaine-prilocaine (EMLA) 2.5-2.5 % cream, Apply nickel-sized amount over Mediport site 30 min prior to access. Do not rub in., Disp: 30 g, Rfl: 2    methocarbamol (ROBAXIN) 750 MG tablet, Take 1 tablet by mouth 3 (Three) Times a Day., Disp: , Rfl:     metoprolol tartrate (LOPRESSOR) 25 MG tablet, Take 1 tablet by mouth Every 12 (Twelve) Hours., Disp: 180 tablet, Rfl: 3    Omega-3 Fatty Acids (fish oil) 1000 MG capsule capsule, Take 1 capsule by mouth Daily With Breakfast. HOLD PER MD INSTR, Disp: , Rfl:     ondansetron (ZOFRAN) 8 MG tablet, Take 1 tablet by mouth., Disp: , Rfl:     Pembrolizumab (Keytruda) 100 MG/4ML solution, Infuse 8 mL into a venous catheter., Disp: , Rfl:     predniSONE (DELTASONE) 10 MG tablet, Take 1 tablet by mouth Daily., Disp: 90 tablet, Rfl: 0    tamsulosin (FLOMAX) 0.4 MG capsule 24 hr capsule, Take 1 capsule by mouth 2 (Two) Times a Day., Disp: , Rfl:     Trelegy Ellipta 200-62.5-25 MCG/ACT inhaler, Inhale 1 puff Daily., Disp: , Rfl:     Vitamin A 7.5 MG (32100 UT) capsule, 1 Cap, Oral, Cap, Daily, # 30  "Cap, 0 Refill(s), [id number], 182.88 cm, 10/14/24 18:58:00 EDT, CLINICALHEIGHT, 96.36, kg, 10/14/24 18:58:00 EDT, CLINICALWEIGHT, Disp: , Rfl:     vitamin D (ERGOCALCIFEROL) 1.25 MG (59481 UT) capsule capsule, Take 1 capsule by mouth 1 (One) Time Per Week., Disp: , Rfl:     ALLERGIES:   No Known Allergies    SOCIAL HISTORY:       Social History     Socioeconomic History    Marital status: Single   Tobacco Use    Smoking status: Never     Passive exposure: Never    Smokeless tobacco: Never   Vaping Use    Vaping status: Never Used   Substance and Sexual Activity    Alcohol use: No     Comment: no caffeine     Drug use: No    Sexual activity: Defer   He is a  but has never smoked. He used to drink 2 drinks a day, but he has not drunk in over 20 to 25 years.      FAMILY HISTORY:  Family History   Problem Relation Age of Onset    Depression Mother     COPD Mother     Mental illness Mother     Diabetes Father     Hypertension Father     Heart disease Father     Hyperlipidemia Father     Cancer Father     Liver cancer Father     Drug abuse Neg Hx     Allergies Neg Hx     Asthma Neg Hx     Stroke Neg Hx     Malig Hyperthermia Neg Hx    His father had liver, bone cancer. He  of the cancer.  No details available.         Vitals:    25 0842   BP: 159/90   Pulse: 56   Resp: 16   Temp: 97.5 °F (36.4 °C)   TempSrc: Infrared   SpO2: 96%   Weight: 99.5 kg (219 lb 4.8 oz)   Height: 182.9 cm (72.01\")   PainSc: 8    PainLoc: Arm  Comment: neck, shoulders, and arms         3/7/2025     8:46 AM   Current Status   ECOG score 0       Physical Exam  Constitutional:       General: He is not in acute distress.     Appearance: Normal appearance.   Eyes:      Conjunctiva/sclera: Conjunctivae normal.   Cardiovascular:      Rate and Rhythm: Normal rate and regular rhythm.      Heart sounds: Normal heart sounds.   Pulmonary:      Effort: Pulmonary effort is normal.      Breath sounds: Normal breath " "sounds.   Abdominal:      General: Bowel sounds are normal.      Palpations: Abdomen is soft.      Tenderness: There is no abdominal tenderness.   Musculoskeletal:      Right lower leg: No edema.      Left lower leg: No edema.      Comments: Uses cane for ambulation   Lymphadenopathy:      Cervical: No cervical adenopathy.   Skin:     General: Skin is warm.      Findings: No rash.   Neurological:      Mental Status: He is alert. Mental status is at baseline.   Psychiatric:         Thought Content: Thought content normal.       RECENT LABS:  Results from last 7 days   Lab Units 03/07/25  0805   WBC 10*3/mm3 11.32*   NEUTROS ABS 10*3/mm3 6.33   HEMOGLOBIN g/dL 13.1   HEMATOCRIT % 42.0   PLATELETS 10*3/mm3 312       Results from last 7 days   Lab Units 03/07/25  0805   SODIUM mmol/L 139   POTASSIUM mmol/L 4.1   CHLORIDE mmol/L 107   CO2 mmol/L 21.9*   BUN mg/dL 18   CREATININE mg/dL 0.90   CALCIUM mg/dL 8.9   ALBUMIN g/dL 3.7   BILIRUBIN mg/dL 0.2   ALK PHOS U/L 63   ALT (SGPT) U/L 17   AST (SGOT) U/L 19   GLUCOSE mg/dL 78   MAGNESIUM mg/dL 2.1   No results found for: \"IRON\", \"LABIRON\", \"TRANSFERRIN\", \"TIBC\", \"FERRITIN\"  Lab Results   Component Value Date    FOLATE 12.98 03/11/2016     Lab Results   Component Value Date    HOYDNTGT81 466 03/11/2016             IMAGING:  NM PET/CT Whole Body (02/07/2025 12:30)       Assessment & Plan   Assessment & Plan      1. Metastatic melanoma in the liver, bilateral lungs and bones.  Patient has a history of left eye melanoma 12 years ago.  Patient had a cough and x-ray subsequently chest CT scan 7/15/2024 showed left lower lobe pulmonary nodule 1.7 cm, and multiple other smaller pulmonary nodules.  PET scan on 7/31/2024 reported multiple hypermetabolic lesions. The patient also has multiple metastatic hypermetabolic bone lesions including the T6 vertebral body and also the bilateral iliac wings and also the left sacrum close to the SI joint.   Patient had a liver biopsy confirmed " to be metastatic melanoma.  I discussed with the patient the on 7/25/2024 that I recommended to test his tumor sample by [employer] NGS study to see if the patient will have BRAF mutation so he would be a candidate for oral TKI treatment. Then, we also would look into whether this patient would be a candidate for other targeted therapy as well as immunotherapy.   Arrangements will be made for brain MRI with and without contrast.  MRI on 8/5/2024 reported no evidence for intracranial metastatic disease.  [employer] NGS study reported stable MSI and TMB at 2.1 m/MB, negative mutation for BRAF, NRAS and KIT.  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   Brain MRI examination on 8/5/2024 reported no evidence for intracranial metastatic disease.  Discussed with the patient on 8/14/2024, he is not a candidate for targeted therapy using oral TKI due to lack of mutation from BRAF NRAS and KIT.  I discussed with the patient for immunotherapy.  We discussed the single agent pembrolizumab versus doublets using nivolumab plus ipilimumab.  Due to the side effects profile, I recommended using a single agent pembrolizumab every 3 weeks due to its better tolerance.  Patient is agreeable.   Had first cycle immunotherapy pembrolizumab 8/27/2024.  Patient presented for evaluation on 9/17/2024, he reports experiencing some skin pruritus without any visible rashes. He also reports improved energy and appetite from the low-dose prednisone 10 mg daily. He denies any nausea, vomiting, or diarrhea. He has been using his inhaler occasionally for mild shortness of breath, which is not a new symptom. Continue current immunotherapy with pembrolizumab.   10/8/2024: Proceed with Cycle #3 Keytruda. Tolerating well overall.   10/29/2024: Proceed with Cycle #4 Keytruda. Tolerating well.    After 4 cycles of pembrolizumab, the PET scan revealed a mixed response, predominantly showing significant  reduction in metastatic liver lesions and resolution of two small liver metastases. Notable improvement was also observed in some bone areas. However, two new small lesions were detected on the right chest wall. The images were reviewed with the patient and his wife, and it was recommended to continue pembrolizumab for another 4 cycles, followed by PET scan again. If the two areas on the right chest wall continue to grow while other areas respond, local radiation therapy will be considered.   1/24/2025 patient returns for cycle 8 pembrolizumab which is continued every 3 weeks.  He will be due for scans following this cycle and then follow-up with Dr. Ellis.   The PET scan on 2/7/2024 showed mixed response to Keytruda treatment, with some areas showing improvement and others worsening. The disease is currently in a small tumor burden, but there are signs of progression in multiple areas.  I discussed with the patient, recommended to continue immunotherapy with Keytruda.  However at the meantime we will refer patient to Dr. Wallace at the [facility] for potential clinical trials.    Patient had cycle 9 pembrolizumab on 2/14/2024.   He was seen by Dr. Wallace at the [facility] on 03/05/2025, who recommended continuing pembrolizumab and repeating a scan in 3 months for evaluation.   3/7/2025 he is due for cycle 10 pembrolizumab today.  A CT scan will be scheduled in 3 months for further evaluation. If the disease remains stable, he will continue pembrolizumab; otherwise, he will consult Dr. Almonte again if the disease progresses.    2.  Metastatic cancer to the bone.   PET scan examination showed multiple bone lesions.    Recommended to start the patient on IV Zometa no more than every 4 weeks.  Patient received first dose of Zometa on 7/31/2024.  Had good tolerance.  10/29/2024 Zometa was given and to be given every 6 weeks.   1/24/2025 patient continue Zometa which is  continued every 6 weeks and due today  PET scan on 2/7/2025 showed mixed response with the bone mets, significant improvement at the T6 lytic lesion, however worsening in the left ilium.   3/7/2025 Zometa will be given IV, and repeat every 6 weeks.    3.  Cancer related pain.  PET scan examination showed multiple bone lesions including T6 vertebral body, and the sacrum and the pelvic bone..    The patient reports low back pain and I looked at the PET scan images. I do not think necessarily the small left SI joint area bone lesion causing the problem. He reports that he was seen by neurosurgeon and is scheduled to have a spine MRI examination on 08/09/2024.  I certainly would like to see the results of that to make final assessment, to see whether this patient would benefit from some radiation therapy or he has lower back pain from other course. He previously had multiple lumbar spine surgeries for pinched nerve/discectomy.  MRI for T/L spine examination at the [facility] 8/9/2024 for new reported no evidence for metastatic disease.  There were extensive multilevel spondylosis in the T-spine and the moderate narrowing of the spinal canal C7-T1 and T4-T5.  The L-spine MRI examination reported marrow replacing lesion in the left sacral gale 1.2 cm, and also the lesion in the left posterior iliac bone measuring 1.7 cm.  There was also evidence of postsurgical changes.   11/19/2024 3 He continues to experience low back pain on the left side. He reported taking three doses of meloxicam, which may have contributed to his renal injury. He was advised against further use of meloxicam, and he expressed understanding.  Today Renzo Streeter reports a pain score of 8.  Given his pain assessment as noted, treatment options were discussed and the following options were decided upon as a follow-up plan to address the patient's pain: continuation of current treatment plan for pain.     4. Generalized pain.  9/17/2024 he  reports pain in his knees, back, and other areas. He has been taking Motrin for pain relief, approximately four times a day. Continue with Motrin as needed for pain management. If pain persists or worsens, consider alternative pain management strategies.  10/8/2024: He takes norco about once a week. Pain very well controlled.   2/14/2025 he reports pain in his shoulders and arms, with the right shoulder being more affected due to a past separation injury. The left shoulder also has pain but is less severe.   Renzo Streeter reports a pain score of 8.  Given his pain assessment as noted, treatment options were discussed and the following options were decided upon as a follow-up plan to address the patient's pain: 3/7/2025 he reports severe pain in his hands, arms, shoulders, and neck, which has been managed with hydrocodone 5 mg every 8 hours. He is advised to take hydrocodone up to every 4 hours if needed. A prescription for 120 tablets of hydrocodone 5 mg will be sent to his pharmacy. He is advised to monitor for constipation while on pain medication.      5. Mild leukocytosis.  Laboratory studies on 09/17/2024 show mild leukocytosis with WBC of 11,070. ANC 5660, eosinophils 820, lymphocytes 3440, and monocytes 990. Hemoglobin is normal at 14.2, and platelets are 243,000. CMP, liver function panel, electrolytes, and glucose are all normal. No immediate intervention is required; continue to monitor blood counts.  11/19/2024 lab study showed a slight improvement in leukocytosis, which is currently only marginally elevated at 10,850, with mostly elevated monocytes 1360 and eosinophils 620, but normal neutrophils and lymphocytes.  Ongoing leukocytosis noted today, 12/13/2024 with WBC 12.58.   12/30/2024 WBC 13,500, including ANC 8730.  This is probably related to steroids intra-articular injection.  Patient is afebrile.  No signs for infection. with an increase in eosinophils to 690, which could be due to seasonal  allergies or a reaction to medications.   1/24/2025 WBC 11.8, ANC 6.9   2/14/2025 WBC 11.17 and ANC 7.58.  3/7/2025 laboratory studies show mild persistence of leukocytosis (WBC 11,320) with normal neutrophils (6330), lymphocytes (3350), and mildly elevated monocytes (1130).     6.  Vascular access.  Patient had a portacatheter placed by Dr. Wilhelm 8/23/2024.    7. Acute renal injury.  11/19/2024 renal function has significantly deteriorated, with a creatinine level of 2.29 and a BUN of 47, likely due to dehydration. He admitted to reduced water intake. Intravenous hydration with 1 L normal saline was recommended daily, and Keytruda was postponed until the coming Friday, with the hope of improved renal function by then. A lab study will be conducted this Friday prior to Keytruda administration. He was advised to increase water intake to maintain hydration at home.  Creatinine is normal today at 1.13  2/14/2025 creatinine 1.09, BUN 24.  3/7/2025 improved creatinine 0.9 and BUN 18.    8.  New pulmonary emboli   Hospitalization 12/8/2024 through 12/9/2024 secondary to new atrial fibrillation  CT angiogram noted a very small nonocclusive pulmonary.  Patient placed on Eliquis starter pack  Currently on Eliquis 10 mg twice daily.  He understands after completing 1 week of Eliquis he would reduce to 5 mg twice daily.  We reviewed signs and symptoms of bleeding for which to monitor for.   On 1/24/2025 patient continues Eliquis and denies bleeding or bruising.  On 3/7/2025 patient reports tolerating Eliquis anticoagulation.      9. Depression.  On 12/30/2024 he reports feeling depressed occasionally, especially when he stays in bed for extended periods. He is not currently on any medication for depression. He is encouraged to engage in physical activity and exercise to help alleviate symptoms.   2/14/2025 patient reports no significant depression.   Patient is doing well.      10.  Atrial fibrillation  Hospitalization  12/8/2024 through 12/9/2024 secondary to new atrial fibrillation  Evaluation by cardiology with patient to begin metoprolol  Patient is on Eliquis anticoagulation.  He is currently on metoprolol and Eliquis for atrial fibrillation. He reports no episodes of atrial fibrillation since starting the medication. He will continue his current medication regimen with the adjusted metoprolol dosage.    11. Bradycardia.  On 2/14/2025 his heart rate is recorded at 46 bpm today, which is below the normal range. He reports that his heart rate has been intermittently in the 40s for years, particularly at night. He is not experiencing any lightheadedness or dizziness. He is advised to reduce his metoprolol dosage to half a pill, taken twice daily, due to its short-acting nature.  3/7/2025 heart rate 56.    12. Dyspnea.  2/14/2025 he reports difficulty breathing and getting out of breath. He attributes this to residual effects from a past COVID-19 infection. A prescription for generic Symbicort has been provided, to be used as 2 puffs in the morning and 2 puffs at night. The prescription will be sent to [facility].  3/7/2025 patient reports no difficulty with breathing.    13. Anemia.   3/7/2025 his hemoglobin is slightly trending down (13.1). Iron, B12, and folate levels will be checked to identify any deficiencies that might be contributing to his anemia and fatigue.    14. Psoriasis.  He reports that his psoriasis has worsened, likely due to immunotherapy. He has been using Selsun Blue without significant improvement. He will discuss alternative treatments with his rheumatologist in a couple of weeks.        PLAN:   Proceed with cycle #10 palliative pembrolizumab today.  Will change Keytruda to every 6 weeks with double doses.  IV Zometa today and repeat every 6 weeks in line with his pembrolizumab schedule.  Check lab study today for ferritin iron profile, B12 and folate level.  Increase Norco to every 4 hours as needed.  I E  scribed 120 tablets to his pharmacy today.  Continue metoprolol by 50% dose reduction.  Continue Eliquis 5 mg twice a day for anticoagulation.  Continue management of hypertension and followed by primary physician.  Patient will see APRN in 6 weeks for reevaluation with laboratory studies prior to next pembrolizumab treatment.  Obtain PET scan examination in 11 weeks for reassessment.  I will see patient in 12 weeks for reevaluation with laboratory studies and PET scan and possible pembrolizumab.    I spent 56 minutes caring for Renzo on this date of service. This time includes time spent by me in the following activities: preparing for the visit, reviewing tests, obtaining and/or reviewing a separately obtained history, performing a medically appropriate examination and/or evaluation, counseling and educating the patient/family/caregiver, ordering medications, tests, or procedures, referring and communicating with other health care professionals, documenting information in the medical record, independently interpreting results and communicating that information with the patient/family/caregiver and care coordination       Patient is on a high risk medication requiring close monitoring for toxicity.      Addendum:  Lab Results   Component Value Date    IRON 28 (L) 03/07/2025    LABIRON 10 (L) 03/07/2025    TRANSFERRIN 191 (L) 03/07/2025    TIBC 285 (L) 03/07/2025    FERRITIN 58.60 03/07/2025     Iron study showed evidence of iron deficiency.  Discussed with patient, will start the patient on oral iron ferrous sulfate 325 mg twice a day.  I E scribed to his pharmacy.         Jasmine Ellis MD PhD  03/07/2025        CC:  Eliud Blake MD Jason Chesney, MD, PhD.  [facility], [facility].

## 2025-03-10 ENCOUNTER — TELEPHONE (OUTPATIENT)
Dept: ONCOLOGY | Facility: CLINIC | Age: 65
End: 2025-03-10
Payer: MEDICARE

## 2025-03-10 NOTE — TELEPHONE ENCOUNTER
Patient called and explained as of 3/7/25 patient will receive Keytruda 400 mg every 6 weeks instead of 200 mg every 3 weeks.    Patient v/u

## 2025-03-10 NOTE — TELEPHONE ENCOUNTER
Provider: Caleb  Caller: patient  Relationship to Patient: self  Call Back Phone Number: [phone]  Reason for Call: patient is calling with questions about his treatment on Friday

## 2025-03-18 ENCOUNTER — PATIENT OUTREACH (OUTPATIENT)
Dept: OTHER | Facility: HOSPITAL | Age: 65
End: 2025-03-18
Payer: MEDICARE

## 2025-03-18 NOTE — PROGRESS NOTES
Reviewed chart. Patient with metastatic melanoma, transitioned to Keytruda 400 mg every 6 weeks as of 3/7, due again 4/48. PET scheduled 5/23.    Called patient. He states he is still congested from his recent bout of COVID.  He sees his PCP next week. He denies fever or shortness of breath. Encouraged him to contact his PCP if his symptoms worsen.     We discussed his infusions every 6 weeks.  We discussed his recent consult with [facility]. They recommended continued Keytruda. We discussed his PET scan in May and f/u appt with Dr. Ellis 5/30.    The patient denies any questions/concerns or ongoing resource needs. Navigation will continue to follow; encouraged patient to call as needed.

## 2025-03-20 ENCOUNTER — TELEPHONE (OUTPATIENT)
Dept: ONCOLOGY | Facility: CLINIC | Age: 65
End: 2025-03-20
Payer: MEDICARE

## 2025-03-20 NOTE — TELEPHONE ENCOUNTER
Per request from Dr Ellis patient called and informed B-12 is low and patient needs to take B-12 1000 mgc daily    Patient v/u

## 2025-03-20 NOTE — TELEPHONE ENCOUNTER
----- Message from Jasmine Ellis sent at 3/20/2025 11:18 AM EDT -----  Regarding: b12  Jayda good morning,Would you ask patient to also take vitamin B12 at 1000 mcg daily?Thank you very much!Caleb

## 2025-04-16 RX ORDER — PREDNISONE 10 MG/1
10 TABLET ORAL DAILY
Qty: 90 TABLET | Refills: 3 | Status: SHIPPED | OUTPATIENT
Start: 2025-04-16

## 2025-04-18 ENCOUNTER — OFFICE VISIT (OUTPATIENT)
Dept: ONCOLOGY | Facility: CLINIC | Age: 65
End: 2025-04-18
Payer: MEDICARE

## 2025-04-18 ENCOUNTER — INFUSION (OUTPATIENT)
Dept: ONCOLOGY | Facility: HOSPITAL | Age: 65
End: 2025-04-18
Payer: MEDICARE

## 2025-04-18 VITALS
WEIGHT: 220.6 LBS | DIASTOLIC BLOOD PRESSURE: 67 MMHG | BODY MASS INDEX: 29.88 KG/M2 | RESPIRATION RATE: 17 BRPM | OXYGEN SATURATION: 95 % | HEIGHT: 72 IN | SYSTOLIC BLOOD PRESSURE: 132 MMHG | HEART RATE: 54 BPM | TEMPERATURE: 97.7 F

## 2025-04-18 DIAGNOSIS — C78.7 MELANOMA OF UVEA METASTATIC TO LIVER: Primary | ICD-10-CM

## 2025-04-18 DIAGNOSIS — C69.32 MALIGNANT NEOPLASM OF LEFT CHOROID: ICD-10-CM

## 2025-04-18 DIAGNOSIS — C78.7 MELANOMA OF UVEA METASTATIC TO LIVER: ICD-10-CM

## 2025-04-18 DIAGNOSIS — C69.40 MELANOMA OF UVEA METASTATIC TO LIVER: Primary | ICD-10-CM

## 2025-04-18 DIAGNOSIS — C79.51 SECONDARY CANCER OF BONE: ICD-10-CM

## 2025-04-18 DIAGNOSIS — C69.40 MELANOMA OF UVEA METASTATIC TO LIVER: ICD-10-CM

## 2025-04-18 DIAGNOSIS — Z79.899 ENCOUNTER FOR LONG-TERM (CURRENT) USE OF HIGH-RISK MEDICATION: ICD-10-CM

## 2025-04-18 DIAGNOSIS — C78.7 CANCER, METASTATIC TO LIVER: ICD-10-CM

## 2025-04-18 DIAGNOSIS — G89.3 CANCER RELATED PAIN: ICD-10-CM

## 2025-04-18 DIAGNOSIS — C69.92 MALIGNANT NEOPLASM OF LEFT EYE: ICD-10-CM

## 2025-04-18 DIAGNOSIS — Z45.2 FITTING AND ADJUSTMENT OF VASCULAR CATHETER: ICD-10-CM

## 2025-04-18 LAB
ALBUMIN SERPL-MCNC: 3.6 G/DL (ref 3.5–5.2)
ALBUMIN/GLOB SERPL: 1.4 G/DL
ALP SERPL-CCNC: 64 U/L (ref 39–117)
ALT SERPL W P-5'-P-CCNC: 36 U/L (ref 1–41)
ANION GAP SERPL CALCULATED.3IONS-SCNC: 10.8 MMOL/L (ref 5–15)
AST SERPL-CCNC: 32 U/L (ref 1–40)
BASOPHILS # BLD AUTO: 0.08 10*3/MM3 (ref 0–0.2)
BASOPHILS NFR BLD AUTO: 0.8 % (ref 0–1.5)
BILIRUB SERPL-MCNC: 0.2 MG/DL (ref 0–1.2)
BUN SERPL-MCNC: 19 MG/DL (ref 8–23)
BUN/CREAT SERPL: 20.9 (ref 7–25)
CALCIUM SPEC-SCNC: 9 MG/DL (ref 8.6–10.5)
CHLORIDE SERPL-SCNC: 105 MMOL/L (ref 98–107)
CO2 SERPL-SCNC: 21.2 MMOL/L (ref 22–29)
CREAT SERPL-MCNC: 0.91 MG/DL (ref 0.76–1.27)
DEPRECATED RDW RBC AUTO: 55.4 FL (ref 37–54)
EGFRCR SERPLBLD CKD-EPI 2021: 93.5 ML/MIN/1.73
EOSINOPHIL # BLD AUTO: 0.51 10*3/MM3 (ref 0–0.4)
EOSINOPHIL NFR BLD AUTO: 4.9 % (ref 0.3–6.2)
ERYTHROCYTE [DISTWIDTH] IN BLOOD BY AUTOMATED COUNT: 16.9 % (ref 12.3–15.4)
GLOBULIN UR ELPH-MCNC: 2.6 GM/DL
GLUCOSE SERPL-MCNC: 132 MG/DL (ref 65–99)
HCT VFR BLD AUTO: 43.7 % (ref 37.5–51)
HGB BLD-MCNC: 13.7 G/DL (ref 13–17.7)
IMM GRANULOCYTES # BLD AUTO: 0.04 10*3/MM3 (ref 0–0.05)
IMM GRANULOCYTES NFR BLD AUTO: 0.4 % (ref 0–0.5)
LYMPHOCYTES # BLD AUTO: 2.07 10*3/MM3 (ref 0.7–3.1)
LYMPHOCYTES NFR BLD AUTO: 19.8 % (ref 19.6–45.3)
MAGNESIUM SERPL-MCNC: 2.2 MG/DL (ref 1.6–2.4)
MCH RBC QN AUTO: 28 PG (ref 26.6–33)
MCHC RBC AUTO-ENTMCNC: 31.4 G/DL (ref 31.5–35.7)
MCV RBC AUTO: 89.4 FL (ref 79–97)
MONOCYTES # BLD AUTO: 0.79 10*3/MM3 (ref 0.1–0.9)
MONOCYTES NFR BLD AUTO: 7.6 % (ref 5–12)
NEUTROPHILS NFR BLD AUTO: 6.96 10*3/MM3 (ref 1.7–7)
NEUTROPHILS NFR BLD AUTO: 66.5 % (ref 42.7–76)
NRBC BLD AUTO-RTO: 0 /100 WBC (ref 0–0.2)
PHOSPHATE SERPL-MCNC: 3.2 MG/DL (ref 2.5–4.5)
PLATELET # BLD AUTO: 294 10*3/MM3 (ref 140–450)
PMV BLD AUTO: 9.3 FL (ref 6–12)
POTASSIUM SERPL-SCNC: 4.6 MMOL/L (ref 3.5–5.2)
PROT SERPL-MCNC: 6.2 G/DL (ref 6–8.5)
RBC # BLD AUTO: 4.89 10*6/MM3 (ref 4.14–5.8)
SODIUM SERPL-SCNC: 137 MMOL/L (ref 136–145)
T4 FREE SERPL-MCNC: 1.06 NG/DL (ref 0.92–1.68)
TSH SERPL DL<=0.05 MIU/L-ACNC: 2.75 UIU/ML (ref 0.27–4.2)
WBC NRBC COR # BLD AUTO: 10.45 10*3/MM3 (ref 3.4–10.8)

## 2025-04-18 PROCEDURE — 85025 COMPLETE CBC W/AUTO DIFF WBC: CPT

## 2025-04-18 PROCEDURE — 96375 TX/PRO/DX INJ NEW DRUG ADDON: CPT

## 2025-04-18 PROCEDURE — 80053 COMPREHEN METABOLIC PANEL: CPT

## 2025-04-18 PROCEDURE — 84439 ASSAY OF FREE THYROXINE: CPT | Performed by: INTERNAL MEDICINE

## 2025-04-18 PROCEDURE — 25010000002 PEMBROLIZUMAB 100 MG/4ML SOLUTION 4 ML VIAL

## 2025-04-18 PROCEDURE — 84100 ASSAY OF PHOSPHORUS: CPT

## 2025-04-18 PROCEDURE — 96413 CHEMO IV INFUSION 1 HR: CPT

## 2025-04-18 PROCEDURE — 25010000002 HEPARIN LOCK FLUSH PER 10 UNITS: Performed by: INTERNAL MEDICINE

## 2025-04-18 PROCEDURE — 84443 ASSAY THYROID STIM HORMONE: CPT | Performed by: INTERNAL MEDICINE

## 2025-04-18 PROCEDURE — 83735 ASSAY OF MAGNESIUM: CPT

## 2025-04-18 PROCEDURE — 25010000002 ZOLEDRONIC ACID 4 MG/100ML SOLUTION

## 2025-04-18 RX ORDER — ZOLEDRONIC ACID 0.04 MG/ML
4 INJECTION, SOLUTION INTRAVENOUS ONCE
Status: COMPLETED | OUTPATIENT
Start: 2025-04-18 | End: 2025-04-18

## 2025-04-18 RX ORDER — SODIUM CHLORIDE 9 MG/ML
20 INJECTION, SOLUTION INTRAVENOUS ONCE
Status: CANCELLED | OUTPATIENT
Start: 2025-04-18

## 2025-04-18 RX ORDER — SODIUM CHLORIDE 0.9 % (FLUSH) 0.9 %
10 SYRINGE (ML) INJECTION AS NEEDED
Status: DISCONTINUED | OUTPATIENT
Start: 2025-04-18 | End: 2025-04-18 | Stop reason: HOSPADM

## 2025-04-18 RX ORDER — HYDROCODONE BITARTRATE AND ACETAMINOPHEN 5; 325 MG/1; MG/1
1 TABLET ORAL EVERY 8 HOURS PRN
Qty: 120 TABLET | Refills: 0 | Status: SHIPPED | OUTPATIENT
Start: 2025-04-18

## 2025-04-18 RX ORDER — HEPARIN SODIUM (PORCINE) LOCK FLUSH IV SOLN 100 UNIT/ML 100 UNIT/ML
500 SOLUTION INTRAVENOUS AS NEEDED
OUTPATIENT
Start: 2025-04-18

## 2025-04-18 RX ORDER — SODIUM CHLORIDE 0.9 % (FLUSH) 0.9 %
10 SYRINGE (ML) INJECTION AS NEEDED
OUTPATIENT
Start: 2025-04-18

## 2025-04-18 RX ORDER — HEPARIN SODIUM (PORCINE) LOCK FLUSH IV SOLN 100 UNIT/ML 100 UNIT/ML
500 SOLUTION INTRAVENOUS AS NEEDED
Status: DISCONTINUED | OUTPATIENT
Start: 2025-04-18 | End: 2025-04-18 | Stop reason: HOSPADM

## 2025-04-18 RX ADMIN — HEPARIN 500 UNITS: 100 SYRINGE at 12:57

## 2025-04-18 RX ADMIN — Medication 10 ML: at 12:57

## 2025-04-18 RX ADMIN — SODIUM CHLORIDE 400 MG: 900 INJECTION, SOLUTION INTRAVENOUS at 12:09

## 2025-04-18 RX ADMIN — ZOLEDRONIC ACID 4 MG: 0.04 INJECTION, SOLUTION INTRAVENOUS at 12:41

## 2025-04-18 NOTE — PROGRESS NOTES
REASON FOR FOLLOW-UP:     Provide an opinion on any further workup or treatment of metastatic melanoma.                             HISTORY OF PRESENT ILLNESS: Patient is 64 y.o. male presenting today for reevaluation.      He returns to clinic today on 4/18/2025 for Cycle 11 Keytruda. He reports that over the last three days he has experienced intermittent muscle weakness and arthralgias. He states he has been much more active in the last week and bowled 11 games on Monday, when he usually only bowls 3-4 games. He notes having this soreness before and it resolved. He continues to take prednisone 10mg a day. He states he is feeling better today with good energy levels. He continues on oral iron and is tolerating this well. He denies changes in bowel habits, skin rash, or chest pain. He does not intermittent shortness of breath since he had COVID about a month ago, but this too is improving. He states his pain is well controlled. He takes the norco between 4-6 times a day.        MEDICATIONS  Current: Pembrolizumab, hydrocodone, Eliquis, prednisone    Past Medical History:   Diagnosis Date    Acute renal failure (ARF) 09/2018    RESOLVED AFTER DEHYDRATION RESOLVED    Anemia     Arthritis     OA AND RA    Asthma     Atrial fibrillation with rapid ventricular response     Bone cancer     BPH (benign prostatic hyperplasia)     Chronic back pain     CKD (chronic kidney disease)     Stage 3    COPD (chronic obstructive pulmonary disease)     Coronary artery calcification seen on CAT scan     Eye cancer, left     TREATMENT WITH RADIATION. SOME VISON LOSS LEFT EYE    Gout     Hepatitis C     IN REMISSION. BEEN LONGER THAN 5 YRS    Hiatal hernia     History of atrial fibrillation     SEPT 2018. WITH SEVERE DEHYDRATION    History of blood transfusion 1978    in [place], no known reaction mild shortness of breath    Hypertension     Liver cancer     Lung cancer     MRSA infection     HX OF    Pneumonia     Psoriasis      "Right hip pain     Short of breath on exertion     Sleep apnea     \"MILD\". DID NOT FOLLOW THUR GETTING MACHINE RELATED TO COST OF MACHINE    Syncope 09/2018    secondary to atrial fibrillation with rapid ventricular response. SEVERELY DEHYDRATED AT THIS TIME.    Viral hepatitis     Vision loss of left eye     EYE CANCER    Vitamin D deficiency      Past Surgical History:   Procedure Laterality Date    BACK SURGERY      LUMBAR DISC X2. NO HARDWARE    CARPAL TUNNEL RELEASE WITH CUBITAL TUNNEL RELEASE Right     COLONOSCOPY      COLONOSCOPY N/A 07/30/2021    Procedure: COLONOSCOPY;  Surgeon: Flor Maciel MD;  Location: [facility] MAIN OR;  Service: Gastroenterology;  Laterality: N/A;  Diverticulosis    EYE SURGERY Left     Left Eye    HERNIA REPAIR  2016    LUMBAR SPINE SURGERY      x3 in 2000. 2010, 2011    REVISION TOTAL KNEE ARTHROPLASTY Right     TOTAL HIP ARTHROPLASTY Right 05/28/2019    Procedure: RIGHT TOTAL HIP ARTHROPLASTY;  Surgeon: Harish Dominguez MD;  Location: [facility] MAIN OR;  Service: Orthopedics    TOTAL KNEE ARTHROPLASTY Right     TOTAL KNEE ARTHROPLASTY Left 04/25/2024    Procedure: TOTAL KNEE ARTHROPLASTY;  Surgeon: Harish Dominguez MD;  Location: [facility];  Service: Orthopedics;  Laterality: Left;    VENOUS ACCESS DEVICE (PORT) INSERTION Right 8/23/2024    Procedure: INSERTION VENOUS ACCESS DEVICE;  Surgeon: Sal Wilhelm MD;  Location[place] [facility] [place];  Service: General;  Laterality: Right;         ONCOLOGY HISTORY:  The patient is a 63 y.o. year old male who is here for initial evaluation on 7/25/2024 for evaluation of newly diagnosed metastatic melanoma.  This patient has end-stage arthritis required bilateral knee replacement, history of left eye melanoma, lumbar stenosis status post discectomy, hepatitis C, hypertension, and psoriasis.    His primary doctor found lung issues first.  Patient reports he was congested with a cough and dyspnea for about a month.  Patient went to " see his primary care physician Dr. Joshua Blake on 4/17/2024 who prescribed doxycycline and had a chest ray examination which reported multiple pulmonary nodules.  He subsequently had a chest CT on 5/152024 which reported a 1.7 cm nodule opacity in the right lower lobe with additional scattered small bilateral pulmonary nodules.  It also reported slightly increased size of mildly asymmetric prominent left axillary lymph nodes.  There is also mild asymmetric elevation of the left hemidiaphragm with a new segmental atelectasis and scarring at the left lung base.    Patient has subsequently had a PET scan examination at the [facility], and the reported the right lower lobe 1.6 cm solid nodule with a maximum SUV 3.6.  Multiple additional bilateral subcentimeter solid nodules too small for accurate PET characterization but mainly had uptake greater than background 11.  There was no enlarged hypermetabolic mediastinal or hilar lymph nodes.  In the abdomen, there were 2 dominant hypermetabolic hypodense right hepatic lesions with reference 2.7 cm with SUV 11.1.  There is at least 3 additional smaller hypermetabolic pedicle foci with the left hepatic lobe SUV 3.7.  The lesion in the caudate lobe had a maximum SUV 5.5.  There are multiple pulm lesions, the T6 lesion had SUV 10.8, and the right ilium lesion maximum SUV 10.7, and the right sacrum lesion SUV 9.7, and the left supra-acetabular ilium lesion with SUV 4.6.    The patient was seen by thoracic surgeon Dr. Riggs on 7/11/2024, Dr. Riggs recommended CT-guided core needle biopsy of the liver lesion.  This was done on 7/19/2024.  Pathology evaluation reported metastatic melanoma.     Patient reports today that he was diagnosed with melanoma in his left eye approximately 12 years ago during a routine eye exam. At that time, his eyesight was unimpaired, but a patch was placed in his eye. He spent about a week in a room following the procedure. He lost  approximately 40 percent of his vision due to blockage. He received radiation therapy in [facility].     He experienced a minor headache a few weeks ago, which is unusual for him. He denies experiencing nausea, vomiting, or abdominal pain. His appetite is normal. He experienced weight loss of 10 pounds about 1 to 2 months ago, but has since regained it and is attempting to lose weight again.    He is scheduled for an MRI of the spine on 08/09/2024. His back pain, which he rates as 6 out of 10 at its worst, is located in the lower back at the sacrum area. He also reports weakness in his legs and is not currently taking any medication for the pain. He has consulted with a neurosurgeon and has informed them of his cancer diagnosis.    Laboratory Studies on 7/25/2024  Hemoglobin 13.7 MCV 92.4, platelets 282,000 WBC 9360 neutrophils 4490 lymphocytes 3170, monocytes 1000 eosinophil 540 basophil 100 and immature granulocytes 60.    Biopsy from the liver confirmed metastatic melanoma.      PET scan shows mixed results with some areas improving and others worsening. In the lungs, one nodule in the right lower lobe has an SUV of 1.8, down from 3.6, and is 1.6 cm in size. A few small lung nodules are present, with one measuring 8 mm. In the liver, activity is slightly higher with an SUV of 5.5, up from 5.1. The largest liver lesion measures 9 mm with SUV 4.7 down from previous 12 mm and SUV 5.2.  A separate nodule had SUV 5.5, up from previous SUV 5.1.  A portacaval lymph nodes 1 cm with SUV 5.8 from previous 1 cm with SUV 3.5.  The T6 lytic lesion with SUV 3.6, down from previous SUV 8.7.  Activity in the left sacrum has increased from SUV 9 to SUV 10. Activity in another area in the posterior left ilium has increased from SUV 4.9 to SUV 6.2.  A 1 cm soft tissue nodule between the right fourth and the fifth rib had SUV 9.2, up from 7 mm, and SUV 6.8. Activity in another area near the right 6th rib has increased from SUV  5.4 to SUV 5.9. Activity in the left leg muscles has increased from 3.6 to 5.3.    MEDICATIONS    Current Outpatient Medications:     albuterol (PROVENTIL HFA;VENTOLIN HFA) 108 (90 BASE) MCG/ACT inhaler, Inhale 2 puffs every 4 (four) hours as needed for wheezing., Disp: 1 inhaler, Rfl: 3    albuterol (PROVENTIL) (2.5 MG/3ML) 0.083% nebulizer solution, Inhale 2.5 mg., Disp: , Rfl:     allopurinol (ZYLOPRIM) 100 MG tablet, Take 1 tablet by mouth 2 (Two) Times a Day., Disp: , Rfl:     amLODIPine (NORVASC) 10 MG tablet, Take 1 tablet by mouth Daily., Disp: , Rfl: 6    apixaban (ELIQUIS) 5 MG tablet tablet, Take 1 tablet by mouth 2 (Two) Times a Day., Disp: 60 tablet, Rfl: 3    cholecalciferol (VITAMIN D3) 25 MCG (1000 UT) tablet, Take 1 tablet by mouth Daily., Disp: , Rfl:     Cyanocobalamin (VITAMIN B-12 PO), Take  by mouth., Disp: , Rfl:     diazePAM (VALIUM) 5 MG tablet, , Disp: , Rfl:     ferrous sulfate 325 (65 FE) MG tablet, Take 1 tablet by mouth 2 (Two) Times a Day With Meals., Disp: 60 tablet, Rfl: 3    gabapentin (NEURONTIN) 100 MG capsule, Take 1 capsule by mouth 3 (Three) Times a Day., Disp: , Rfl:     HYDROcodone-acetaminophen (NORCO) 5-325 MG per tablet, Take 1 tablet by mouth Every 8 (Eight) Hours As Needed for Moderate Pain., Disp: 120 tablet, Rfl: 0    hydrOXYzine (ATARAX) 25 MG tablet, Take 1 tablet by mouth 3 (Three) Times a Day As Needed for Itching., Disp: 90 tablet, Rfl: 2    lidocaine-prilocaine (EMLA) 2.5-2.5 % cream, Apply nickel-sized amount over Mediport site 30 min prior to access. Do not rub in., Disp: 30 g, Rfl: 2    methocarbamol (ROBAXIN) 750 MG tablet, Take 1 tablet by mouth 3 (Three) Times a Day., Disp: , Rfl:     metoprolol tartrate (LOPRESSOR) 25 MG tablet, Take 1 tablet by mouth Every 12 (Twelve) Hours., Disp: 180 tablet, Rfl: 3    Omega-3 Fatty Acids (fish oil) 1000 MG capsule capsule, Take 1 capsule by mouth Daily With Breakfast. HOLD PER MD INSTR, Disp: , Rfl:     ondansetron  (ZOFRAN) 8 MG tablet, Take 1 tablet by mouth., Disp: , Rfl:     Pembrolizumab (Keytruda) 100 MG/4ML solution, Infuse 8 mL into a venous catheter., Disp: , Rfl:     predniSONE (DELTASONE) 10 MG tablet, TAKE 1 TABLET DAILY, Disp: 90 tablet, Rfl: 3    tamsulosin (FLOMAX) 0.4 MG capsule 24 hr capsule, Take 1 capsule by mouth 2 (Two) Times a Day., Disp: , Rfl:     Trelegy Ellipta 200-62.5-25 MCG/ACT inhaler, Inhale 1 puff Daily., Disp: , Rfl:     Vitamin A 7.5 MG (92546 UT) capsule, 1 Cap, Oral, Cap, Daily, # 30 Cap, 0 Refill(s), Pharmacy: [id number], 182.88 cm, 10/14/24 18:58:00 EDT, CLINICALHEIGHT, 96.36, kg, 10/14/24 18:58:00 EDT, CLINICALWEIGHT, Disp: , Rfl:     vitamin D (ERGOCALCIFEROL) 1.25 MG (52320 UT) capsule capsule, Take 1 capsule by mouth 1 (One) Time Per Week., Disp: , Rfl:     acetaminophen (TYLENOL) 325 MG tablet, Take 2 tablets by mouth Every 6 (Six) Hours As Needed for Mild Pain or Headache. (Patient not taking: Reported on 2025), Disp: , Rfl:     ALLERGIES:   No Known Allergies    SOCIAL HISTORY:       Social History     Socioeconomic History    Marital status: Single   Tobacco Use    Smoking status: Never     Passive exposure: Never    Smokeless tobacco: Never   Vaping Use    Vaping status: Never Used   Substance and Sexual Activity    Alcohol use: No     Comment: no caffeine     Drug use: No    Sexual activity: Defer   He is a  but has never smoked. He used to drink 2 drinks a day, but he has not drunk in over 20 to 25 years.      FAMILY HISTORY:  Family History   Problem Relation Age of Onset    Depression Mother     COPD Mother     Mental illness Mother     Diabetes Father     Hypertension Father     Heart disease Father     Hyperlipidemia Father     Cancer Father     Liver cancer Father     Drug abuse Neg Hx     Allergies Neg Hx     Asthma Neg Hx     Stroke Neg Hx     Malig Hyperthermia Neg Hx    His father had liver, bone cancer. He  of the cancer.  No details  "available.         Vitals:    04/18/25 1108   BP: 132/67   Pulse: 54   Resp: 17   Temp: 97.7 °F (36.5 °C)   TempSrc: Oral   SpO2: 95%   Weight: 100 kg (220 lb 9.6 oz)   Height: 182.9 cm (72.01\")   PainSc: 6    PainLoc: Hand  Comment: shoulder, hips, and legs         4/18/2025    11:06 AM   Current Status   ECOG score 0       Physical Exam  Constitutional:       Appearance: Normal appearance.   Cardiovascular:      Rate and Rhythm: Normal rate.      Heart sounds: Normal heart sounds.   Pulmonary:      Effort: Pulmonary effort is normal.      Breath sounds: Normal breath sounds.   Abdominal:      General: Bowel sounds are normal.      Palpations: Abdomen is soft.   Musculoskeletal:      Right lower leg: No edema.      Left lower leg: No edema.   Skin:     Findings: No rash.   Neurological:      Mental Status: He is oriented to person, place, and time.       RECENT LABS:  Results from last 7 days   Lab Units 04/18/25  1055   WBC 10*3/mm3 10.45   NEUTROS ABS 10*3/mm3 6.96   HEMOGLOBIN g/dL 13.7   HEMATOCRIT % 43.7   PLATELETS 10*3/mm3 294     Results from last 7 days   Lab Units 04/18/25  1055   SODIUM mmol/L 137   POTASSIUM mmol/L 4.6   CHLORIDE mmol/L 105   CO2 mmol/L 21.2*   BUN mg/dL 19   CREATININE mg/dL 0.91   CALCIUM mg/dL 9.0   ALBUMIN g/dL 3.6   BILIRUBIN mg/dL 0.2   ALK PHOS U/L 64   ALT (SGPT) U/L 36   AST (SGOT) U/L 32   GLUCOSE mg/dL 132*   MAGNESIUM mg/dL 2.2             IMAGING:  No new imaging to review.       Assessment & Plan   1. Metastatic melanoma in the liver, bilateral lungs and bones.  Patient has a history of left eye melanoma 12 years ago.  Patient had a cough and x-ray subsequently chest CT scan 7/15/2024 showed left lower lobe pulmonary nodule 1.7 cm, and multiple other smaller pulmonary nodules.  PET scan on 7/31/2024 reported multiple hypermetabolic lesions. The patient also has multiple metastatic hypermetabolic bone lesions including the T6 vertebral body and also the bilateral iliac " wings and also the left sacrum close to the SI joint.   Patient had a liver biopsy confirmed to be metastatic melanoma.  I discussed with the patient the on 7/25/2024 that I recommended to test his tumor sample by [employer] NGS study to see if the patient will have BRAF mutation so he would be a candidate for oral TKI treatment. Then, we also would look into whether this patient would be a candidate for other targeted therapy as well as immunotherapy.   Arrangements will be made for brain MRI with and without contrast.  MRI on 8/5/2024 reported no evidence for intracranial metastatic disease.  [employer] NGS study reported stable MSI and TMB at 2.1 m/MB, negative mutation for BRAF, NRAS and KIT.  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   Brain MRI examination on 8/5/2024 reported no evidence for intracranial metastatic disease.  Discussed with the patient on 8/14/2024, he is not a candidate for targeted therapy using oral TKI due to lack of mutation from BRAF NRAS and KIT.  I discussed with the patient for immunotherapy.  We discussed the single agent pembrolizumab versus doublets using nivolumab plus ipilimumab.  Due to the side effects profile, I recommended using a single agent pembrolizumab every 3 weeks due to its better tolerance.  Patient is agreeable.   Had first cycle immunotherapy pembrolizumab 8/27/2024.  Patient presented for evaluation on 9/17/2024, he reports experiencing some skin pruritus without any visible rashes. He also reports improved energy and appetite from the low-dose prednisone 10 mg daily. He denies any nausea, vomiting, or diarrhea. He has been using his inhaler occasionally for mild shortness of breath, which is not a new symptom. Continue current immunotherapy with pembrolizumab.   10/8/2024: Proceed with Cycle #3 Keytruda. Tolerating well overall.   10/29/2024: Proceed with Cycle #4 Keytruda. Tolerating well.    After 4 cycles of  pembrolizumab, the PET scan revealed a mixed response, predominantly showing significant reduction in metastatic liver lesions and resolution of two small liver metastases. Notable improvement was also observed in some bone areas. However, two new small lesions were detected on the right chest wall. The images were reviewed with the patient and his wife, and it was recommended to continue pembrolizumab for another 4 cycles, followed by PET scan again. If the two areas on the right chest wall continue to grow while other areas respond, local radiation therapy will be considered.   1/24/2025 patient returns for cycle 8 pembrolizumab which is continued every 3 weeks.  He will be due for scans following this cycle and then follow-up with Dr. Ellis.   The PET scan on 2/7/2024 showed mixed response to Keytruda treatment, with some areas showing improvement and others worsening. The disease is currently in a small tumor burden, but there are signs of progression in multiple areas.  I discussed with the patient, recommended to continue immunotherapy with Keytruda.  However at the meantime we will refer patient to Dr. Wallace at the [facility] for potential clinical trials.    Patient had cycle 9 pembrolizumab on 2/14/2024.   He was seen by Dr. Wallace at the [facility] on 03/05/2025, who recommended continuing pembrolizumab and repeating a scan in 3 months for evaluation.   3/7/2025 he is due for cycle 10 pembrolizumab today.  A CT scan will be scheduled in 3 months for further evaluation. If the disease remains stable, he will continue pembrolizumab; otherwise, he will consult Dr. Almonte again if the disease progresses.  4/18/2025: Proceed today with Cycle 11 keytruda. Continues to tolerate treatment well, he does not intermittent arthralgias and muscle weakness which thankfully is starting to improve today. Encouraged him to reach out if this worsens.     2.  Metastatic cancer to  the bone.   PET scan examination showed multiple bone lesions.    Recommended to start the patient on IV Zometa no more than every 4 weeks.  Patient received first dose of Zometa on 7/31/2024.  Had good tolerance.  10/29/2024 Zometa was given and to be given every 6 weeks.   1/24/2025 patient continue Zometa which is continued every 6 weeks and due today  PET scan on 2/7/2025 showed mixed response with the bone mets, significant improvement at the T6 lytic lesion, however worsening in the left ilium.   3/7/2025 Zometa will be given IV, and repeat every 6 weeks.  4/18/2025: Proceed with Zometa today. Continue every 6 weeks.     3.  Cancer related pain.  PET scan examination showed multiple bone lesions including T6 vertebral body, and the sacrum and the pelvic bone..    The patient reports low back pain and I looked at the PET scan images. I do not think necessarily the small left SI joint area bone lesion causing the problem. He reports that he was seen by neurosurgeon and is scheduled to have a spine MRI examination on 08/09/2024.  I certainly would like to see the results of that to make final assessment, to see whether this patient would benefit from some radiation therapy or he has lower back pain from other course. He previously had multiple lumbar spine surgeries for pinched nerve/discectomy.  MRI for T/L spine examination at the [facility] 8/9/2024 for new reported no evidence for metastatic disease.  There were extensive multilevel spondylosis in the T-spine and the moderate narrowing of the spinal canal C7-T1 and T4-T5.  The L-spine MRI examination reported marrow replacing lesion in the left sacral gale 1.2 cm, and also the lesion in the left posterior iliac bone measuring 1.7 cm.  There was also evidence of postsurgical changes.   11/19/2024 3 He continues to experience low back pain on the left side. He reported taking three doses of meloxicam, which may have contributed to his renal injury. He  was advised against further use of meloxicam, and he expressed understanding.  Today Renzo Streeter reports a pain score of 6.  Given his pain assessment as noted, treatment options were discussed and the following options were decided upon as a follow-up plan to address the patient's pain: continuation of current treatment plan for pain.   4/18/2025: Renzo Streeter reports a pain score of 6.  Given his pain assessment as noted, treatment options were discussed and the following options were decided upon as a follow-up plan to address the patient's pain: continuation of current treatment plan for pain.    4. Generalized pain.  9/17/2024 he reports pain in his knees, back, and other areas. He has been taking Motrin for pain relief, approximately four times a day. Continue with Motrin as needed for pain management. If pain persists or worsens, consider alternative pain management strategies.  10/8/2024: He takes norco about once a week. Pain very well controlled.   2/14/2025 he reports pain in his shoulders and arms, with the right shoulder being more affected due to a past separation injury. The left shoulder also has pain but is less severe.   Renzo Streeter reports a pain score of 6.  Given his pain assessment as noted, treatment options were discussed and the following options were decided upon as a follow-up plan to address the patient's pain: 3/7/2025 he reports severe pain in his hands, arms, shoulders, and neck, which has been managed with hydrocodone 5 mg every 8 hours. He is advised to take hydrocodone up to every 4 hours if needed. A prescription for 120 tablets of hydrocodone 5 mg will be sent to his pharmacy. He is advised to monitor for constipation while on pain medication.  4/18/2025: he continues to experience intermittent musculoskeletal pain. Renzo Streeter reports a pain score of 6.  Given his pain assessment as noted, treatment options were discussed and the following options were decided  upon as a follow-up plan to address the patient's pain: continuation of current treatment plan for pain.      5. Mild leukocytosis.  Laboratory studies on 09/17/2024 show mild leukocytosis with WBC of 11,070. ANC 5660, eosinophils 820, lymphocytes 3440, and monocytes 990. Hemoglobin is normal at 14.2, and platelets are 243,000. CMP, liver function panel, electrolytes, and glucose are all normal. No immediate intervention is required; continue to monitor blood counts.  11/19/2024 lab study showed a slight improvement in leukocytosis, which is currently only marginally elevated at 10,850, with mostly elevated monocytes 1360 and eosinophils 620, but normal neutrophils and lymphocytes.  Ongoing leukocytosis noted today, 12/13/2024 with WBC 12.58.   12/30/2024 WBC 13,500, including ANC 8730.  This is probably related to steroids intra-articular injection.  Patient is afebrile.  No signs for infection. with an increase in eosinophils to 690, which could be due to seasonal allergies or a reaction to medications.   1/24/2025 WBC 11.8, ANC 6.9   2/14/2025 WBC 11.17 and ANC 7.58.  3/7/2025 laboratory studies show mild persistence of leukocytosis (WBC 11,320) with normal neutrophils (6330), lymphocytes (3350), and mildly elevated monocytes (1130).   4/18/2025: WBC 10.45.     6.  Vascular access.  Patient had a portacatheter placed by Dr. Wilhelm 8/23/2024.    7. Acute renal injury.  11/19/2024 renal function has significantly deteriorated, with a creatinine level of 2.29 and a BUN of 47, likely due to dehydration. He admitted to reduced water intake. Intravenous hydration with 1 L normal saline was recommended daily, and Keytruda was postponed until the coming Friday, with the hope of improved renal function by then. A lab study will be conducted this Friday prior to Keytruda administration. He was advised to increase water intake to maintain hydration at home.  Creatinine is normal today at 1.13  2/14/2025 creatinine 1.09,  BUN 24.  3/7/2025 improved creatinine 0.9 and BUN 18.  4/18/2025: Creatinine 0.91, BUN 19    8.  New pulmonary emboli   Hospitalization 12/8/2024 through 12/9/2024 secondary to new atrial fibrillation  CT angiogram noted a very small nonocclusive pulmonary.  Patient placed on Eliquis starter pack  Currently on Eliquis 10 mg twice daily.  He understands after completing 1 week of Eliquis he would reduce to 5 mg twice daily.  We reviewed signs and symptoms of bleeding for which to monitor for.   On 1/24/2025 patient continues Eliquis and denies bleeding or bruising.  On 3/7/2025 patient reports tolerating Eliquis anticoagulation.  4/18/2025: Continues on Eliquis 5mg twice a day. No bleeding concerns.     9. Depression.  On 12/30/2024 he reports feeling depressed occasionally, especially when he stays in bed for extended periods. He is not currently on any medication for depression. He is encouraged to engage in physical activity and exercise to help alleviate symptoms.   2/14/2025 patient reports no significant depression.   Patient is doing well.      10.  Atrial fibrillation  Hospitalization 12/8/2024 through 12/9/2024 secondary to new atrial fibrillation  Evaluation by cardiology with patient to begin metoprolol  Patient is on Eliquis anticoagulation.  He is currently on metoprolol and Eliquis for atrial fibrillation. He reports no episodes of atrial fibrillation since starting the medication. He will continue his current medication regimen with the adjusted metoprolol dosage.    11. Bradycardia.  On 2/14/2025 his heart rate is recorded at 46 bpm today, which is below the normal range. He reports that his heart rate has been intermittently in the 40s for years, particularly at night. He is not experiencing any lightheadedness or dizziness. He is advised to reduce his metoprolol dosage to half a pill, taken twice daily, due to its short-acting nature.  3/7/2025 heart rate 56.    12. Dyspnea.  2/14/2025 he reports  difficulty breathing and getting out of breath. He attributes this to residual effects from a past COVID-19 infection. A prescription for generic Symbicort has been provided, to be used as 2 puffs in the morning and 2 puffs at night. [facility].  3/7/2025 patient reports no difficulty with breathing.    13. Anemia.   3/7/2025 his hemoglobin is slightly trending down (13.1). Iron, B12, and folate levels will be checked to identify any deficiencies that might be contributing to his anemia and fatigue.  4/18/2025: Hgb 13.7. He continues on vitamin B12 and oral iron.     14. Psoriasis.  He reports that his psoriasis has worsened, likely due to immunotherapy. He has been using Selsun Blue without significant improvement. He will discuss alternative treatments with his rheumatologist in a couple of weeks.      PLAN:   Proceed with Cycle 11 palliative Keytruda today. Continue Keytruda every 6 weeks.   Proceed with Zometa today. Continue every 6 weeks.   Continue oral iron 325mg twice a day   Continue vitamin b12 1000mcg daily   Continue norco every 4 hours as needed.   Continue Eliquis 5mg twice a day   Continue hypertension management per PCP   PET scan scheduled on 5/23/2025  Return to clinic on 5/30/2025 for MD visit, scan review, Cycle 12 Keytruda, Zometa, and labs per protocol   Instructed to reach out sooner with any concerns.       Anneliese Moeller, ATILIO  04/18/2025

## 2025-05-23 ENCOUNTER — HOSPITAL ENCOUNTER (OUTPATIENT)
Dept: PET IMAGING | Facility: HOSPITAL | Age: 65
Discharge: HOME | End: 2025-05-23
Payer: MEDICARE

## 2025-05-23 DIAGNOSIS — C78.7 MELANOMA OF UVEA METASTATIC TO LIVER: ICD-10-CM

## 2025-05-23 DIAGNOSIS — C78.7 CANCER, METASTATIC TO LIVER: ICD-10-CM

## 2025-05-23 DIAGNOSIS — C69.40 MELANOMA OF UVEA METASTATIC TO LIVER: ICD-10-CM

## 2025-05-23 DIAGNOSIS — C69.32 MALIGNANT NEOPLASM OF LEFT CHOROID: ICD-10-CM

## 2025-05-23 DIAGNOSIS — C79.51 SECONDARY CANCER OF BONE: ICD-10-CM

## 2025-05-23 LAB — GLUCOSE BLDC GLUCOMTR-MCNC: 93 MG/DL (ref 70–130)

## 2025-05-23 PROCEDURE — A9552 F18 FDG: HCPCS | Performed by: INTERNAL MEDICINE

## 2025-05-23 PROCEDURE — 34310000005 FLUDEOXYGLUCOSE F18 SOLUTION: Performed by: INTERNAL MEDICINE

## 2025-05-23 PROCEDURE — 82948 REAGENT STRIP/BLOOD GLUCOSE: CPT

## 2025-05-23 PROCEDURE — 78816 PET IMAGE W/CT FULL BODY: CPT

## 2025-05-23 RX ADMIN — FLUDEOXYGLUCOSE F 18 1 DOSE: 200 INJECTION, SOLUTION INTRAVENOUS at 08:53

## 2025-05-30 ENCOUNTER — INFUSION (OUTPATIENT)
Dept: ONCOLOGY | Facility: HOSPITAL | Age: 65
End: 2025-05-30
Payer: MEDICARE

## 2025-05-30 ENCOUNTER — PATIENT OUTREACH (OUTPATIENT)
Dept: OTHER | Facility: HOSPITAL | Age: 65
End: 2025-05-30
Payer: MEDICARE

## 2025-05-30 ENCOUNTER — OFFICE VISIT (OUTPATIENT)
Dept: ONCOLOGY | Facility: CLINIC | Age: 65
End: 2025-05-30
Payer: MEDICARE

## 2025-05-30 VITALS
RESPIRATION RATE: 16 BRPM | HEART RATE: 46 BPM | HEIGHT: 72 IN | OXYGEN SATURATION: 96 % | TEMPERATURE: 97.8 F | BODY MASS INDEX: 29.57 KG/M2 | WEIGHT: 218.3 LBS | DIASTOLIC BLOOD PRESSURE: 73 MMHG | SYSTOLIC BLOOD PRESSURE: 122 MMHG

## 2025-05-30 DIAGNOSIS — C78.7 MELANOMA OF UVEA METASTATIC TO LIVER: ICD-10-CM

## 2025-05-30 DIAGNOSIS — G89.3 CANCER RELATED PAIN: Primary | ICD-10-CM

## 2025-05-30 DIAGNOSIS — Z79.899 ENCOUNTER FOR LONG-TERM (CURRENT) USE OF HIGH-RISK MEDICATION: ICD-10-CM

## 2025-05-30 DIAGNOSIS — C69.32 MALIGNANT NEOPLASM OF LEFT CHOROID: ICD-10-CM

## 2025-05-30 DIAGNOSIS — C78.7 CANCER, METASTATIC TO LIVER: ICD-10-CM

## 2025-05-30 DIAGNOSIS — C69.92 MALIGNANT NEOPLASM OF LEFT EYE: ICD-10-CM

## 2025-05-30 DIAGNOSIS — C79.51 SECONDARY CANCER OF BONE: ICD-10-CM

## 2025-05-30 DIAGNOSIS — C69.40 MELANOMA OF UVEA METASTATIC TO LIVER: ICD-10-CM

## 2025-05-30 DIAGNOSIS — C69.40 MELANOMA OF UVEA METASTATIC TO LIVER: Primary | ICD-10-CM

## 2025-05-30 DIAGNOSIS — C78.7 MELANOMA OF UVEA METASTATIC TO LIVER: Primary | ICD-10-CM

## 2025-05-30 LAB
ALBUMIN SERPL-MCNC: 3.8 G/DL (ref 3.5–5.2)
ALBUMIN/GLOB SERPL: 1.5 G/DL
ALP SERPL-CCNC: 71 U/L (ref 39–117)
ALT SERPL W P-5'-P-CCNC: 30 U/L (ref 1–41)
ANION GAP SERPL CALCULATED.3IONS-SCNC: 12.4 MMOL/L (ref 5–15)
AST SERPL-CCNC: 26 U/L (ref 1–40)
BASOPHILS # BLD AUTO: 0.09 10*3/MM3 (ref 0–0.2)
BASOPHILS NFR BLD AUTO: 0.8 % (ref 0–1.5)
BILIRUB SERPL-MCNC: 0.3 MG/DL (ref 0–1.2)
BUN SERPL-MCNC: 19.8 MG/DL (ref 8–23)
BUN/CREAT SERPL: 18 (ref 7–25)
CALCIUM SPEC-SCNC: 8.8 MG/DL (ref 8.6–10.5)
CHLORIDE SERPL-SCNC: 104 MMOL/L (ref 98–107)
CO2 SERPL-SCNC: 22.6 MMOL/L (ref 22–29)
CREAT SERPL-MCNC: 1.1 MG/DL (ref 0.76–1.27)
DEPRECATED RDW RBC AUTO: 56.5 FL (ref 37–54)
EGFRCR SERPLBLD CKD-EPI 2021: 74.5 ML/MIN/1.73
EOSINOPHIL # BLD AUTO: 0.48 10*3/MM3 (ref 0–0.4)
EOSINOPHIL NFR BLD AUTO: 4 % (ref 0.3–6.2)
ERYTHROCYTE [DISTWIDTH] IN BLOOD BY AUTOMATED COUNT: 16.5 % (ref 12.3–15.4)
GLOBULIN UR ELPH-MCNC: 2.5 GM/DL
GLUCOSE SERPL-MCNC: 120 MG/DL (ref 65–99)
HCT VFR BLD AUTO: 42.6 % (ref 37.5–51)
HGB BLD-MCNC: 12.8 G/DL (ref 13–17.7)
IMM GRANULOCYTES # BLD AUTO: 0.05 10*3/MM3 (ref 0–0.05)
IMM GRANULOCYTES NFR BLD AUTO: 0.4 % (ref 0–0.5)
LYMPHOCYTES # BLD AUTO: 2.77 10*3/MM3 (ref 0.7–3.1)
LYMPHOCYTES NFR BLD AUTO: 23.1 % (ref 19.6–45.3)
MAGNESIUM SERPL-MCNC: 2.1 MG/DL (ref 1.6–2.4)
MCH RBC QN AUTO: 27.6 PG (ref 26.6–33)
MCHC RBC AUTO-ENTMCNC: 30 G/DL (ref 31.5–35.7)
MCV RBC AUTO: 91.8 FL (ref 79–97)
MONOCYTES # BLD AUTO: 1.09 10*3/MM3 (ref 0.1–0.9)
MONOCYTES NFR BLD AUTO: 9.1 % (ref 5–12)
NEUTROPHILS NFR BLD AUTO: 62.6 % (ref 42.7–76)
NEUTROPHILS NFR BLD AUTO: 7.52 10*3/MM3 (ref 1.7–7)
NRBC BLD AUTO-RTO: 0 /100 WBC (ref 0–0.2)
PHOSPHATE SERPL-MCNC: 3.4 MG/DL (ref 2.5–4.5)
PLATELET # BLD AUTO: 275 10*3/MM3 (ref 140–450)
PMV BLD AUTO: 9.2 FL (ref 6–12)
POTASSIUM SERPL-SCNC: 4.3 MMOL/L (ref 3.5–5.2)
PROT SERPL-MCNC: 6.3 G/DL (ref 6–8.5)
RBC # BLD AUTO: 4.64 10*6/MM3 (ref 4.14–5.8)
SODIUM SERPL-SCNC: 139 MMOL/L (ref 136–145)
WBC NRBC COR # BLD AUTO: 12 10*3/MM3 (ref 3.4–10.8)

## 2025-05-30 PROCEDURE — 96409 CHEMO IV PUSH SNGL DRUG: CPT

## 2025-05-30 PROCEDURE — 85025 COMPLETE CBC W/AUTO DIFF WBC: CPT

## 2025-05-30 PROCEDURE — 96374 THER/PROPH/DIAG INJ IV PUSH: CPT

## 2025-05-30 PROCEDURE — 25010000002 ZOLEDRONIC ACID 4 MG/100ML SOLUTION: Performed by: INTERNAL MEDICINE

## 2025-05-30 PROCEDURE — 96413 CHEMO IV INFUSION 1 HR: CPT

## 2025-05-30 PROCEDURE — 25010000002 PEMBROLIZUMAB 100 MG/4ML SOLUTION 4 ML VIAL: Performed by: INTERNAL MEDICINE

## 2025-05-30 PROCEDURE — 84100 ASSAY OF PHOSPHORUS: CPT

## 2025-05-30 PROCEDURE — 80053 COMPREHEN METABOLIC PANEL: CPT

## 2025-05-30 PROCEDURE — 83735 ASSAY OF MAGNESIUM: CPT

## 2025-05-30 RX ORDER — ZOLEDRONIC ACID 0.04 MG/ML
4 INJECTION, SOLUTION INTRAVENOUS ONCE
Status: COMPLETED | OUTPATIENT
Start: 2025-05-30 | End: 2025-05-30

## 2025-05-30 RX ORDER — HYDROCODONE BITARTRATE AND ACETAMINOPHEN 7.5; 325 MG/1; MG/1
1 TABLET ORAL EVERY 6 HOURS PRN
Qty: 120 TABLET | Refills: 0 | Status: SHIPPED | OUTPATIENT
Start: 2025-05-30

## 2025-05-30 RX ORDER — IVERMECTIN 3 MG/1
12 TABLET ORAL 2 TIMES DAILY
COMMUNITY

## 2025-05-30 RX ORDER — SODIUM CHLORIDE 9 MG/ML
20 INJECTION, SOLUTION INTRAVENOUS ONCE
Status: CANCELLED | OUTPATIENT
Start: 2025-05-30

## 2025-05-30 RX ADMIN — SODIUM CHLORIDE 400 MG: 900 INJECTION, SOLUTION INTRAVENOUS at 15:15

## 2025-05-30 RX ADMIN — ZOLEDRONIC ACID 4 MG: 0.04 INJECTION, SOLUTION INTRAVENOUS at 15:51

## 2025-05-30 NOTE — PROGRESS NOTES
Reviewed chart. Patient with metastatic melanoma.    Met patient in infusion. He is doing ok.  Dr. Ellis discussed his PET results, which showed some areas of disease were smaller and some were larger. He will have another scan in 3 months.  The patient denies any significant issues with his immunotherapy infusions.     He denies questions or concerns.     Since he is stable on immunotherapy with no ongoing resource needs, I will close his case to navigation. Encouraged patient to call in the future if the need arises. Patient verbalized understanding.

## 2025-05-30 NOTE — PROGRESS NOTES
REASON FOR FOLLOW-UP:     Provide an opinion on any further workup or treatment of metastatic melanoma.                             HISTORY OF PRESENT ILLNESS: Patient is 64 y.o. male presenting today for reevaluation.    History of Present Illness  The patient is presenting today, 05/30/2025, for evaluation to discuss the results of a PET scan examination obtained on 05/23/2025, following his 11th cycle of immunotherapy with pembrolizumab.    He reports experiencing increased respiratory distress and severe dizziness upon standing, which he describes as one of the most intense episodes he has encountered. His appetite remains robust. He has been utilizing a nebulizer intermittently and believes that daily use may be beneficial for his respiratory symptoms. He recalls an incident approximately 5 months ago when his breathing difficulties began to escalate.    He is currently on antihypertensive medication and anticoagulants. His heart rate has consistently been low over the years. He was previously advised to reduce his metoprolol dosage to half a tablet daily but has been taking a full tablet twice daily due to difficulty in splitting the tablets. He does not monitor his blood pressure at home.    He has initiated treatment with ivermectin and is seeking advice on whether to continue this regimen.    He also reports widespread pain affecting his hands, arms, shoulders, neck, and other body parts, which he manages with analgesics as needed.    Results  Imaging   - PET scan: 05/23/2025, Scattered bilateral pulmonary nodules as before, a few of them may have minimally increasing size however remain below PET resolution. Scattered low focal hypermetabolism within the soft tissue/musculature, bone, right diaphragm and the liver as before. A new focal area of uptake within the right gluteus cass likely representing metastatic disease. Index hepatic lesion appears to have decreased in size and hypermetabolic activity.  "Left sacrum lesion also decreased in hypermetabolic activity. Additional osseous and soft tissue lesions have decreased in activity.   - PET scan of the left lung: Small nodules, 7 mm, previously 5 mm, so slightly bigger.   - PET scan of the right lung: Small nodules, 7 mm, previously 5 mm, so slightly bigger.            Past Medical History:   Diagnosis Date    Acute renal failure (ARF) 09/2018    RESOLVED AFTER DEHYDRATION RESOLVED    Anemia     Arthritis     OA AND RA    Asthma     Atrial fibrillation with rapid ventricular response     Bone cancer     BPH (benign prostatic hyperplasia)     Chronic back pain     CKD (chronic kidney disease)     Stage 3    COPD (chronic obstructive pulmonary disease)     Coronary artery calcification seen on CAT scan     Eye cancer, left     TREATMENT WITH RADIATION. SOME VISON LOSS LEFT EYE    Gout     Hepatitis C     IN REMISSION. BEEN LONGER THAN 5 YRS    Hiatal hernia     History of atrial fibrillation     SEPT 2018. WITH SEVERE DEHYDRATION    History of blood transfusion 1978    in [place], no known reaction mild shortness of breath    Hypertension     Liver cancer     Lung cancer     MRSA infection     HX OF    Pneumonia     Psoriasis     Right hip pain     Short of breath on exertion     Sleep apnea     \"MILD\". DID NOT FOLLOW THUR GETTING MACHINE RELATED TO COST OF MACHINE    Syncope 09/2018    secondary to atrial fibrillation with rapid ventricular response. SEVERELY DEHYDRATED AT THIS TIME.    Viral hepatitis     Vision loss of left eye     EYE CANCER    Vitamin D deficiency      Past Surgical History:   Procedure Laterality Date    BACK SURGERY      LUMBAR DISC X2. NO HARDWARE    CARPAL TUNNEL RELEASE WITH CUBITAL TUNNEL RELEASE Right     COLONOSCOPY      COLONOSCOPY N/A 07/30/2021    Procedure: COLONOSCOPY;  Surgeon: Flor Maciel MD;  [place] [facility] [place];  Service: Gastroenterology;  Laterality: N/A;  Diverticulosis    EYE SURGERY Left     Left Eye    " HERNIA REPAIR  2016    LUMBAR SPINE SURGERY      x3 in 2000. 2010, 2011    REVISION TOTAL KNEE ARTHROPLASTY Right     TOTAL HIP ARTHROPLASTY Right 05/28/2019    Procedure: RIGHT TOTAL HIP ARTHROPLASTY;  Surgeon: Harish Dominguez MD;  Location: [facility] MAIN OR;  Service: Orthopedics    TOTAL KNEE ARTHROPLASTY Right     TOTAL KNEE ARTHROPLASTY Left 04/25/2024    Procedure: TOTAL KNEE ARTHROPLASTY;  Surgeon: Harish Dominguez MD;  Location: [facility];  Service: Orthopedics;  Laterality: Left;    VENOUS ACCESS DEVICE (PORT) INSERTION Right 8/23/2024    Procedure: INSERTION VENOUS ACCESS DEVICE;  Surgeon: Sal Wilhelm MD;  Location: [facility] OR;  Service: General;  Laterality: Right;         ONCOLOGY HISTORY:  The patient is a 63 y.o. year old male who is here for initial evaluation on 7/25/2024 for evaluation of newly diagnosed metastatic melanoma.  This patient has end-stage arthritis required bilateral knee replacement, history of left eye melanoma, lumbar stenosis status post discectomy, hepatitis C, hypertension, and psoriasis.    His primary doctor found lung issues first.  Patient reports he was congested with a cough and dyspnea for about a month.  Patient went to see his primary care physician Dr. Joshua Blake on 4/17/2024 who prescribed doxycycline and had a chest ray examination which reported multiple pulmonary nodules.  He subsequently had a chest CT on 5/152024 which reported a 1.7 cm nodule opacity in the right lower lobe with additional scattered small bilateral pulmonary nodules.  It also reported slightly increased size of mildly asymmetric prominent left axillary lymph nodes.  There is also mild asymmetric elevation of the left hemidiaphragm with a new segmental atelectasis and scarring at the left lung base.    [facility] and the reported the right lower lobe 1.6 cm solid nodule with a maximum SUV 3.6.   Multiple additional bilateral subcentimeter solid nodules too small for accurate PET characterization but mainly had uptake greater than background 11.  There was no enlarged hypermetabolic mediastinal or hilar lymph nodes.  In the abdomen, there were 2 dominant hypermetabolic hypodense right hepatic lesions with reference 2.7 cm with SUV 11.1.  There is at least 3 additional smaller hypermetabolic pedicle foci with the left hepatic lobe SUV 3.7.  The lesion in the caudate lobe had a maximum SUV 5.5.  There are multiple pulm lesions, the T6 lesion had SUV 10.8, and the right ilium lesion maximum SUV 10.7, and the right sacrum lesion SUV 9.7, and the left supra-acetabular ilium lesion with SUV 4.6.    The patient was seen by thoracic surgeon Dr. Riggs on 7/11/2024, Dr. Riggs recommended CT-guided core needle biopsy of the liver lesion.  This was done on 7/19/2024.  Pathology evaluation reported metastatic melanoma.     Patient reports today that he was diagnosed with melanoma in his left eye approximately 12 years ago during a routine eye exam. At that time, his eyesight was unimpaired, but a patch was placed in his eye. He spent about a week in a room following the procedure. He lost approximately 40 percent of his vision due to blockage. He received radiation therapy in [facility].     He experienced a minor headache a few weeks ago, which is unusual for him. He denies experiencing nausea, vomiting, or abdominal pain. His appetite is normal. He experienced weight loss of 10 pounds about 1 to 2 months ago, but has since regained it and is attempting to lose weight again.    He is scheduled for an MRI of the spine on 08/09/2024. His back pain, which he rates as 6 out of 10 at its worst, is located in the lower back at the sacrum area. He also reports weakness in his legs and is not currently taking any medication for the pain. He has consulted with a neurosurgeon and has informed them of his cancer  diagnosis.    Laboratory Studies on 7/25/2024  Hemoglobin 13.7 MCV 92.4, platelets 282,000 WBC 9360 neutrophils 4490 lymphocytes 3170, monocytes 1000 eosinophil 540 basophil 100 and immature granulocytes 60.    Biopsy from the liver confirmed metastatic melanoma.      PET scan shows mixed results with some areas improving and others worsening. In the lungs, one nodule in the right lower lobe has an SUV of 1.8, down from 3.6, and is 1.6 cm in size. A few small lung nodules are present, with one measuring 8 mm. In the liver, activity is slightly higher with an SUV of 5.5, up from 5.1. The largest liver lesion measures 9 mm with SUV 4.7 down from previous 12 mm and SUV 5.2.  A separate nodule had SUV 5.5, up from previous SUV 5.1.  A portacaval lymph nodes 1 cm with SUV 5.8 from previous 1 cm with SUV 3.5.  The T6 lytic lesion with SUV 3.6, down from previous SUV 8.7.  Activity in the left sacrum has increased from SUV 9 to SUV 10. Activity in another area in the posterior left ilium has increased from SUV 4.9 to SUV 6.2.  A 1 cm soft tissue nodule between the right fourth and the fifth rib had SUV 9.2, up from 7 mm, and SUV 6.8. Activity in another area near the right 6th rib has increased from SUV 5.4 to SUV 5.9. Activity in the left leg muscles has increased from 3.6 to 5.3.    MEDICATIONS    Current Outpatient Medications:     albuterol (PROVENTIL HFA;VENTOLIN HFA) 108 (90 BASE) MCG/ACT inhaler, Inhale 2 puffs every 4 (four) hours as needed for wheezing., Disp: 1 inhaler, Rfl: 3    albuterol (PROVENTIL) (2.5 MG/3ML) 0.083% nebulizer solution, Inhale 2.5 mg., Disp: , Rfl:     allopurinol (ZYLOPRIM) 100 MG tablet, Take 1 tablet by mouth 2 (Two) Times a Day., Disp: , Rfl:     amLODIPine (NORVASC) 10 MG tablet, Take 1 tablet by mouth Daily., Disp: , Rfl: 6    apixaban (ELIQUIS) 5 MG tablet tablet, Take 1 tablet by mouth 2 (Two) Times a Day., Disp: 60 tablet, Rfl: 3    cholecalciferol (VITAMIN D3) 25 MCG (1000 UT)  tablet, Take 1 tablet by mouth Daily., Disp: , Rfl:     Cyanocobalamin (VITAMIN B-12 PO), Take  by mouth., Disp: , Rfl:     diazePAM (VALIUM) 5 MG tablet, , Disp: , Rfl:     ferrous sulfate 325 (65 FE) MG tablet, Take 1 tablet by mouth 2 (Two) Times a Day With Meals., Disp: 60 tablet, Rfl: 3    gabapentin (NEURONTIN) 100 MG capsule, Take 1 capsule by mouth 3 (Three) Times a Day., Disp: , Rfl:     HYDROcodone-acetaminophen (NORCO) 5-325 MG per tablet, Take 1 tablet by mouth Every 8 (Eight) Hours As Needed for Moderate Pain., Disp: 120 tablet, Rfl: 0    hydrOXYzine (ATARAX) 25 MG tablet, Take 1 tablet by mouth 3 (Three) Times a Day As Needed for Itching., Disp: 90 tablet, Rfl: 2    ivermectin (STROMECTOL) 3 MG tablet tablet, Take 4 tablets by mouth 2 (Two) Times a Day., Disp: , Rfl:     lidocaine-prilocaine (EMLA) 2.5-2.5 % cream, Apply nickel-sized amount over Mediport site 30 min prior to access. Do not rub in., Disp: 30 g, Rfl: 2    methocarbamol (ROBAXIN) 750 MG tablet, Take 1 tablet by mouth 3 (Three) Times a Day., Disp: , Rfl:     metoprolol tartrate (LOPRESSOR) 25 MG tablet, Take 1 tablet by mouth Every 12 (Twelve) Hours., Disp: 180 tablet, Rfl: 3    Omega-3 Fatty Acids (fish oil) 1000 MG capsule capsule, Take 1 capsule by mouth Daily With Breakfast. HOLD PER MD INSTR, Disp: , Rfl:     ondansetron (ZOFRAN) 8 MG tablet, Take 1 tablet by mouth., Disp: , Rfl:     Pembrolizumab (Keytruda) 100 MG/4ML solution, Infuse 8 mL into a venous catheter., Disp: , Rfl:     predniSONE (DELTASONE) 10 MG tablet, TAKE 1 TABLET DAILY, Disp: 90 tablet, Rfl: 3    tamsulosin (FLOMAX) 0.4 MG capsule 24 hr capsule, Take 1 capsule by mouth 2 (Two) Times a Day., Disp: , Rfl:     Trelegy Ellipta 200-62.5-25 MCG/ACT inhaler, Inhale 1 puff Daily., Disp: , Rfl:     Vitamin A 7.5 MG (97122 UT) capsule, 1 Cap, Oral, Cap, Daily, # 30 Cap, 0 Refill(s), Pharmacy: [id number], 182.88 cm, 10/14/24 18:58:00 EDT, CLINICALHEIGHT,  "96.36, kg, 10/14/24 18:58:00 EDT, CLINICALWEIGHT, Disp: , Rfl:     vitamin D (ERGOCALCIFEROL) 1.25 MG (13091 UT) capsule capsule, Take 1 capsule by mouth 1 (One) Time Per Week., Disp: , Rfl:     acetaminophen (TYLENOL) 325 MG tablet, Take 2 tablets by mouth Every 6 (Six) Hours As Needed for Mild Pain or Headache. (Patient not taking: Reported on 2025), Disp: , Rfl:     ALLERGIES:   No Known Allergies    SOCIAL HISTORY:       Social History     Socioeconomic History    Marital status: Single   Tobacco Use    Smoking status: Never     Passive exposure: Never    Smokeless tobacco: Never   Vaping Use    Vaping status: Never Used   Substance and Sexual Activity    Alcohol use: No     Comment: no caffeine     Drug use: No    Sexual activity: Defer   He is a  but has never smoked. He used to drink 2 drinks a day, but he has not drunk in over 20 to 25 years.      FAMILY HISTORY:  Family History   Problem Relation Age of Onset    Depression Mother     COPD Mother     Mental illness Mother     Diabetes Father     Hypertension Father     Heart disease Father     Hyperlipidemia Father     Cancer Father     Liver cancer Father     Drug abuse Neg Hx     Allergies Neg Hx     Asthma Neg Hx     Stroke Neg Hx     Malig Hyperthermia Neg Hx    His father had liver, bone cancer. He  of the cancer.  No details available.         Vitals:    25 1357   BP: 122/73   Pulse: (!) 46   Resp: 16   Temp: 97.8 °F (36.6 °C)   TempSrc: Oral   SpO2: 96%   Weight: 99 kg (218 lb 4.8 oz)   Height: 182.9 cm (72.01\")   PainSc: 6    PainLoc: Hand  Comment: whole body, worst on upper body         2025     1:52 PM   Current Status   ECOG score 0     Physical Exam  Constitutional: No acute distress. Temperature is 97.8.  Cardiovascular: Heart rate is 46. Blood pressure is 122/73.      Physical Exam  Constitutional:       Appearance: Normal appearance.   HENT:      Head: Normocephalic.   Eyes:      Conjunctiva/sclera: Conjunctivae " normal.   Cardiovascular:      Rate and Rhythm: Normal rate.      Heart sounds: Normal heart sounds.   Pulmonary:      Effort: Pulmonary effort is normal.      Breath sounds: Normal breath sounds.   Abdominal:      General: Bowel sounds are normal.      Palpations: Abdomen is soft.   Musculoskeletal:      Right lower leg: No edema.      Left lower leg: No edema.   Skin:     Findings: No rash.   Neurological:      Mental Status: Mental status is at baseline.   Psychiatric:         Thought Content: Thought content normal.       RECENT LABS:  Results from last 7 days   Lab Units 05/30/25  1322   WBC 10*3/mm3 12.00*   NEUTROS ABS 10*3/mm3 7.52*   HEMOGLOBIN g/dL 12.8*   HEMATOCRIT % 42.6   PLATELETS 10*3/mm3 275     Results from last 7 days   Lab Units 05/30/25  1322   SODIUM mmol/L 139   POTASSIUM mmol/L 4.3   CHLORIDE mmol/L 104   CO2 mmol/L 22.6   BUN mg/dL 19.8   CREATININE mg/dL 1.10   CALCIUM mg/dL 8.8   ALBUMIN g/dL 3.8   BILIRUBIN mg/dL 0.3   ALK PHOS U/L 71   ALT (SGPT) U/L 30   AST (SGOT) U/L 26   GLUCOSE mg/dL 120*             IMAGING:  NM PET/CT Whole Body  Narrative: F-18 FDG WHOLE-BODY PET FROM SKULL VERTEX TO FEET WITH PET/CT FUSION     HISTORY: Metastatic melanoma, restaging     TECHNIQUE: Radiation dose reduction techniques were utilized, including  automated exposure control and exposure modulation based on body size.   Blood glucose level at time of injection was 93 mg/dL. 6.9 mCi of F-18  FDG were injected and whole body PET was performed from skull vertex to  feet CT was obtained for localization and attenuation correction. Time  at injection 0853. PET start time 1042. Normalization method: Patient  weight     Compared with multiple PET CTs dating back to 5/31/2024     FINDINGS: As a point of reference, mediastinal blood pool demonstrates  max SUV of approximately 2.5. Please note evaluation of the pelvis is  suboptimal due to streak artifact from right total hip arthroplasty.  Additional evaluation  of the lower chest and abdomen is suboptimal due  to degree of respiratory motion.     Cervical adenopathy demonstrating suspicious uptake. The thyroid,  submandibular and parotid glands demonstrate roughly symmetric uptake.  Right Port-A-Cath tip terminates in the superior vena cava.     No hilar, mediastinal or axillary adenopathy demonstrating uptake  significant above the mediastinal blood pool. Scattered bilateral  pulmonary nodules are present, as before index lesion within the left  upper lobe measures 0.7 cm (previously 0.5 cm on 11/2/2024 and  12/8/2024). Index lesion within the right lower lobe measuring 0.7 cm  (previously 0.5 cm on 12/8/2024 and 11/12/2024). No pleural effusion or  pneumothorax. Asymmetric left basilar pulmonary opacification is not  significantly hypermetabolic and has a bandlike appearance. Areas of  intense, somewhat nodular uptake are present along the course of the  right hemidiaphragm. Index area along the right diaphragmatic francisco  demonstrates a max SUV of 5.8, as before. There is also focal intense  area of uptake within the right pleura superior laterally demonstrating  max SUV of 13.5 (previously 9.2), as before.     Please note evaluation of the pelvis is suboptimal due to what appears  to be seen phenomena from extensive intense uptake within the bladder  causing artifactual decrease in the degree of uptake within the  surrounding pelvis including within the bones. Additionally, streak  artifact from right total hip arthroplasty limits evaluation.     Short segment of moderate to intense uptake within the distal  rectum/anus. There is colonic diverticulosis. No suspicious  hypermetabolic abnormality seen in the gallbladder, pancreas, spleen,  adrenal glands or kidneys. Uptake within the liver is heterogenous and  speckled, limiting evaluation for hypermetabolic hepatic metastasis.  Previously seen hypermetabolic lesions are not as well seen. Index  lesion within the right  hepatic lobe demonstrates max SUV of 4.4 and  measures approximate 1.3 cm on pet imaging (previously 5.5 and 1.7 cm).  Partially calcified right renal lesion measures 1 cm, as before.  Subcentimeter renal lesions are too small to characterize. No  hydronephrosis.     No free intraperitoneal air is seen. The appendix is unremarkable.     Scattered hypermetabolic presumed hepatic metastasis are present. Index  lesion within the posterior left ilium demonstrates a max SUV of 7.4  (previously 6.2) index lesion within the left sacral ala demonstrates  max SUV of 5.9 (previously 10). Degenerative changes are present in the  bilateral feet. Postsurgical changes from bilateral total hip  arthroplasties with resultant significant streak artifact limit  evaluation of these areas. There are foci of intense uptake within the  musculature and soft tissues, as before. Index lesion within the right  gluteus cass demonstrates max SUV of 6.5 and was not seen on prior  PET/CT. Index focal area of uptake along the left anterior thigh  musculature demonstrates a max SUV of 7.8 (previously 5.3).     Impression: 1.  Please note the above-stated limitations.  2.  Scattered bilateral pulmonary nodules are present, as before, with a  few nodules which may have minimally increased in size; however remain  below PET resolution. Continued close attention on follow-up is  recommended to exclude worsening metastatic disease.  3.  Scattered focal hypermetabolism within the soft tissue/musculature,  bones, right diaphragm and liver, as before. There is a new focal area  of uptake within the right gluteus cass likely representing  metastatic disease.  4.  Index hepatic lesion appears to have decreased in size and degree of  FDG uptake. Lesion within the left sacrum as decreased in the degree of  uptake. Additional index osseous and soft tissue lesions have increased  in the degree of uptake. These findings are of indeterminate  clinical  significance.  5.  Asymmetric presumed atelectasis within the left lower lobe with  pneumonia much less likely given the lack of significant  hypermetabolism.  6.  Other findings above     This report was finalized on 5/27/2025 11:49 PM by Dr. Cheng Toro M.D on Workstation: [id number]             Assessment & Plan   Assessment & Plan      1. Metastatic melanoma in the liver, bilateral lungs and bones.  Patient has a history of left eye melanoma 12 years ago.  Patient had a cough and x-ray subsequently chest CT scan 7/15/2024 showed left lower lobe pulmonary nodule 1.7 cm, and multiple other smaller pulmonary nodules.  PET scan on 7/31/2024 reported multiple hypermetabolic lesions. The patient also has multiple metastatic hypermetabolic bone lesions including the T6 vertebral body and also the bilateral iliac wings and also the left sacrum close to the SI joint.   Patient had a liver biopsy confirmed to be metastatic melanoma.  I discussed with the patient the on 7/25/2024 that I recommended to test his tumor sample by [employer] NGS study to see if the patient will have BRAF mutation so he would be a candidate for oral TKI treatment. Then, we also would look into whether this patient would be a candidate for other targeted therapy as well as immunotherapy.   Arrangements will be made for brain MRI with and without contrast.  MRI on 8/5/2024 reported no evidence for intracranial metastatic disease.  [employer] N[facility] study reported stable MSI and TMB at 2.1 m/MB, negative mutation for BRAF, NRAS and KIT.  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   Brain MRI examination on 8/5/2024 reported no evidence for intracranial metastatic disease.  Discussed with the patient on 8/14/2024, he is not a candidate for targeted therapy using oral TKI due to lack of mutation from BRAF NRAS and KIT.  I discussed with the patient for immunotherapy.  We discussed the  single agent pembrolizumab versus doublets using nivolumab plus ipilimumab.  Due to the side effects profile, I recommended using a single agent pembrolizumab every 3 weeks due to its better tolerance.  Patient is agreeable.   Had first cycle immunotherapy pembrolizumab 8/27/2024.  Patient presented for evaluation on 9/17/2024, he reports experiencing some skin pruritus without any visible rashes. He also reports improved energy and appetite from the low-dose prednisone 10 mg daily. He denies any nausea, vomiting, or diarrhea. He has been using his inhaler occasionally for mild shortness of breath, which is not a new symptom. Continue current immunotherapy with pembrolizumab.   10/8/2024: Proceed with Cycle #3 Keytruda. Tolerating well overall.   10/29/2024: Proceed with Cycle #4 Keytruda. Tolerating well.    After 4 cycles of pembrolizumab, the PET scan revealed a mixed response, predominantly showing significant reduction in metastatic liver lesions and resolution of two small liver metastases. Notable improvement was also observed in some bone areas. However, two new small lesions were detected on the right chest wall. The images were reviewed with the patient and his wife, and it was recommended to continue pembrolizumab for another 4 cycles, followed by PET scan again. If the two areas on the right chest wall continue to grow while other areas respond, local radiation therapy will be considered.   1/24/2025 patient returns for cycle 8 pembrolizumab which is continued every 3 weeks.  He will be due for scans following this cycle and then follow-up with Dr. Ellis.   The PET scan on 2/7/2024 showed mixed response to Keytruda treatment, with some areas showing improvement and others worsening. The disease is currently in a small tumor burden, but there are signs of progression in multiple areas.  I discussed with the patient, recommended to continue immunotherapy with Keytruda.  However at the meantime we will  refer patient to Dr. Wallace at the [facility] for potential clinical trials.    Patient had cycle 9 pembrolizumab on 2/14/2024.   He was seen by Dr. Wallace at the [facility] on 03/05/2025, who recommended continuing pembrolizumab and repeating a scan in 3 months for evaluation.   3/7/2025 he is due for cycle 10 pembrolizumab today.  A CT scan will be scheduled in 3 months for further evaluation. If the disease remains stable, he will continue pembrolizumab; otherwise, he will consult Dr. Almonte again if the disease progresses.  4/18/2025: Proceed today with Cycle 11 keytruda. Continues to tolerate treatment well, he does not intermittent arthralgias and muscle weakness which thankfully is starting to improve today. Encouraged him to reach out if this worsens.    The PET scan on 5/23/2025 shows scattered bilateral pulmonary nodules, with some minimally increasing in size but remaining below PET resolution. There is scattered low focal hypermetabolism within the soft tissue/musculature, bone, right diaphragm, and liver as before. A new focal area of uptake within the right gluteus cass likely represents metastatic disease. The index hepatic lesion has decreased in size and hypermetabolic activity, and the left sacrum lesion also shows decreased hypermetabolic activity. Additional osseous and soft tissue lesions have decreased in activity.  I discussed with the patient today, he has more areas having more significant response compared to small pulmonary nodules, so I recommended to continue immunotherapy with pembrolizumab.       2.  Metastatic cancer to the bone.   PET scan examination showed multiple bone lesions.    Recommended to start the patient on IV Zometa no more than every 4 weeks.  Patient received first dose of Zometa on 7/31/2024.  Had good tolerance.  10/29/2024 Zometa was given and to be given every 6 weeks.   1/24/2025 patient continue Zometa which is  continued every 6 weeks and due today  PET scan on 2/7/2025 showed mixed response with the bone mets, significant improvement at the T6 lytic lesion, however worsening in the left ilium.   3/7/2025 Zometa will be given IV, and repeat every 6 weeks.  4/18/2025: Proceed with Zometa today. Continue every 6 weeks.     3.  Cancer related pain.  PET scan examination showed multiple bone lesions including T6 vertebral body, and the sacrum and the pelvic bone..    The patient reports low back pain and I looked at the PET scan images. I do not think necessarily the small left SI joint area bone lesion causing the problem. He reports that he was seen by neurosurgeon and is scheduled to have a spine MRI examination on 08/09/2024.  I certainly would like to see the results of that to make final assessment, to see whether this patient would benefit from some radiation therapy or he has lower back pain from other course. He previously had multiple lumbar spine surgeries for pinched nerve/discectomy.  MRI for T/L spine examination at the [facility] 8/9/2024 for new reported no evidence for metastatic disease.  There were extensive multilevel spondylosis in the T-spine and the moderate narrowing of the spinal canal C7-T1 and T4-T5.  The L-spine MRI examination reported marrow replacing lesion in the left sacral gale 1.2 cm, and also the lesion in the left posterior iliac bone measuring 1.7 cm.  There was also evidence of postsurgical changes.   11/19/2024 3 He continues to experience low back pain on the left side. He reported taking three doses of meloxicam, which may have contributed to his renal injury. He was advised against further use of meloxicam, and he expressed understanding.  Today Renzo Streeter reports a pain score of 6.  Given his pain assessment as noted, treatment options were discussed and the following options were decided upon as a follow-up plan to address the patient's pain: continuation of current  treatment plan for pain.       4. Generalized pain.  9/17/2024 he reports pain in his knees, back, and other areas. He has been taking Motrin for pain relief, approximately four times a day. Continue with Motrin as needed for pain management. If pain persists or worsens, consider alternative pain management strategies.  10/8/2024: He takes norco about once a week. Pain very well controlled.   2/14/2025 he reports pain in his shoulders and arms, with the right shoulder being more affected due to a past separation injury. The left shoulder also has pain but is less severe.   3/7/2025 he reports severe pain in his hands, arms, shoulders, and neck, which has been managed with hydrocodone 5 mg every 8 hours. He is advised to take hydrocodone up to every 4 hours if needed. A prescription for 120 tablets of hydrocodone 5 mg will be sent to his pharmacy. He is advised to monitor for constipation while on pain medication.  5/30/2025 he continues to experience intermittent musculoskeletal pain. Renzo Streeter reports a pain score of 6.  Given his pain assessment as noted, treatment options were discussed and the following options were decided upon as a follow-up plan to address the patient's pain: continuation of current treatment plan for pain.      5. Mild leukocytosis.  Laboratory studies on 09/17/2024 show mild leukocytosis with WBC of 11,070. ANC 5660, eosinophils 820, lymphocytes 3440, and monocytes 990. Hemoglobin is normal at 14.2, and platelets are 243,000. CMP, liver function panel, electrolytes, and glucose are all normal. No immediate intervention is required; continue to monitor blood counts.  11/19/2024 lab study showed a slight improvement in leukocytosis, which is currently only marginally elevated at 10,850, with mostly elevated monocytes 1360 and eosinophils 620, but normal neutrophils and lymphocytes.  Ongoing leukocytosis noted today, 12/13/2024 with WBC 12.58.   12/30/2024 WBC 13,500, including ANC  8730.  This is probably related to steroids intra-articular injection.  Patient is afebrile.  No signs for infection. with an increase in eosinophils to 690, which could be due to seasonal allergies or a reaction to medications.   1/24/2025 WBC 11.8, ANC 6.9   2/14/2025 WBC 11.17 and ANC 7.58.  3/7/2025 laboratory studies show mild persistence of leukocytosis (WBC 11,320) with normal neutrophils (6330), lymphocytes (3350), and mildly elevated monocytes (1130).   4/18/2025: WBC 10.45.     6.  Vascular access.  Patient had a portacatheter placed by Dr. Wilhelm 8/23/2024.    7. Acute renal injury.  11/19/2024 renal function has significantly deteriorated, with a creatinine level of 2.29 and a BUN of 47, likely due to dehydration. He admitted to reduced water intake. Intravenous hydration with 1 L normal saline was recommended daily, and Keytruda was postponed until the coming Friday, with the hope of improved renal function by then. A lab study will be conducted this Friday prior to Keytruda administration. He was advised to increase water intake to maintain hydration at home.  Creatinine is normal today at 1.13  2/14/2025 creatinine 1.09, BUN 24.  3/7/2025 improved creatinine 0.9 and BUN 18.  4/18/2025: Creatinine 0.91, BUN 19    8.  New pulmonary emboli   Hospitalization 12/8/2024 through 12/9/2024 secondary to new atrial fibrillation  CT angiogram noted a very small nonocclusive pulmonary.  Patient placed on Eliquis starter pack  Currently on Eliquis 10 mg twice daily.  He understands after completing 1 week of Eliquis he would reduce to 5 mg twice daily.  We reviewed signs and symptoms of bleeding for which to monitor for.   On 1/24/2025 patient continues Eliquis and denies bleeding or bruising.  On 3/7/2025 patient reports tolerating Eliquis anticoagulation.  4/18/2025: Continues on Eliquis 5mg twice a day. No bleeding concerns.     9. Depression.  On 12/30/2024 he reports feeling depressed occasionally,  especially when he stays in bed for extended periods. He is not currently on any medication for depression. He is encouraged to engage in physical activity and exercise to help alleviate symptoms.   2/14/2025 patient reports no significant depression.   Patient is doing well.      10.  Atrial fibrillation  Hospitalization 12/8/2024 through 12/9/2024 secondary to new atrial fibrillation  Evaluation by cardiology with patient to begin metoprolol  Patient is on Eliquis anticoagulation.  He is currently on metoprolol and Eliquis for atrial fibrillation. He reports no episodes of atrial fibrillation since starting the medication. He will continue his current medication regimen with the adjusted metoprolol dosage.    11. Bradycardia.  On 2/14/2025 his heart rate is recorded at 46 bpm today, which is below the normal range. He reports that his heart rate has been intermittently in the 40s for years, particularly at night. He is not experiencing any lightheadedness or dizziness. He is advised to reduce his metoprolol dosage to half a pill, taken twice daily, due to its short-acting nature.  3/7/2025 heart rate 56.   5/30/2025 heart rate is 46 today, which is lower than the usual in the mid 50s. Currently taking metoprolol every 12 hours for rate control due to atrial fibrillation.  We advised to reduce the dosage of metoprolol by half. If heart rate increases, dosage can be adjusted by taking a whole tablet in the morning and half a tablet in the evening. A pill splitter should be used to ensure accurate dosing.    12. Dizziness.  5/30/2025 reports experiencing dizziness, which may be related to blood pressure medication and low heart rate. Blood pressure today is 122/73, which is fair. Reducing the metoprolol dosage may help alleviate the dizziness.      13. Dyspnea.  2/14/2025 he reports difficulty breathing and getting out of breath. He attributes this to residual effects from a past COVID-19 infection. A prescription  for generic Symbicort has been provided, to be used as 2 puffs in the morning and 2 puffs at night. The prescription will be sent to [facility].  3/7/2025 patient reports no difficulty with breathing.    14. Anemia.   3/7/2025 his hemoglobin is slightly trending down (13.1). Iron, B12, and folate levels will be checked to identify any deficiencies that might be contributing to his anemia and fatigue.  4/18/2025: Hgb 13.7. He continues on vitamin B12 and oral iron.     15. Psoriasis.  He reports that his psoriasis has worsened, likely due to immunotherapy. He has been using Selsun Blue without significant improvement. He will discuss alternative treatments with his rheumatologist in a couple of weeks.      16. Medication management.  5/30/2025 patient reports that he has been taking ivermectin and asked my opinion about that.  I advised to discontinue its use as it is not recommended for treating cancer, due to lack of data.      PLAN: 5/30/2025  Proceed with Cycle 12 palliative Keytruda today. Continue Keytruda every 6 weeks.   Proceed with Zometa today. Continue every 6 weeks.   Continue oral iron 325mg twice a day   Continue vitamin b12 at 1000 mcg daily   Continue norco every 4 hours as needed.   Continue Eliquis 5mg twice a day   Continue hypertension management per PCP   I will see patient in 6 weeks for reevaluation with laboratory studies and ongoing immunotherapy with pembrolizumab.  Will also due for IV Zometa.    Instructed to reach out sooner with any concerns.     I spent 42 minutes caring for Renzo on this date of service. This time includes time spent by me in the following activities: preparing for the visit, reviewing tests, obtaining and/or reviewing a separately obtained history, performing a medically appropriate examination and/or evaluation, counseling and educating the patient/family/caregiver, ordering medications, tests, or procedures, referring and communicating with other health care  professionals, documenting information in the medical record, independently interpreting results and communicating that information with the patient/family/caregiver and care coordination       Jasmine Ellis MD PhD  05/30/2025

## 2025-06-10 RX ORDER — APIXABAN 5 MG/1
5 TABLET, FILM COATED ORAL 2 TIMES DAILY
Qty: 60 TABLET | Refills: 3 | Status: SHIPPED | OUTPATIENT
Start: 2025-06-10

## 2025-07-11 ENCOUNTER — INFUSION (OUTPATIENT)
Dept: ONCOLOGY | Facility: HOSPITAL | Age: 65
End: 2025-07-11
Payer: MEDICARE

## 2025-07-11 ENCOUNTER — OFFICE VISIT (OUTPATIENT)
Dept: ONCOLOGY | Facility: CLINIC | Age: 65
End: 2025-07-11
Payer: MEDICARE

## 2025-07-11 VITALS
HEART RATE: 62 BPM | RESPIRATION RATE: 16 BRPM | HEIGHT: 72 IN | TEMPERATURE: 97.7 F | WEIGHT: 215.5 LBS | DIASTOLIC BLOOD PRESSURE: 94 MMHG | SYSTOLIC BLOOD PRESSURE: 175 MMHG | OXYGEN SATURATION: 95 % | BODY MASS INDEX: 29.19 KG/M2

## 2025-07-11 DIAGNOSIS — C69.40 MELANOMA OF UVEA METASTATIC TO LIVER: ICD-10-CM

## 2025-07-11 DIAGNOSIS — C79.51 SECONDARY CANCER OF BONE: ICD-10-CM

## 2025-07-11 DIAGNOSIS — C69.92 MALIGNANT NEOPLASM OF LEFT EYE: ICD-10-CM

## 2025-07-11 DIAGNOSIS — Z79.899 ENCOUNTER FOR LONG-TERM (CURRENT) USE OF HIGH-RISK MEDICATION: ICD-10-CM

## 2025-07-11 DIAGNOSIS — C78.7 MELANOMA OF UVEA METASTATIC TO LIVER: ICD-10-CM

## 2025-07-11 DIAGNOSIS — C78.7 CANCER, METASTATIC TO LIVER: ICD-10-CM

## 2025-07-11 DIAGNOSIS — G89.4 CHRONIC PAIN SYNDROME: Primary | ICD-10-CM

## 2025-07-11 DIAGNOSIS — C69.32 MALIGNANT NEOPLASM OF LEFT CHOROID: ICD-10-CM

## 2025-07-11 DIAGNOSIS — G89.3 CANCER RELATED PAIN: ICD-10-CM

## 2025-07-11 DIAGNOSIS — C78.7 CANCER, METASTATIC TO LIVER: Primary | ICD-10-CM

## 2025-07-11 LAB
ALBUMIN SERPL-MCNC: 4 G/DL (ref 3.5–5.2)
ALBUMIN/GLOB SERPL: 1.7 G/DL
ALP SERPL-CCNC: 66 U/L (ref 39–117)
ALT SERPL W P-5'-P-CCNC: 34 U/L (ref 1–41)
ANION GAP SERPL CALCULATED.3IONS-SCNC: 9.6 MMOL/L (ref 5–15)
AST SERPL-CCNC: 28 U/L (ref 1–40)
BASOPHILS # BLD AUTO: 0.1 10*3/MM3 (ref 0–0.2)
BASOPHILS NFR BLD AUTO: 0.9 % (ref 0–1.5)
BILIRUB SERPL-MCNC: 0.4 MG/DL (ref 0–1.2)
BUN SERPL-MCNC: 21.7 MG/DL (ref 8–23)
BUN/CREAT SERPL: 22.4 (ref 7–25)
CALCIUM SPEC-SCNC: 8.9 MG/DL (ref 8.6–10.5)
CHLORIDE SERPL-SCNC: 109 MMOL/L (ref 98–107)
CO2 SERPL-SCNC: 22.4 MMOL/L (ref 22–29)
CREAT SERPL-MCNC: 0.97 MG/DL (ref 0.76–1.27)
DEPRECATED RDW RBC AUTO: 53.1 FL (ref 37–54)
EGFRCR SERPLBLD CKD-EPI 2021: 86.6 ML/MIN/1.73
EOSINOPHIL # BLD AUTO: 0.51 10*3/MM3 (ref 0–0.4)
EOSINOPHIL NFR BLD AUTO: 4.7 % (ref 0.3–6.2)
ERYTHROCYTE [DISTWIDTH] IN BLOOD BY AUTOMATED COUNT: 15.5 % (ref 12.3–15.4)
GLOBULIN UR ELPH-MCNC: 2.4 GM/DL
GLUCOSE SERPL-MCNC: 85 MG/DL (ref 65–99)
HCT VFR BLD AUTO: 43.8 % (ref 37.5–51)
HGB BLD-MCNC: 13.7 G/DL (ref 13–17.7)
IMM GRANULOCYTES # BLD AUTO: 0.04 10*3/MM3 (ref 0–0.05)
IMM GRANULOCYTES NFR BLD AUTO: 0.4 % (ref 0–0.5)
LYMPHOCYTES # BLD AUTO: 2.79 10*3/MM3 (ref 0.7–3.1)
LYMPHOCYTES NFR BLD AUTO: 25.7 % (ref 19.6–45.3)
MAGNESIUM SERPL-MCNC: 2.2 MG/DL (ref 1.6–2.4)
MCH RBC QN AUTO: 28.9 PG (ref 26.6–33)
MCHC RBC AUTO-ENTMCNC: 31.3 G/DL (ref 31.5–35.7)
MCV RBC AUTO: 92.4 FL (ref 79–97)
MONOCYTES # BLD AUTO: 1.1 10*3/MM3 (ref 0.1–0.9)
MONOCYTES NFR BLD AUTO: 10.1 % (ref 5–12)
NEUTROPHILS NFR BLD AUTO: 58.2 % (ref 42.7–76)
NEUTROPHILS NFR BLD AUTO: 6.31 10*3/MM3 (ref 1.7–7)
NRBC BLD AUTO-RTO: 0 /100 WBC (ref 0–0.2)
PHOSPHATE SERPL-MCNC: 3.5 MG/DL (ref 2.5–4.5)
PLATELET # BLD AUTO: 244 10*3/MM3 (ref 140–450)
PMV BLD AUTO: 9.5 FL (ref 6–12)
POTASSIUM SERPL-SCNC: 3.5 MMOL/L (ref 3.5–5.2)
PROT SERPL-MCNC: 6.4 G/DL (ref 6–8.5)
RBC # BLD AUTO: 4.74 10*6/MM3 (ref 4.14–5.8)
SODIUM SERPL-SCNC: 141 MMOL/L (ref 136–145)
WBC NRBC COR # BLD AUTO: 10.85 10*3/MM3 (ref 3.4–10.8)

## 2025-07-11 PROCEDURE — 25010000002 PEMBROLIZUMAB 100 MG/4ML SOLUTION 4 ML VIAL: Performed by: INTERNAL MEDICINE

## 2025-07-11 PROCEDURE — 80053 COMPREHEN METABOLIC PANEL: CPT

## 2025-07-11 PROCEDURE — 83735 ASSAY OF MAGNESIUM: CPT

## 2025-07-11 PROCEDURE — 96413 CHEMO IV INFUSION 1 HR: CPT

## 2025-07-11 PROCEDURE — 25010000002 ZOLEDRONIC ACID 4 MG/100ML SOLUTION: Performed by: INTERNAL MEDICINE

## 2025-07-11 PROCEDURE — 85025 COMPLETE CBC W/AUTO DIFF WBC: CPT

## 2025-07-11 PROCEDURE — 96375 TX/PRO/DX INJ NEW DRUG ADDON: CPT

## 2025-07-11 PROCEDURE — 84100 ASSAY OF PHOSPHORUS: CPT

## 2025-07-11 RX ORDER — ZOLEDRONIC ACID 0.04 MG/ML
4 INJECTION, SOLUTION INTRAVENOUS ONCE
Status: CANCELLED | OUTPATIENT
Start: 2025-07-11

## 2025-07-11 RX ORDER — MORPHINE SULFATE 15 MG/1
15 TABLET, FILM COATED, EXTENDED RELEASE ORAL 2 TIMES DAILY
Qty: 60 TABLET | Refills: 0 | Status: SHIPPED | OUTPATIENT
Start: 2025-07-11

## 2025-07-11 RX ORDER — SODIUM CHLORIDE 9 MG/ML
20 INJECTION, SOLUTION INTRAVENOUS ONCE
Status: CANCELLED | OUTPATIENT
Start: 2025-07-11

## 2025-07-11 RX ORDER — HYDROCODONE BITARTRATE AND ACETAMINOPHEN 7.5; 325 MG/1; MG/1
1 TABLET ORAL EVERY 6 HOURS PRN
Qty: 120 TABLET | Refills: 0 | Status: SHIPPED | OUTPATIENT
Start: 2025-07-11

## 2025-07-11 RX ORDER — ZOLEDRONIC ACID 0.04 MG/ML
4 INJECTION, SOLUTION INTRAVENOUS ONCE
Status: COMPLETED | OUTPATIENT
Start: 2025-07-11 | End: 2025-07-11

## 2025-07-11 RX ADMIN — ZOLEDRONIC ACID 4 MG: 0.04 INJECTION, SOLUTION INTRAVENOUS at 09:53

## 2025-07-11 RX ADMIN — SODIUM CHLORIDE 400 MG: 9 INJECTION, SOLUTION INTRAVENOUS at 10:08

## 2025-07-11 NOTE — PROGRESS NOTES
REASON FOR FOLLOW-UP:     Provide an opinion on any further workup or treatment of metastatic melanoma.                             HISTORY OF PRESENT ILLNESS: Patient is 64 y.o. male presenting today for reevaluation.    History of Present Illness    The patient presented today on 07/11/2025 for evaluation prior to Keytruda treatment for metastatic melanoma. He is also due for Zometa treatment today, both repeating every 6 weeks.    He reports an increase in shortness of breath and persistent pain, which he rates as 7 out of 10 today. The pain is primarily located in his back, but also affects his hands, neck, and knees, with the location varying. He experiences occasional coughing and expectoration. His appetite remains good. He takes pain medication every 4 hours, although some days he requires it less frequently. He has not tried morphine for pain management.     He continues to take Eliquis without any issues of bleeding or bruising, except for a minor scratch from his dog. Despite these symptoms, he maintains an active lifestyle, bowling 3 days a week and occasionally playing up to 19 games in one session. He receives injections in his back from a pain management specialist. He has a long history of arthritis pain, which has worsened over time.    PAST SURGICAL HISTORY:  He has undergone 5 back surgeries, both knee replacements, and one hip replacement.    Results    Labs   - CMP: 07/11/2025, Normal   - Hemoglobin: 07/11/2025, 13.7 g/dL   - Platelets: 07/11/2025, 244,000 /µL   - WBC: 07/11/2025, 10,850 /µL including neutrophils 6310 /µL and monocytes 1100 /µL   - Eosinophil: 07/11/2025, 510 /µL   - Magnesium: 07/11/2025, Normal   - Phosphorus: 07/11/2025, Normal            Past Medical History:   Diagnosis Date    Acute renal failure (ARF) 09/2018    RESOLVED AFTER DEHYDRATION RESOLVED    Anemia     Arthritis     OA AND RA    Asthma     Atrial fibrillation with rapid ventricular response     Bone cancer      "BPH (benign prostatic hyperplasia)     Chronic back pain     CKD (chronic kidney disease)     Stage 3    COPD (chronic obstructive pulmonary disease)     Coronary artery calcification seen on CAT scan     Eye cancer, left     TREATMENT WITH RADIATION. SOME VISON LOSS LEFT EYE    Gout     Hepatitis C     IN REMISSION. BEEN LONGER THAN 5 YRS    Hiatal hernia     History of atrial fibrillation     SEPT 2018. WITH SEVERE DEHYDRATION    History of blood transfusion 1978    in [place], no known reaction mild shortness of breath    Hypertension     Liver cancer     Lung cancer     MRSA infection     HX OF    Pneumonia     Psoriasis     Right hip pain     Short of breath on exertion     Sleep apnea     \"MILD\". DID NOT FOLLOW THUR GETTING MACHINE RELATED TO COST OF MACHINE    Syncope 09/2018    secondary to atrial fibrillation with rapid ventricular response. SEVERELY DEHYDRATED AT THIS TIME.    Viral hepatitis     Vision loss of left eye     EYE CANCER    Vitamin D deficiency      Past Surgical History:   Procedure Laterality Date    BACK SURGERY      LUMBAR DISC X2. NO HARDWARE    CARPAL TUNNEL RELEASE WITH CUBITAL TUNNEL RELEASE Right     COLONOSCOPY      COLONOSCOPY N/A 07/30/2021    Procedure: COLONOSCOPY;  Surgeon: Flor Maciel MD;  [place] [facility] [place];  Service: Gastroenterology;  Laterality: N/A;  Diverticulosis    EYE SURGERY Left     Left Eye    HERNIA REPAIR  2016    LUMBAR SPINE SURGERY      x3 in 2000. 2010, 2011    REVISION TOTAL KNEE ARTHROPLASTY Right     TOTAL HIP ARTHROPLASTY Right 05/28/2019    Procedure: RIGHT TOTAL HIP ARTHROPLASTY;  Surgeon: Harish Dominguez MD;  Location: [facility] MAIN OR;  Service: Orthopedics    TOTAL KNEE ARTHROPLASTY Right     TOTAL KNEE ARTHROPLASTY Left 04/25/2024    Procedure: TOTAL KNEE ARTHROPLASTY;  Surgeon: Harish Dominguez MD;  Location: [facility] OR [facility];  Service: Orthopedics;  Laterality: Left;    VENOUS ACCESS DEVICE (PORT) INSERTION Right 8/23/2024    " Procedure: INSERTION VENOUS ACCESS DEVICE;  Surgeon: Sal Wilhelm MD;  Location: [facility] MAIN OR;  Service: General;  Laterality: Right;         ONCOLOGY HISTORY:  The patient is a 63 y.o. year old male who is here for initial evaluation on 7/25/2024 for evaluation of newly diagnosed metastatic melanoma.  This patient has end-stage arthritis required bilateral knee replacement, history of left eye melanoma, lumbar stenosis status post discectomy, hepatitis C, hypertension, and psoriasis.    His primary doctor found lung issues first.  Patient reports he was congested with a cough and dyspnea for about a month.  Patient went to see his primary care physician Dr. Joshua Blake on 4/17/2024 who prescribed doxycycline and had a chest ray examination which reported multiple pulmonary nodules.  He subsequently had a chest CT on 5/152024 which reported a 1.7 cm nodule opacity in the right lower lobe with additional scattered small bilateral pulmonary nodules.  It also reported slightly increased size of mildly asymmetric prominent left axillary lymph nodes.  There is also mild asymmetric elevation of the left hemidiaphragm with a new segmental atelectasis and scarring at the left lung base.    Patient has subsequently had a PET scan examination at the [facility], and the reported the right lower lobe 1.6 cm solid nodule with a maximum SUV 3.6.  Multiple additional bilateral subcentimeter solid nodules too small for accurate PET characterization but mainly had uptake greater than background 11.  There was no enlarged hypermetabolic mediastinal or hilar lymph nodes.  In the abdomen, there were 2 dominant hypermetabolic hypodense right hepatic lesions with reference 2.7 cm with SUV 11.1.  There is at least 3 additional smaller hypermetabolic pedicle foci with the left hepatic lobe SUV 3.7.  The lesion in the caudate lobe had a maximum SUV 5.5.  There are multiple pulm lesions, the T6 lesion had SUV  10.8, and the right ilium lesion maximum SUV 10.7, and the right sacrum lesion SUV 9.7, and the left supra-acetabular ilium lesion with SUV 4.6.    The patient was seen by thoracic surgeon Dr. Riggs on 7/11/2024, Dr. Riggs recommended CT-guided core needle biopsy of the liver lesion.  This was done on 7/19/2024.  Pathology evaluation reported metastatic melanoma.     Patient reports today that he was diagnosed with melanoma in his left eye approximately 12 years ago during a routine eye exam. At that time, his eyesight was unimpaired, but a patch was placed in his eye. He spent about a week in a room following the procedure. He lost approximately 40 percent of his vision due to blockage. He received radiation therapy in [facility].     He experienced a minor headache a few weeks ago, which is unusual for him. He denies experiencing nausea, vomiting, or abdominal pain. His appetite is normal. He experienced weight loss of 10 pounds about 1 to 2 months ago, but has since regained it and is attempting to lose weight again.    He is scheduled for an MRI of the spine on 08/09/2024. His back pain, which he rates as 6 out of 10 at its worst, is located in the lower back at the sacrum area. He also reports weakness in his legs and is not currently taking any medication for the pain. He has consulted with a neurosurgeon and has informed them of his cancer diagnosis.    Laboratory Studies on 7/25/2024  Hemoglobin 13.7 MCV 92.4, platelets 282,000 WBC 9360 neutrophils 4490 lymphocytes 3170, monocytes 1000 eosinophil 540 basophil 100 and immature granulocytes 60.    Biopsy from the liver confirmed metastatic melanoma.      PET scan shows mixed results with some areas improving and others worsening. In the lungs, one nodule in the right lower lobe has an SUV of 1.8, down from 3.6, and is 1.6 cm in size. A few small lung nodules are present, with one measuring 8 mm. In the liver, activity is slightly higher with an SUV of 5.5, up  from 5.1. The largest liver lesion measures 9 mm with SUV 4.7 down from previous 12 mm and SUV 5.2.  A separate nodule had SUV 5.5, up from previous SUV 5.1.  A portacaval lymph nodes 1 cm with SUV 5.8 from previous 1 cm with SUV 3.5.  The T6 lytic lesion with SUV 3.6, down from previous SUV 8.7.  Activity in the left sacrum has increased from SUV 9 to SUV 10. Activity in another area in the posterior left ilium has increased from SUV 4.9 to SUV 6.2.  A 1 cm soft tissue nodule between the right fourth and the fifth rib had SUV 9.2, up from 7 mm, and SUV 6.8. Activity in another area near the right 6th rib has increased from SUV 5.4 to SUV 5.9. Activity in the left leg muscles has increased from 3.6 to 5.3.    MEDICATIONS    Current Outpatient Medications:     albuterol (PROVENTIL HFA;VENTOLIN HFA) 108 (90 BASE) MCG/ACT inhaler, Inhale 2 puffs every 4 (four) hours as needed for wheezing., Disp: 1 inhaler, Rfl: 3    albuterol (PROVENTIL) (2.5 MG/3ML) 0.083% nebulizer solution, Inhale 2.5 mg., Disp: , Rfl:     allopurinol (ZYLOPRIM) 100 MG tablet, Take 1 tablet by mouth 2 (Two) Times a Day., Disp: , Rfl:     amLODIPine (NORVASC) 10 MG tablet, Take 1 tablet by mouth Daily., Disp: , Rfl: 6    cholecalciferol (VITAMIN D3) 25 MCG (1000 UT) tablet, Take 1 tablet by mouth Daily., Disp: , Rfl:     Cyanocobalamin (VITAMIN B-12 PO), Take  by mouth., Disp: , Rfl:     diazePAM (VALIUM) 5 MG tablet, , Disp: , Rfl:     Eliquis 5 MG tablet tablet, TAKE 1 TABLET BY MOUTH TWICE DAILY, Disp: 60 tablet, Rfl: 3    ferrous sulfate 325 (65 FE) MG tablet, Take 1 tablet by mouth 2 (Two) Times a Day With Meals., Disp: 60 tablet, Rfl: 3    gabapentin (NEURONTIN) 100 MG capsule, Take 1 capsule by mouth 3 (Three) Times a Day., Disp: , Rfl:     HYDROcodone-acetaminophen (NORCO) 7.5-325 MG per tablet, Take 1 tablet by mouth Every 6 (Six) Hours As Needed for Moderate Pain., Disp: 120 tablet, Rfl: 0    hydrOXYzine (ATARAX) 25 MG tablet, Take 1  tablet by mouth 3 (Three) Times a Day As Needed for Itching., Disp: 90 tablet, Rfl: 2    ivermectin (STROMECTOL) 3 MG tablet tablet, Take 4 tablets by mouth 2 (Two) Times a Day., Disp: , Rfl:     lidocaine-prilocaine (EMLA) 2.5-2.5 % cream, Apply nickel-sized amount over Mediport site 30 min prior to access. Do not rub in., Disp: 30 g, Rfl: 2    methocarbamol (ROBAXIN) 750 MG tablet, Take 1 tablet by mouth 3 (Three) Times a Day., Disp: , Rfl:     metoprolol tartrate (LOPRESSOR) 25 MG tablet, Take 1 tablet by mouth Every 12 (Twelve) Hours., Disp: 180 tablet, Rfl: 3    Omega-3 Fatty Acids (fish oil) 1000 MG capsule capsule, Take 1 capsule by mouth Daily With Breakfast. HOLD PER MD TRIPLETT, Disp: , Rfl:     ondansetron (ZOFRAN) 8 MG tablet, Take 1 tablet by mouth., Disp: , Rfl:     Pembrolizumab (Keytruda) 100 MG/4ML solution, Infuse 8 mL into a venous catheter., Disp: , Rfl:     predniSONE (DELTASONE) 10 MG tablet, TAKE 1 TABLET DAILY, Disp: 90 tablet, Rfl: 3    tamsulosin (FLOMAX) 0.4 MG capsule 24 hr capsule, Take 1 capsule by mouth 2 (Two) Times a Day., Disp: , Rfl:     Trelegy Ellipta 200-62.5-25 MCG/ACT inhaler, Inhale 1 puff Daily., Disp: , Rfl:     Vitamin A 7.5 MG (82830 UT) capsule, 1 Cap, Oral, Cap, Daily, # 30 Cap, 0 Refill(s), Pharmacy: [id number], 182.88 cm, 10/14/24 18:58:00 EDT, CLINICALHEIGHT, 96.36, kg, 10/14/24 18:58:00 EDT, CLINICALWEIGHT, Disp: , Rfl:     vitamin D (ERGOCALCIFEROL) 1.25 MG (57551 UT) capsule capsule, Take 1 capsule by mouth 1 (One) Time Per Week., Disp: , Rfl:     acetaminophen (TYLENOL) 325 MG tablet, Take 2 tablets by mouth Every 6 (Six) Hours As Needed for Mild Pain or Headache. (Patient not taking: Reported on 4/18/2025), Disp: , Rfl:     ALLERGIES:   No Known Allergies    SOCIAL HISTORY:       Social History     Socioeconomic History    Marital status: Single   Tobacco Use    Smoking status: Never     Passive exposure: Never    Smokeless tobacco: Never   Vaping  "Use    Vaping status: Never Used   Substance and Sexual Activity    Alcohol use: No     Comment: no caffeine     Drug use: No    Sexual activity: Defer   He is a  but has never smoked. He used to drink 2 drinks a day, but he has not drunk in over 20 to 25 years.      FAMILY HISTORY:  Family History   Problem Relation Age of Onset    Depression Mother     COPD Mother     Mental illness Mother     Diabetes Father     Hypertension Father     Heart disease Father     Hyperlipidemia Father     Cancer Father     Liver cancer Father     Drug abuse Neg Hx     Allergies Neg Hx     Asthma Neg Hx     Stroke Neg Hx     Malig Hyperthermia Neg Hx    His father had liver, bone cancer. He  of the cancer.  No details available.         Vitals:    25 0844   BP: 175/94   Pulse: 62   Resp: 16   Temp: 97.7 °F (36.5 °C)   TempSrc: Oral   SpO2: 95%   Weight: 97.8 kg (215 lb 8 oz)   Height: 182.9 cm (72.01\")   PainSc: 7    PainLoc: Comment: All over, mainly back, knees, ankles and hands         2025     8:41 AM   Current Status   ECOG score 0     Physical Exam  Constitutional: No acute distress. Temperature is 97.8.  Cardiovascular: Heart rate is 46. Blood pressure is 122/73.      Physical Exam  Constitutional:       Appearance: Normal appearance.   HENT:      Head: Normocephalic.   Eyes:      Conjunctiva/sclera: Conjunctivae normal.   Cardiovascular:      Rate and Rhythm: Normal rate.      Heart sounds: Normal heart sounds.   Pulmonary:      Effort: Pulmonary effort is normal.      Breath sounds: Normal breath sounds.   Abdominal:      General: Bowel sounds are normal.      Palpations: Abdomen is soft.   Musculoskeletal:      Right lower leg: No edema.      Left lower leg: No edema.   Skin:     Findings: No rash.   Neurological:      Mental Status: Mental status is at baseline.   Psychiatric:         Thought Content: Thought content normal.       RECENT LABS:  Results from last 7 days   Lab Units 25  0820 "   WBC 10*3/mm3 10.85*   NEUTROS ABS 10*3/mm3 6.31   HEMOGLOBIN g/dL 13.7   HEMATOCRIT % 43.8   PLATELETS 10*3/mm3 244     Results from last 7 days   Lab Units 07/11/25  0820   SODIUM mmol/L 141   POTASSIUM mmol/L 3.5   CHLORIDE mmol/L 109*   CO2 mmol/L 22.4   BUN mg/dL 21.7   CREATININE mg/dL 0.97   CALCIUM mg/dL 8.9   ALBUMIN g/dL 4.0   BILIRUBIN mg/dL 0.4   ALK PHOS U/L 66   ALT (SGPT) U/L 34   AST (SGOT) U/L 28   GLUCOSE mg/dL 85   MAGNESIUM mg/dL 2.2             IMAGING:          Assessment & Plan   Assessment & Plan  1. Metastatic melanoma.  Laboratory studies reported normal CMP, hemoglobin 13.7, platelets 244,000, WBC 10,000, including neutrophils 6300 and monocytes 1100, and eosinophil 510. Magnesium and phosphorus levels are normal. Zometa treatment is due today and will be repeated every 6 weeks. A PET scan will be scheduled prior to the next treatment session in 6 weeks. Keytruda treatment is planned for metastatic melanoma. The goal of the treatment is to manage the progression of the disease. Potential side effects of Keytruda include fatigue, rash, diarrhea, and potential immune-related adverse effects. Supportive care measures include monitoring laboratory values and managing symptoms as they arise.    2. Pain management.  Significant pain is reported, primarily in the back, rated at a 7 out of 10 today, also affecting hands, neck, and knees. Long-acting morphine 15 mg twice daily has been prescribed to provide extended pain relief. Lortab has also been prescribed for additional pain management. Narcan nasal spray has been prescribed as a precautionary measure against potential overdose.    3. Shortness of breath.  Increased shortness of breath is reported. No significant coughing or bleeding issues were noted. Symptoms should be monitored and any worsening should be reported.    4. Medication management.  Currently taking Eliquis with no issues of bleeding or bruising. Advised to continue the current  medication regimen and be cautious to avoid injuries.    1. Metastatic melanoma in the liver, bilateral lungs and bones.  Patient has a history of left eye melanoma 12 years ago.  Patient had a cough and x-ray subsequently chest CT scan 7/15/2024 showed left lower lobe pulmonary nodule 1.7 cm, and multiple other smaller pulmonary nodules.  PET scan on 7/31/2024 reported multiple hypermetabolic lesions. The patient also has multiple metastatic hypermetabolic bone lesions including the T6 vertebral body and also the bilateral iliac wings and also the left sacrum close to the SI joint.   Patient had a liver biopsy confirmed to be metastatic melanoma.  I discussed with the patient the on 7/25/2024 that I recommended to test his tumor sample by [employer] NGS study to see if the patient will have BRAF mutation so he would be a candidate for oral TKI treatment. Then, we also would look into whether this patient would be a candidate for other targeted therapy as well as immunotherapy.   Arrangements will be made for brain MRI with and without contrast.  MRI on 8/5/2024 reported no evidence for intracranial metastatic disease.  [employer] N[employer] study reported stable MSI and TMB at 2.1 m/MB, negative mutation for BRAF, NRAS and KIT.  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   Brain MRI examination on 8/5/2024 reported no evidence for intracranial metastatic disease.  Discussed with the patient on 8/14/2024, he is not a candidate for targeted therapy using oral TKI due to lack of mutation from BRAF NRAS and KIT.  I discussed with the patient for immunotherapy.  We discussed the single agent pembrolizumab versus doublets using nivolumab plus ipilimumab.  Due to the side effects profile, I recommended using a single agent pembrolizumab every 3 weeks due to its better tolerance.  Patient is agreeable.   Had first cycle immunotherapy pembrolizumab 8/27/2024.  Patient presented for  evaluation on 9/17/2024, he reports experiencing some skin pruritus without any visible rashes. He also reports improved energy and appetite from the low-dose prednisone 10 mg daily. He denies any nausea, vomiting, or diarrhea. He has been using his inhaler occasionally for mild shortness of breath, which is not a new symptom. Continue current immunotherapy with pembrolizumab.   10/8/2024: Proceed with Cycle #3 Keytruda. Tolerating well overall.   10/29/2024: Proceed with Cycle #4 Keytruda. Tolerating well.    After 4 cycles of pembrolizumab, the PET scan revealed a mixed response, predominantly showing significant reduction in metastatic liver lesions and resolution of two small liver metastases. Notable improvement was also observed in some bone areas. However, two new small lesions were detected on the right chest wall. The images were reviewed with the patient and his wife, and it was recommended to continue pembrolizumab for another 4 cycles, followed by PET scan again. If the two areas on the right chest wall continue to grow while other areas respond, local radiation therapy will be considered.   1/24/2025 patient returns for cycle 8 pembrolizumab which is continued every 3 weeks.  He will be due for scans following this cycle and then follow-up with Dr. Ellis.   The PET scan on 2/7/2024 showed mixed response to Keytruda treatment, with some areas showing improvement and others worsening. The disease is currently in a small tumor burden, but there are signs of progression in multiple areas.  I discussed with the patient, recommended to continue immunotherapy with Keytruda.  However at the meantime we will refer patient to Dr. Wallace at the [facility] for potential clinical trials.    Patient had cycle 9 pembrolizumab on 2/14/2024.   He was seen by Dr. Wallace at the [facility] on 03/05/2025, who recommended continuing pembrolizumab and repeating a scan in 3 months  for evaluation.   3/7/2025 he is due for cycle 10 pembrolizumab today.  A CT scan will be scheduled in 3 months for further evaluation. If the disease remains stable, he will continue pembrolizumab; otherwise, he will consult Dr. Almonte again if the disease progresses.  4/18/2025: Proceed today with Cycle 11 keytruda. Continues to tolerate treatment well, he does not intermittent arthralgias and muscle weakness which thankfully is starting to improve today. Encouraged him to reach out if this worsens.    The PET scan on 5/23/2025 shows scattered bilateral pulmonary nodules, with some minimally increasing in size but remaining below PET resolution. There is scattered low focal hypermetabolism within the soft tissue/musculature, bone, right diaphragm, and liver as before. A new focal area of uptake within the right gluteus cass likely represents metastatic disease. The index hepatic lesion has decreased in size and hypermetabolic activity, and the left sacrum lesion also shows decreased hypermetabolic activity. Additional osseous and soft tissue lesions have decreased in activity.  I discussed with the patient today 5/30/2025, he has more areas having more significant response compared to small pulmonary nodules, so I recommended to continue immunotherapy with pembrolizumab.    7/11/2025 patient will proceed ahead with Keytruda.  A PET scan will be scheduled prior to the next treatment session in 6 weeks. Keytruda treatment is planned for metastatic melanoma.     2.  Metastatic cancer to the bone.   PET scan examination showed multiple bone lesions.    Recommended to start the patient on IV Zometa no more than every 4 weeks.  Patient received first dose of Zometa on 7/31/2024.  Had good tolerance.  10/29/2024 Zometa was given and to be given every 6 weeks.   1/24/2025 patient continue Zometa which is continued every 6 weeks and due today  PET scan on 2/7/2025 showed mixed response with the bone mets, significant  improvement at the T6 lytic lesion, however worsening in the left ilium.   3/7/2025 Zometa will be given IV, and repeat every 6 weeks.  4/18/2025: Proceed with Zometa today. Continue every 6 weeks.    5/30/2025 continue Zometa.    7/11/2025 continue IV Zometa every 6 weeks.    3.  Cancer related pain.  PET scan examination showed multiple bone lesions including T6 vertebral body, and the sacrum and the pelvic bone..    The patient reports low back pain and I looked at the PET scan images. I do not think necessarily the small left SI joint area bone lesion causing the problem. He reports that he was seen by neurosurgeon and is scheduled to have a spine MRI examination on 08/09/2024.  I certainly would like to see the results of that to make final assessment, to see whether this patient would benefit from some radiation therapy or he has lower back pain from other course. He previously had multiple lumbar spine surgeries for pinched nerve/discectomy.  MRI for T/L spine examination at the [facility] 8/9/2024 for new reported no evidence for metastatic disease.  There were extensive multilevel spondylosis in the T-spine and the moderate narrowing of the spinal canal C7-T1 and T4-T5.  The L-spine MRI examination reported marrow replacing lesion in the left sacral gale 1.2 cm, and also the lesion in the left posterior iliac bone measuring 1.7 cm.  There was also evidence of postsurgical changes.   11/19/2024 3 He continues to experience low back pain on the left side. He reported taking three doses of meloxicam, which may have contributed to his renal injury. He was advised against further use of meloxicam, and he expressed understanding.  Today Renzo Streeter reports a pain score of 7.  Given his pain assessment as noted, treatment options were discussed and the following options were decided upon as a follow-up plan to address the patient's pain: continuation of current treatment plan for pain.       4. Mild  leukocytosis.  Laboratory studies on 09/17/2024 show mild leukocytosis with WBC of 11,070. ANC 5660, eosinophils 820, lymphocytes 3440, and monocytes 990. Hemoglobin is normal at 14.2, and platelets are 243,000. CMP, liver function panel, electrolytes, and glucose are all normal. No immediate intervention is required; continue to monitor blood counts.  11/19/2024 lab study showed a slight improvement in leukocytosis, which is currently only marginally elevated at 10,850, with mostly elevated monocytes 1360 and eosinophils 620, but normal neutrophils and lymphocytes.  Ongoing leukocytosis noted today, 12/13/2024 with WBC 12.58.   12/30/2024 WBC 13,500, including ANC 8730.  This is probably related to steroids intra-articular injection.  Patient is afebrile.  No signs for infection. with an increase in eosinophils to 690, which could be due to seasonal allergies or a reaction to medications.   1/24/2025 WBC 11.8, ANC 6.9   2/14/2025 WBC 11.17 and ANC 7.58.  3/7/2025 laboratory studies show mild persistence of leukocytosis (WBC 11,320) with normal neutrophils (6330), lymphocytes (3350), and mildly elevated monocytes (1130).   4/18/2025: WBC 10.45.   5/30/2025 WBC 12,000, ANC 7520.  7/11/2025 WBC 10,850, ANC 6310, and monocytes 1100.    6.  Vascular access.  Patient had a portacatheter placed by Dr. Wilhelm 8/23/2024.    7. Acute renal injury.  This has resolved.  Creatinine 0.97, on 7/11/2025    8.  New pulmonary emboli   Hospitalization 12/8/2024 through 12/9/2024 secondary to new atrial fibrillation  CT angiogram noted a very small nonocclusive pulmonary.  Patient placed on Eliquis starter pack  Currently on Eliquis 10 mg twice daily.  He understands after completing 1 week of Eliquis he would reduce to 5 mg twice daily.  We reviewed signs and symptoms of bleeding for which to monitor for.   On 1/24/2025 patient continues Eliquis and denies bleeding or bruising.  On 3/7/2025 patient reports tolerating Eliquis  anticoagulation.  4/18/2025: Continues on Eliquis 5mg twice a day. No bleeding concerns.     9. Depression.  On 12/30/2024 he reports feeling depressed occasionally, especially when he stays in bed for extended periods. He is not currently on any medication for depression. He is encouraged to engage in physical activity and exercise to help alleviate symptoms.   2/14/2025 patient reports no significant depression.   Patient is doing well.      10.  Atrial fibrillation  Hospitalization 12/8/2024 through 12/9/2024 secondary to new atrial fibrillation  Evaluation by cardiology with patient to begin metoprolol  Patient is on Eliquis anticoagulation.  He is currently on metoprolol and Eliquis for atrial fibrillation. He reports no episodes of atrial fibrillation since starting the medication. He will continue his current medication regimen with the adjusted metoprolol dosage.    11. Bradycardia.  On 2/14/2025 his heart rate is recorded at 46 bpm today, which is below the normal range. He reports that his heart rate has been intermittently in the 40s for years, particularly at night. He is not experiencing any lightheadedness or dizziness. He is advised to reduce his metoprolol dosage to half a pill, taken twice daily, due to its short-acting nature.  3/7/2025 heart rate 56.   5/30/2025 heart rate is 46 today, which is lower than the usual in the mid 50s. Currently taking metoprolol every 12 hours for rate control due to atrial fibrillation.  We advised to reduce the dosage of metoprolol by half. If heart rate increases, dosage can be adjusted by taking a whole tablet in the morning and half a tablet in the evening. A pill splitter should be used to ensure accurate dosing.    12. Dizziness.  5/30/2025 reports experiencing dizziness, which may be related to blood pressure medication and low heart rate. Blood pressure today is 122/73, which is fair. Reducing the metoprolol dosage may help alleviate the dizziness.    13.  Anemia.   3/7/2025 his hemoglobin is slightly trending down (13.1). Iron, B12, and folate levels will be checked to identify any deficiencies that might be contributing to his anemia and fatigue.  4/18/2025: Hgb 13.7. He continues on vitamin B12 and oral iron.   7/11/2025 hemoglobin 13.7 hematocrit 92.4 and platelets 244,000.    14. Psoriasis.  He reports that his psoriasis has worsened, likely due to immunotherapy. He has been using Selsun Blue without significant improvement. He will discuss alternative treatments with his rheumatologist in a couple of weeks.      15. Medication management.  5/30/2025 patient reports that he has been taking ivermectin and asked my opinion about that.  I advised to discontinue its use as it is not recommended for treating cancer, due to lack of data.      PLAN:   Proceed with Cycle 13 palliative Keytruda today. Continue Keytruda every 6 weeks.   Proceed with Zometa today. Continue every 6 weeks.   Continue oral iron 325mg twice a day   Continue vitamin b12 at 1000 mcg daily   Continue norco every 4 hours as needed.   Continue Eliquis 5mg twice a day   Continue hypertension management per PCP   Patient will see APRN in 6 weeks for reevaluation, prior to his next cycle Keytruda.  Obtain whole-body PET scan in 10 to 11 weeks for reassessment of metastatic melanoma.   See me in 12 weeks with laboratory studies for same Keytruda and Zometa.   Instructed to reach out sooner with any concerns.     The goal of the treatment is to manage the progression of the disease. Potential side effects of Keytruda include fatigue, rash, diarrhea, and potential immune-related adverse effects. Supportive care measures include monitoring laboratory values and managing symptoms as they arise.    I discussed with the patient about laboratory results and further management plan.  Patient voiced understanding and agreeable.    This patient is on high risk medication and needs close monitoring.      Jasmine  MD Caleb PhD  07/11/2025

## 2025-07-23 ENCOUNTER — OFFICE VISIT (OUTPATIENT)
Age: 65
End: 2025-07-23
Payer: MEDICARE

## 2025-07-23 VITALS
SYSTOLIC BLOOD PRESSURE: 150 MMHG | DIASTOLIC BLOOD PRESSURE: 70 MMHG | WEIGHT: 218 LBS | HEIGHT: 72 IN | OXYGEN SATURATION: 95 % | HEART RATE: 53 BPM | BODY MASS INDEX: 29.53 KG/M2

## 2025-07-23 DIAGNOSIS — I26.93 SINGLE SUBSEGMENTAL PULMONARY EMBOLISM WITHOUT ACUTE COR PULMONALE: ICD-10-CM

## 2025-07-23 DIAGNOSIS — I48.0 PAF (PAROXYSMAL ATRIAL FIBRILLATION): Primary | ICD-10-CM

## 2025-07-23 PROCEDURE — 1160F RVW MEDS BY RX/DR IN RCRD: CPT | Performed by: NURSE PRACTITIONER

## 2025-07-23 PROCEDURE — 1159F MED LIST DOCD IN RCRD: CPT | Performed by: NURSE PRACTITIONER

## 2025-07-23 PROCEDURE — 99214 OFFICE O/P EST MOD 30 MIN: CPT | Performed by: NURSE PRACTITIONER

## 2025-07-23 PROCEDURE — 3077F SYST BP >= 140 MM HG: CPT | Performed by: NURSE PRACTITIONER

## 2025-07-23 PROCEDURE — 93000 ELECTROCARDIOGRAM COMPLETE: CPT | Performed by: NURSE PRACTITIONER

## 2025-07-23 PROCEDURE — 3078F DIAST BP <80 MM HG: CPT | Performed by: NURSE PRACTITIONER

## 2025-07-23 RX ORDER — NITROGLYCERIN 0.3 MG/1
TABLET SUBLINGUAL
Qty: 100 TABLET | Refills: 11 | Status: SHIPPED | OUTPATIENT
Start: 2025-07-23

## 2025-07-23 NOTE — PROGRESS NOTES
Date of Office Visit: 2025  Encounter Provider: ATILIO Rodgers  Place of Service: [facility] CARDIOLOGY  Patient Name: Renzo Streeter  :1960    Chief complaint: A-fib    HPI: Renzo Streeter is a 64 y.o. male who is a patient of Dr. Lopez and is new to me today has a history of metastatic melanoma with a recent diagnosis of a small nonocclusive pulmonary embolus of the right lower lobe segmental artery in early December.  He had presented for tachycardia.  He had not was found to be in atrial fibrillation.  He had an echocardiogram at that time showed an EF of 53% normal RV size and function he was put on Eliquis.  He also has a history of hypertension.  He has been in a regular rhythm on beta-blockers.    He comes in today for follow-up.  He denies any chest pain pressure or tightness.  He will have some intermittent sharp discomfort in the chest but nothing with exertion.  His blood pressure had been doing well over the last couple of weeks he has noticed it has been elevated a few times.  He was taking amlodipine but after he saw his blood pressure was well-controlled he has not been taking it every day.  2 weeks ago his blood pressure was 175 and today it is 150/70.    Previous testing and notes have been reviewed by me.      stress testLeft ventricular ejection fraction is normal (Calculated EF = 69%).  Myocardial perfusion imaging indicates a normal myocardial perfusion study with no evidence of ischemia.  Impressions are consistent with a low risk study.    2024 echocardiogram    Left ventricular systolic function is normal. Calculated left ventricular EF = 52.9% Normal left ventricular cavity size noted. Left ventricular wall thickness is consistent with moderate concentric hypertrophy. All left ventricular wall segments contract normally. Left ventricular diastolic function was indeterminate.    Normal right ventricular cavity size,  "systolic function and septal motion noted. Right ventricular wall thickness is consistent with mild hypertrophy.    The left atrial cavity is mildly dilated.    Trace mitral valve regurgitation is present.    Mild tricuspid valve regurgitation is present. Estimated right ventricular systolic pressure from tricuspid regurgitation is normal (<35 mmHg). Calculated right ventricular systolic pressure from tricuspid regurgitation is 28 mmHg.     Past Medical History:   Diagnosis Date    Acute renal failure (ARF) 09/2018    RESOLVED AFTER DEHYDRATION RESOLVED    Anemia     Arthritis     OA AND RA    Asthma     Atrial fibrillation with rapid ventricular response     Bone cancer     BPH (benign prostatic hyperplasia)     Chronic back pain     CKD (chronic kidney disease)     Stage 3    COPD (chronic obstructive pulmonary disease)     Coronary artery calcification seen on CAT scan     Eye cancer, left     TREATMENT WITH RADIATION. SOME VISON LOSS LEFT EYE    Gout     Hepatitis C     IN REMISSION. BEEN LONGER THAN 5 YRS    Hiatal hernia     History of atrial fibrillation     SEPT 2018. WITH SEVERE DEHYDRATION    History of blood transfusion 1978    in [place], no known reaction mild shortness of breath    Hypertension     Liver cancer     Lung cancer     MRSA infection     HX OF    Pneumonia     Psoriasis     Right hip pain     Short of breath on exertion     Sleep apnea     \"MILD\". DID NOT FOLLOW THUR GETTING MACHINE RELATED TO COST OF MACHINE    Syncope 09/2018    secondary to atrial fibrillation with rapid ventricular response. SEVERELY DEHYDRATED AT THIS TIME.    Viral hepatitis     Vision loss of left eye     EYE CANCER    Vitamin D deficiency        Past Surgical History:   Procedure Laterality Date    BACK SURGERY      LUMBAR DISC X2. NO HARDWARE    CARPAL TUNNEL RELEASE WITH CUBITAL TUNNEL RELEASE Right     COLONOSCOPY      COLONOSCOPY N/A 07/30/2021    Procedure: COLONOSCOPY;  Surgeon: Flor Maciel MD;  " Location: [facility] MAIN OR;  Service: Gastroenterology;  Laterality: N/A;  Diverticulosis    EYE SURGERY Left     Left Eye    HERNIA REPAIR  2016    LUMBAR SPINE SURGERY      x3 in 2000. 2010, 2011    REVISION TOTAL KNEE ARTHROPLASTY Right     TOTAL HIP ARTHROPLASTY Right 05/28/2019    Procedure: RIGHT TOTAL HIP ARTHROPLASTY;  Surgeon: Harish Dominguez MD;  Location: [facility] MAIN OR;  Service: Orthopedics    TOTAL KNEE ARTHROPLASTY Right     TOTAL KNEE ARTHROPLASTY Left 04/25/2024    Procedure: TOTAL KNEE ARTHROPLASTY;  Surgeon: Harish Dominguez MD;  Location: [facility] OR OSC;  Service: Orthopedics;  Laterality: Left;    VENOUS ACCESS DEVICE (PORT) INSERTION Right 8/23/2024    Procedure: INSERTION VENOUS ACCESS DEVICE;  Surgeon: Sal Wilhelm MD;  Location: [facility] MAIN OR;  Service: General;  Laterality: Right;       Social History     Socioeconomic History    Marital status: Single   Tobacco Use    Smoking status: Never     Passive exposure: Never    Smokeless tobacco: Never   Vaping Use    Vaping status: Never Used   Substance and Sexual Activity    Alcohol use: No     Comment: no caffeine     Drug use: No    Sexual activity: Defer       Family History   Problem Relation Age of Onset    Depression Mother     COPD Mother     Mental illness Mother     Diabetes Father     Hypertension Father     Heart disease Father     Hyperlipidemia Father     Cancer Father     Liver cancer Father     Drug abuse Neg Hx     Allergies Neg Hx     Asthma Neg Hx     Stroke Neg Hx     Malig Hyperthermia Neg Hx        Review of Systems   Constitutional: Negative for diaphoresis and malaise/fatigue.   Cardiovascular:  Positive for chest pain (intermittent sharp). Negative for claudication, dyspnea on exertion, irregular heartbeat, leg swelling, near-syncope, orthopnea, palpitations, paroxysmal nocturnal dyspnea and syncope.   Respiratory:  Negative for cough, shortness of breath and sleep disturbances due to breathing.     Musculoskeletal:  Negative for falls.   Neurological:  Negative for dizziness and weakness.   Psychiatric/Behavioral:  Negative for altered mental status and substance abuse.        No Known Allergies      Current Outpatient Medications:     acetaminophen (TYLENOL) 325 MG tablet, Take 2 tablets by mouth Every 6 (Six) Hours As Needed for Mild Pain or Headache., Disp: , Rfl:     albuterol (PROVENTIL HFA;VENTOLIN HFA) 108 (90 BASE) MCG/ACT inhaler, Inhale 2 puffs every 4 (four) hours as needed for wheezing., Disp: 1 inhaler, Rfl: 3    albuterol (PROVENTIL) (2.5 MG/3ML) 0.083% nebulizer solution, Inhale 2.5 mg., Disp: , Rfl:     allopurinol (ZYLOPRIM) 100 MG tablet, Take 1 tablet by mouth 2 (Two) Times a Day., Disp: , Rfl:     amLODIPine (NORVASC) 10 MG tablet, Take 1 tablet by mouth Daily., Disp: , Rfl: 6    cholecalciferol (VITAMIN D3) 25 MCG (1000 UT) tablet, Take 1 tablet by mouth Daily., Disp: , Rfl:     Cyanocobalamin (VITAMIN B-12 PO), Take  by mouth., Disp: , Rfl:     diazePAM (VALIUM) 5 MG tablet, , Disp: , Rfl:     Eliquis 5 MG tablet tablet, TAKE 1 TABLET BY MOUTH TWICE DAILY, Disp: 60 tablet, Rfl: 3    ferrous sulfate 325 (65 FE) MG tablet, Take 1 tablet by mouth 2 (Two) Times a Day With Meals., Disp: 60 tablet, Rfl: 3    gabapentin (NEURONTIN) 100 MG capsule, Take 1 capsule by mouth 3 (Three) Times a Day., Disp: , Rfl:     HYDROcodone-acetaminophen (NORCO) 7.5-325 MG per tablet, Take 1 tablet by mouth Every 6 (Six) Hours As Needed for Moderate Pain., Disp: 120 tablet, Rfl: 0    hydrOXYzine (ATARAX) 25 MG tablet, Take 1 tablet by mouth 3 (Three) Times a Day As Needed for Itching., Disp: 90 tablet, Rfl: 2    ivermectin (STROMECTOL) 3 MG tablet tablet, Take 4 tablets by mouth 2 (Two) Times a Day., Disp: , Rfl:     lidocaine-prilocaine (EMLA) 2.5-2.5 % cream, Apply nickel-sized amount over Mediport site 30 min prior to access. Do not rub in., Disp: 30 g, Rfl: 2    methocarbamol (ROBAXIN) 750 MG tablet, Take 1  "tablet by mouth 3 (Three) Times a Day., Disp: , Rfl:     metoprolol tartrate (LOPRESSOR) 25 MG tablet, Take 1 tablet by mouth Every 12 (Twelve) Hours., Disp: 180 tablet, Rfl: 3    Morphine (MS CONTIN) 15 MG 12 hr tablet, Take 1 tablet by mouth 2 (Two) Times a Day., Disp: 60 tablet, Rfl: 0    naloxone (NARCAN) 4 MG/0.1ML nasal spray, Call 911. Don't prime. [place] in 1 nostril for overdose. Repeat in 2-3 minutes in other nostril if no or minimal breathing/responsiveness., Disp: 2 each, Rfl: 0    Omega-3 Fatty Acids (fish oil) 1000 MG capsule capsule, Take 1 capsule by mouth Daily With Breakfast. HOLD PER MD TRIPLETT, Disp: , Rfl:     ondansetron (ZOFRAN) 8 MG tablet, Take 1 tablet by mouth., Disp: , Rfl:     Pembrolizumab (Keytruda) 100 MG/4ML solution, Infuse 8 mL into a venous catheter., Disp: , Rfl:     predniSONE (DELTASONE) 10 MG tablet, TAKE 1 TABLET DAILY, Disp: 90 tablet, Rfl: 3    tamsulosin (FLOMAX) 0.4 MG capsule 24 hr capsule, Take 1 capsule by mouth 2 (Two) Times a Day., Disp: , Rfl:     Trelegy Ellipta 200-62.5-25 MCG/ACT inhaler, Inhale 1 puff Daily., Disp: , Rfl:     Vitamin A 7.5 MG (61716 UT) capsule, 1 Cap, Oral, Cap, Daily, # 30 Cap, 0 Refill(s), Pharmacy: [id number], 182.88 cm, 10/14/24 18:58:00 EDT, CLINICALHEIGHT, 96.36, kg, 10/14/24 18:58:00 EDT, CLINICALWEIGHT, Disp: , Rfl:     vitamin D (ERGOCALCIFEROL) 1.25 MG (67222 UT) capsule capsule, Take 1 capsule by mouth 1 (One) Time Per Week., Disp: , Rfl:         Objective:     Vitals:    07/23/25 1312   BP: 150/70   Pulse: 53   SpO2: 95%   Weight: 98.9 kg (218 lb)   Height: 182.9 cm (72.01\")     Body mass index is 29.56 kg/m².    PHYSICAL EXAM:    Constitutional:       General: Not in acute distress.     Appearance: Normal appearance. Well-developed.   Eyes:      Pupils: Pupils are equal, round, and reactive to light.   HENT:      Head: Normocephalic.   Neck:      Vascular: No carotid bruit or JVD.   Pulmonary:      Effort: Pulmonary " effort is normal. No tachypnea.      Breath sounds: Normal breath sounds. No wheezing. No rales.   Cardiovascular:      Normal rate. Regular rhythm.      No gallop.    Pulses:     Intact distal pulses.   Edema:     Peripheral edema absent.   Abdominal:      General: Bowel sounds are normal.      Palpations: Abdomen is soft.      Tenderness: There is no abdominal tenderness.   Musculoskeletal: Normal range of motion.      Cervical back: Normal range of motion and neck supple. No edema. Skin:     General: Skin is warm and dry.   Neurological:      Mental Status: Alert and oriented to person, place, and time.           ECG 12 Lead    Date/Time: 7/23/2025 1:28 PM  Performed by: Vernell Mendiola APRN    Authorized by: Vernell Mendiola APRN  Comparison: compared with previous ECG from 12/8/2024  Similar to previous ECG  Rhythm: sinus rhythm  Rate: normal  Q waves: V1 and V2    QRS axis: normal    Clinical impression: non-specific ECG      Lipid Panel          8/22/2024    10:18 12/10/2024    12:24   Lipid Panel   Total Cholesterol 136.0     135       Triglycerides  57       HDL Cholesterol 34     38       LDL Cholesterol  93.0     84          Details          This result is from an external source.                 Assessment/Plan:      1.  Atrial fibrillation-maintaining sinus rhythm continue Eliquis 5 mg twice a day    2.  Atypical sharp chest pain-negative stress test in the past.  EKG stable.  Pain does not seem cardiac related it is intermittent brief sharp pain.    3.  Essential hypertension discussed with patient that he should take amlodipine regularly.  Will start with 5 mg daily.    4.  Dyslipidemia lipids at goal LDL less than 100    5.  Metastatic melanoma    Follow-up in 6 months sooner if need         Your medication list            Accurate as of July 23, 2025  1:26 PM. If you have any questions, ask your nurse or doctor.                CONTINUE taking these medications        Instructions Last Dose  Given Next Dose Due   acetaminophen 325 MG tablet  Commonly known as: TYLENOL      Take 2 tablets by mouth Every 6 (Six) Hours As Needed for Mild Pain or Headache.       albuterol sulfate  (90 Base) MCG/ACT inhaler  Commonly known as: PROVENTIL HFA;VENTOLIN HFA;PROAIR HFA      Inhale 2 puffs every 4 (four) hours as needed for wheezing.       albuterol (2.5 MG/3ML) 0.083% nebulizer solution  Commonly known as: PROVENTIL      Inhale 2.5 mg.       allopurinol 100 MG tablet  Commonly known as: ZYLOPRIM      Take 1 tablet by mouth 2 (Two) Times a Day.       amLODIPine 10 MG tablet  Commonly known as: NORVASC      Take 1 tablet by mouth Daily.       cholecalciferol 25 MCG (1000 UT) tablet  Commonly known as: VITAMIN D3      Take 1 tablet by mouth Daily.       diazePAM 5 MG tablet  Commonly known as: VALIUM           Eliquis 5 MG tablet tablet  Generic drug: apixaban      TAKE 1 TABLET BY MOUTH TWICE DAILY       ferrous sulfate 325 (65 FE) MG tablet      Take 1 tablet by mouth 2 (Two) Times a Day With Meals.       fish oil 1000 MG capsule capsule      Take 1 capsule by mouth Daily With Breakfast. HOLD PER MD INSTR       gabapentin 100 MG capsule  Commonly known as: NEURONTIN      Take 1 capsule by mouth 3 (Three) Times a Day.       HYDROcodone-acetaminophen 7.5-325 MG per tablet  Commonly known as: NORCO      Take 1 tablet by mouth Every 6 (Six) Hours As Needed for Moderate Pain.       hydrOXYzine 25 MG tablet  Commonly known as: ATARAX      Take 1 tablet by mouth 3 (Three) Times a Day As Needed for Itching.       ivermectin 3 MG tablet tablet  Commonly known as: STROMECTOL      Take 4 tablets by mouth 2 (Two) Times a Day.       Keytruda 100 MG/4ML solution  Generic drug: Pembrolizumab      Infuse 8 mL into a venous catheter.       lidocaine-prilocaine 2.5-2.5 % cream  Commonly known as: EMLA      Apply nickel-sized amount over Mediport site 30 min prior to access. Do not rub in.       methocarbamol 750 MG  tablet  Commonly known as: ROBAXIN      Take 1 tablet by mouth 3 (Three) Times a Day.       metoprolol tartrate 25 MG tablet  Commonly known as: LOPRESSOR      Take 1 tablet by mouth Every 12 (Twelve) Hours.       Morphine 15 MG 12 hr tablet  Commonly known as: MS CONTIN      Take 1 tablet by mouth 2 (Two) Times a Day.       naloxone 4 MG/0.1ML nasal spray  Commonly known as: NARCAN      Call 911. Don't prime. [place] in 1 nostril for overdose. Repeat in 2-3 minutes in other nostril if no or minimal breathing/responsiveness.       ondansetron 8 MG tablet  Commonly known as: ZOFRAN      Take 1 tablet by mouth.       predniSONE 10 MG tablet  Commonly known as: DELTASONE      TAKE 1 TABLET DAILY       tamsulosin 0.4 MG capsule 24 hr capsule  Commonly known as: FLOMAX      Take 1 capsule by mouth 2 (Two) Times a Day.       Trelegy Ellipta 200-62.5-25 MCG/ACT inhaler  Generic drug: Fluticasone-Umeclidin-Vilant      Inhale 1 puff Daily.       Vitamin A 7.5 MG (64545 UT) capsule      1 Cap, Oral, Cap, Daily, # 30 Cap, 0 Refill(s), Pharmacy: [facility] #[id number], 182.88 cm, 10/14/24 18:58:00 EDT, CLINICALHEIGHT, 96.36, kg, 10/14/24 18:58:00 EDT, CLINICALWEIGHT       VITAMIN B-12 PO      Take  by mouth.       vitamin D 1.25 MG (27928 UT) capsule capsule  Commonly known as: ERGOCALCIFEROL      Take 1 capsule by mouth 1 (One) Time Per Week.                  As always, it has been a pleasure to participate in your patient's care.      Sincerely,     Vernell TRIMBLE

## 2025-08-01 ENCOUNTER — TELEPHONE (OUTPATIENT)
Dept: ONCOLOGY | Facility: CLINIC | Age: 65
End: 2025-08-01
Payer: MEDICARE

## 2025-08-13 RX ORDER — FERROUS SULFATE 325(65) MG
1 TABLET ORAL 2 TIMES DAILY WITH MEALS
Qty: 60 TABLET | Refills: 3 | Status: SHIPPED | OUTPATIENT
Start: 2025-08-13

## 2025-08-14 ENCOUNTER — TELEPHONE (OUTPATIENT)
Dept: CARDIOLOGY | Age: 65
End: 2025-08-14
Payer: MEDICARE

## 2025-08-22 ENCOUNTER — OFFICE VISIT (OUTPATIENT)
Dept: ONCOLOGY | Facility: CLINIC | Age: 65
End: 2025-08-22
Payer: MEDICARE

## 2025-08-22 ENCOUNTER — INFUSION (OUTPATIENT)
Dept: ONCOLOGY | Facility: HOSPITAL | Age: 65
End: 2025-08-22
Payer: MEDICARE

## 2025-08-22 VITALS
SYSTOLIC BLOOD PRESSURE: 122 MMHG | DIASTOLIC BLOOD PRESSURE: 71 MMHG | WEIGHT: 213.6 LBS | BODY MASS INDEX: 28.93 KG/M2 | TEMPERATURE: 97.4 F | OXYGEN SATURATION: 97 % | HEART RATE: 45 BPM | HEIGHT: 72 IN

## 2025-08-22 DIAGNOSIS — R45.89 ANXIETY ABOUT HEALTH: ICD-10-CM

## 2025-08-22 DIAGNOSIS — G89.4 CHRONIC PAIN SYNDROME: ICD-10-CM

## 2025-08-22 DIAGNOSIS — Z79.899 HIGH RISK MEDICATION USE: ICD-10-CM

## 2025-08-22 DIAGNOSIS — Z79.899 ENCOUNTER FOR LONG-TERM (CURRENT) USE OF HIGH-RISK MEDICATION: ICD-10-CM

## 2025-08-22 DIAGNOSIS — G47.00 INSOMNIA, UNSPECIFIED TYPE: ICD-10-CM

## 2025-08-22 DIAGNOSIS — C78.7 MELANOMA OF UVEA METASTATIC TO LIVER: ICD-10-CM

## 2025-08-22 DIAGNOSIS — C79.51 SECONDARY CANCER OF BONE: ICD-10-CM

## 2025-08-22 DIAGNOSIS — C78.7 MELANOMA OF UVEA METASTATIC TO LIVER: Primary | ICD-10-CM

## 2025-08-22 DIAGNOSIS — C69.32 MALIGNANT NEOPLASM OF LEFT CHOROID: ICD-10-CM

## 2025-08-22 DIAGNOSIS — C69.92 MALIGNANT NEOPLASM OF LEFT EYE: ICD-10-CM

## 2025-08-22 DIAGNOSIS — C43.9 METASTATIC MELANOMA: Primary | ICD-10-CM

## 2025-08-22 DIAGNOSIS — G89.3 CANCER RELATED PAIN: ICD-10-CM

## 2025-08-22 DIAGNOSIS — C78.7 CANCER, METASTATIC TO LIVER: ICD-10-CM

## 2025-08-22 DIAGNOSIS — C69.40 MELANOMA OF UVEA METASTATIC TO LIVER: ICD-10-CM

## 2025-08-22 DIAGNOSIS — C69.40 MELANOMA OF UVEA METASTATIC TO LIVER: Primary | ICD-10-CM

## 2025-08-22 LAB
ALBUMIN SERPL-MCNC: 4 G/DL (ref 3.5–5.2)
ALBUMIN/GLOB SERPL: 1.8 G/DL
ALP SERPL-CCNC: 63 U/L (ref 39–117)
ALT SERPL W P-5'-P-CCNC: 37 U/L (ref 1–41)
ANION GAP SERPL CALCULATED.3IONS-SCNC: 11 MMOL/L (ref 5–15)
AST SERPL-CCNC: 35 U/L (ref 1–40)
BASOPHILS # BLD AUTO: 0.1 10*3/MM3 (ref 0–0.2)
BASOPHILS NFR BLD AUTO: 0.8 % (ref 0–1.5)
BILIRUB SERPL-MCNC: 0.3 MG/DL (ref 0–1.2)
BUN SERPL-MCNC: 34 MG/DL (ref 8–23)
BUN/CREAT SERPL: 31.8 (ref 7–25)
CALCIUM SPEC-SCNC: 8.9 MG/DL (ref 8.6–10.5)
CHLORIDE SERPL-SCNC: 107 MMOL/L (ref 98–107)
CO2 SERPL-SCNC: 21 MMOL/L (ref 22–29)
CREAT SERPL-MCNC: 1.07 MG/DL (ref 0.76–1.27)
DEPRECATED RDW RBC AUTO: 51.7 FL (ref 37–54)
EGFRCR SERPLBLD CKD-EPI 2021: 77 ML/MIN/1.73
EOSINOPHIL # BLD AUTO: 0.48 10*3/MM3 (ref 0–0.4)
EOSINOPHIL NFR BLD AUTO: 4 % (ref 0.3–6.2)
ERYTHROCYTE [DISTWIDTH] IN BLOOD BY AUTOMATED COUNT: 15.5 % (ref 12.3–15.4)
GLOBULIN UR ELPH-MCNC: 2.2 GM/DL
GLUCOSE SERPL-MCNC: 150 MG/DL (ref 65–99)
HCT VFR BLD AUTO: 41.1 % (ref 37.5–51)
HGB BLD-MCNC: 13.5 G/DL (ref 13–17.7)
IMM GRANULOCYTES # BLD AUTO: 0.05 10*3/MM3 (ref 0–0.05)
IMM GRANULOCYTES NFR BLD AUTO: 0.4 % (ref 0–0.5)
LYMPHOCYTES # BLD AUTO: 2.82 10*3/MM3 (ref 0.7–3.1)
LYMPHOCYTES NFR BLD AUTO: 23.5 % (ref 19.6–45.3)
MAGNESIUM SERPL-MCNC: 2.1 MG/DL (ref 1.6–2.4)
MCH RBC QN AUTO: 29.9 PG (ref 26.6–33)
MCHC RBC AUTO-ENTMCNC: 32.8 G/DL (ref 31.5–35.7)
MCV RBC AUTO: 91.1 FL (ref 79–97)
MONOCYTES # BLD AUTO: 1.19 10*3/MM3 (ref 0.1–0.9)
MONOCYTES NFR BLD AUTO: 9.9 % (ref 5–12)
NEUTROPHILS NFR BLD AUTO: 61.4 % (ref 42.7–76)
NEUTROPHILS NFR BLD AUTO: 7.36 10*3/MM3 (ref 1.7–7)
NRBC BLD AUTO-RTO: 0 /100 WBC (ref 0–0.2)
PHOSPHATE SERPL-MCNC: 4.3 MG/DL (ref 2.5–4.5)
PLATELET # BLD AUTO: 228 10*3/MM3 (ref 140–450)
PMV BLD AUTO: 9.5 FL (ref 6–12)
POTASSIUM SERPL-SCNC: 4 MMOL/L (ref 3.5–5.2)
PROT SERPL-MCNC: 6.2 G/DL (ref 6–8.5)
RBC # BLD AUTO: 4.51 10*6/MM3 (ref 4.14–5.8)
SODIUM SERPL-SCNC: 139 MMOL/L (ref 136–145)
WBC NRBC COR # BLD AUTO: 12 10*3/MM3 (ref 3.4–10.8)

## 2025-08-22 PROCEDURE — 83735 ASSAY OF MAGNESIUM: CPT

## 2025-08-22 PROCEDURE — 25010000002 ZOLEDRONIC ACID 4 MG/100ML SOLUTION: Performed by: NURSE PRACTITIONER

## 2025-08-22 PROCEDURE — 80053 COMPREHEN METABOLIC PANEL: CPT

## 2025-08-22 PROCEDURE — 84100 ASSAY OF PHOSPHORUS: CPT

## 2025-08-22 PROCEDURE — 96413 CHEMO IV INFUSION 1 HR: CPT

## 2025-08-22 PROCEDURE — 85025 COMPLETE CBC W/AUTO DIFF WBC: CPT

## 2025-08-22 PROCEDURE — 25010000002 PEMBROLIZUMAB 100 MG/4ML SOLUTION 4 ML VIAL: Performed by: NURSE PRACTITIONER

## 2025-08-22 PROCEDURE — 96375 TX/PRO/DX INJ NEW DRUG ADDON: CPT

## 2025-08-22 RX ORDER — ZOLEDRONIC ACID 0.04 MG/ML
4 INJECTION, SOLUTION INTRAVENOUS ONCE
Status: COMPLETED | OUTPATIENT
Start: 2025-08-22 | End: 2025-08-22

## 2025-08-22 RX ORDER — SODIUM CHLORIDE 9 MG/ML
20 INJECTION, SOLUTION INTRAVENOUS ONCE
Status: CANCELLED | OUTPATIENT
Start: 2025-08-22

## 2025-08-22 RX ORDER — MORPHINE SULFATE 15 MG/1
15 TABLET, FILM COATED, EXTENDED RELEASE ORAL 2 TIMES DAILY
Qty: 60 TABLET | Refills: 0 | Status: SHIPPED | OUTPATIENT
Start: 2025-08-22

## 2025-08-22 RX ORDER — HYDROCODONE BITARTRATE AND ACETAMINOPHEN 7.5; 325 MG/1; MG/1
1 TABLET ORAL EVERY 6 HOURS PRN
Qty: 120 TABLET | Refills: 0 | Status: SHIPPED | OUTPATIENT
Start: 2025-08-22

## 2025-08-22 RX ORDER — ZOLEDRONIC ACID 0.04 MG/ML
4 INJECTION, SOLUTION INTRAVENOUS ONCE
Status: CANCELLED | OUTPATIENT
Start: 2025-08-22

## 2025-08-22 RX ADMIN — SODIUM CHLORIDE 400 MG: 9 INJECTION, SOLUTION INTRAVENOUS at 14:29

## 2025-08-22 RX ADMIN — ZOLEDRONIC ACID 4 MG: 0.04 INJECTION, SOLUTION INTRAVENOUS at 14:14

## (undated) DEVICE — POSTN CONVOL FM OR TABLE 2X12X20

## (undated) DEVICE — CVR PROB 96IN LF STRL

## (undated) DEVICE — ANTIBACTERIAL UNDYED BRAIDED (POLYGLACTIN 910), SYNTHETIC ABSORBABLE SUTURE: Brand: COATED VICRYL

## (undated) DEVICE — JELLY,LUBE,STERILE,FLIP TOP,TUBE,4-OZ: Brand: MEDLINE

## (undated) DEVICE — APPL CHLORAPREP HI/LITE 26ML ORNG

## (undated) DEVICE — PATIENT RETURN ELECTRODE, SINGLE-USE, CONTACT QUALITY MONITORING, ADULT, WITH 9FT CORD, FOR PATIENTS WEIGING OVER 33LBS. (15KG): Brand: MEGADYNE

## (undated) DEVICE — GLV SURG SENSICARE POLYISPRN W/ALOE PF LF 6.5 GRN STRL

## (undated) DEVICE — DRP C/ARM 41X74IN

## (undated) DEVICE — THE STERILE LIGHT HANDLE COVER IS USED WITH STERIS SURGICAL LIGHTING AND VISUALIZATION SYSTEMS.

## (undated) DEVICE — NDL HYPO PRECISIONGLIDE REG 25G 1 1/2

## (undated) DEVICE — LOU MINOR PROCEDURE: Brand: MEDLINE INDUSTRIES, INC.

## (undated) DEVICE — SUT MNCRYL PLS ANTIB UD 4/0 PS2 18IN

## (undated) DEVICE — SYR LUERLOK 20CC BX/50

## (undated) DEVICE — VIOLET BRAIDED (POLYGLACTIN 910), SYNTHETIC ABSORBABLE SUTURE: Brand: COATED VICRYL

## (undated) DEVICE — TRAP FLD MEGADYNE

## (undated) DEVICE — GOWN,SIRUS,NON REINFRCD,LARGE,SET IN SL: Brand: MEDLINE

## (undated) DEVICE — SUT PDS 2/0 SH 27IN Z317H

## (undated) DEVICE — ADHS SKIN SURG TISS VISC PREMIERPRO EXOFIN HI/VISC FAST/DRY

## (undated) DEVICE — GLV SURG SENSICARE PI MIC PF SZ6.5 LF STRL

## (undated) DEVICE — INTENDED FOR TISSUE SEPARATION, AND OTHER PROCEDURES THAT REQUIRE A SHARP SURGICAL BLADE TO PUNCTURE OR CUT.: Brand: BARD-PARKER ® CARBON RIB-BACK BLADES

## (undated) DEVICE — GLV SURG PREMIERPRO ORTHO LTX PF SZ7 BRN

## (undated) DEVICE — GLV SURG BIOGEL LTX PF 6

## (undated) DEVICE — COVER,C-ARM,41X74: Brand: MEDLINE

## (undated) DEVICE — SYR LL TP 10ML STRL